# Patient Record
Sex: FEMALE | Race: WHITE | NOT HISPANIC OR LATINO | Employment: FULL TIME | URBAN - METROPOLITAN AREA
[De-identification: names, ages, dates, MRNs, and addresses within clinical notes are randomized per-mention and may not be internally consistent; named-entity substitution may affect disease eponyms.]

---

## 2017-06-05 ENCOUNTER — CONVERSION ENCOUNTER (OUTPATIENT)
Dept: MAMMOGRAPHY | Facility: HOSPITAL | Age: 52
End: 2017-06-05

## 2018-08-28 ENCOUNTER — CONVERSION ENCOUNTER (OUTPATIENT)
Dept: GENERAL RADIOLOGY | Facility: HOSPITAL | Age: 53
End: 2018-08-28

## 2019-11-27 ENCOUNTER — HOSPITAL ENCOUNTER (OUTPATIENT)
Dept: GENERAL RADIOLOGY | Facility: HOSPITAL | Age: 54
Discharge: HOME | End: 2019-11-27
Attending: NURSE PRACTITIONER

## 2020-05-13 ENCOUNTER — OFFICE VISIT CONVERTED (OUTPATIENT)
Dept: ORTHOPEDIC SURGERY | Facility: CLINIC | Age: 55
End: 2020-05-13
Attending: ORTHOPAEDIC SURGERY

## 2020-06-24 ENCOUNTER — OFFICE VISIT CONVERTED (OUTPATIENT)
Dept: ORTHOPEDIC SURGERY | Facility: CLINIC | Age: 55
End: 2020-06-24
Attending: ORTHOPAEDIC SURGERY

## 2020-07-14 ENCOUNTER — HOSPITAL ENCOUNTER (OUTPATIENT)
Dept: GENERAL RADIOLOGY | Facility: HOSPITAL | Age: 55
Discharge: HOME | End: 2020-07-14
Attending: ORTHOPAEDIC SURGERY

## 2020-07-20 ENCOUNTER — OFFICE VISIT CONVERTED (OUTPATIENT)
Dept: ORTHOPEDIC SURGERY | Facility: CLINIC | Age: 55
End: 2020-07-20
Attending: ORTHOPAEDIC SURGERY

## 2020-08-03 ENCOUNTER — HOSPITAL ENCOUNTER (OUTPATIENT)
Dept: PREADMISSION TESTING | Facility: HOSPITAL | Age: 55
Discharge: HOME | End: 2020-08-03
Attending: ORTHOPAEDIC SURGERY

## 2020-08-04 LAB — SARS-COV-2 RNA SPEC QL NAA+PROBE: NOT DETECTED

## 2020-08-06 ENCOUNTER — HOSPITAL ENCOUNTER (OUTPATIENT)
Dept: PERIOP | Facility: HOSPITAL | Age: 55
Setting detail: HOSPITAL OUTPATIENT SURGERY
Discharge: HOME | End: 2020-08-06
Attending: ORTHOPAEDIC SURGERY

## 2020-08-19 ENCOUNTER — OFFICE VISIT CONVERTED (OUTPATIENT)
Dept: ORTHOPEDIC SURGERY | Facility: CLINIC | Age: 55
End: 2020-08-19
Attending: PHYSICIAN ASSISTANT

## 2020-08-19 ENCOUNTER — CONVERSION ENCOUNTER (OUTPATIENT)
Dept: ORTHOPEDIC SURGERY | Facility: CLINIC | Age: 55
End: 2020-08-19

## 2020-09-16 ENCOUNTER — OFFICE VISIT CONVERTED (OUTPATIENT)
Dept: ORTHOPEDIC SURGERY | Facility: CLINIC | Age: 55
End: 2020-09-16
Attending: PHYSICIAN ASSISTANT

## 2020-10-14 ENCOUNTER — OFFICE VISIT CONVERTED (OUTPATIENT)
Dept: ORTHOPEDIC SURGERY | Facility: CLINIC | Age: 55
End: 2020-10-14
Attending: PHYSICIAN ASSISTANT

## 2020-10-28 ENCOUNTER — CONVERSION ENCOUNTER (OUTPATIENT)
Dept: ORTHOPEDIC SURGERY | Facility: CLINIC | Age: 55
End: 2020-10-28

## 2020-10-28 ENCOUNTER — OFFICE VISIT CONVERTED (OUTPATIENT)
Dept: ORTHOPEDIC SURGERY | Facility: CLINIC | Age: 55
End: 2020-10-28
Attending: PHYSICIAN ASSISTANT

## 2020-11-18 ENCOUNTER — OFFICE VISIT CONVERTED (OUTPATIENT)
Dept: ORTHOPEDIC SURGERY | Facility: CLINIC | Age: 55
End: 2020-11-18
Attending: PHYSICIAN ASSISTANT

## 2020-12-07 ENCOUNTER — OFFICE VISIT CONVERTED (OUTPATIENT)
Dept: ORTHOPEDIC SURGERY | Facility: CLINIC | Age: 55
End: 2020-12-07
Attending: PHYSICIAN ASSISTANT

## 2020-12-07 ENCOUNTER — CONVERSION ENCOUNTER (OUTPATIENT)
Dept: ORTHOPEDIC SURGERY | Facility: CLINIC | Age: 55
End: 2020-12-07

## 2020-12-17 ENCOUNTER — HOSPITAL ENCOUNTER (OUTPATIENT)
Dept: URGENT CARE | Facility: CLINIC | Age: 55
Discharge: HOME | End: 2020-12-17

## 2020-12-24 ENCOUNTER — HOSPITAL ENCOUNTER (OUTPATIENT)
Dept: GENERAL RADIOLOGY | Facility: HOSPITAL | Age: 55
Discharge: HOME | End: 2020-12-24
Attending: NURSE PRACTITIONER

## 2021-02-17 ENCOUNTER — OFFICE VISIT CONVERTED (OUTPATIENT)
Dept: ORTHOPEDIC SURGERY | Facility: CLINIC | Age: 56
End: 2021-02-17
Attending: PHYSICIAN ASSISTANT

## 2021-02-23 ENCOUNTER — CONVERSION ENCOUNTER (OUTPATIENT)
Dept: FAMILY MEDICINE CLINIC | Facility: CLINIC | Age: 56
End: 2021-02-23

## 2021-02-23 ENCOUNTER — OFFICE VISIT CONVERTED (OUTPATIENT)
Dept: FAMILY MEDICINE CLINIC | Facility: CLINIC | Age: 56
End: 2021-02-23
Attending: NURSE PRACTITIONER

## 2021-02-23 ENCOUNTER — HOSPITAL ENCOUNTER (OUTPATIENT)
Dept: FAMILY MEDICINE CLINIC | Facility: CLINIC | Age: 56
Discharge: HOME | End: 2021-02-23
Attending: NURSE PRACTITIONER

## 2021-02-24 LAB
ALBUMIN SERPL-MCNC: 4.1 G/DL (ref 3.5–5)
ALBUMIN/GLOB SERPL: 1.6 {RATIO} (ref 1.4–2.6)
ALP SERPL-CCNC: 76 U/L (ref 53–141)
ALT SERPL-CCNC: 16 U/L (ref 10–40)
ANION GAP SERPL CALC-SCNC: 17 MMOL/L (ref 8–19)
AST SERPL-CCNC: 21 U/L (ref 15–50)
BASOPHILS # BLD AUTO: 0.06 10*3/UL (ref 0–0.2)
BASOPHILS NFR BLD AUTO: 0.8 % (ref 0–3)
BILIRUB SERPL-MCNC: 0.23 MG/DL (ref 0.2–1.3)
BUN SERPL-MCNC: 13 MG/DL (ref 5–25)
BUN/CREAT SERPL: 19 {RATIO} (ref 6–20)
CALCIUM SERPL-MCNC: 9 MG/DL (ref 8.7–10.4)
CHLORIDE SERPL-SCNC: 107 MMOL/L (ref 99–111)
CHOLEST SERPL-MCNC: 187 MG/DL (ref 107–200)
CHOLEST/HDLC SERPL: 4.7 {RATIO} (ref 3–6)
CONV ABS IMM GRAN: 0.03 10*3/UL (ref 0–0.2)
CONV CO2: 20 MMOL/L (ref 22–32)
CONV IMMATURE GRAN: 0.4 % (ref 0–1.8)
CONV TOTAL PROTEIN: 6.7 G/DL (ref 6.3–8.2)
CREAT UR-MCNC: 0.7 MG/DL (ref 0.5–0.9)
DEPRECATED RDW RBC AUTO: 46.1 FL (ref 36.4–46.3)
EOSINOPHIL # BLD AUTO: 0.55 10*3/UL (ref 0–0.7)
EOSINOPHIL # BLD AUTO: 6.9 % (ref 0–7)
ERYTHROCYTE [DISTWIDTH] IN BLOOD BY AUTOMATED COUNT: 17.7 % (ref 11.7–14.4)
GFR SERPLBLD BASED ON 1.73 SQ M-ARVRAT: >60 ML/MIN/{1.73_M2}
GLOBULIN UR ELPH-MCNC: 2.6 G/DL (ref 2–3.5)
GLUCOSE SERPL-MCNC: 81 MG/DL (ref 65–99)
HCT VFR BLD AUTO: 32.7 % (ref 37–47)
HDLC SERPL-MCNC: 40 MG/DL (ref 40–60)
HGB BLD-MCNC: 9.5 G/DL (ref 12–16)
LDLC SERPL CALC-MCNC: 125 MG/DL (ref 70–100)
LYMPHOCYTES # BLD AUTO: 2.09 10*3/UL (ref 1–5)
LYMPHOCYTES NFR BLD AUTO: 26.3 % (ref 20–45)
MCH RBC QN AUTO: 21.1 PG (ref 27–31)
MCHC RBC AUTO-ENTMCNC: 29.1 G/DL (ref 33–37)
MCV RBC AUTO: 72.5 FL (ref 81–99)
MONOCYTES # BLD AUTO: 0.65 10*3/UL (ref 0.2–1.2)
MONOCYTES NFR BLD AUTO: 8.2 % (ref 3–10)
NEUTROPHILS # BLD AUTO: 4.58 10*3/UL (ref 2–8)
NEUTROPHILS NFR BLD AUTO: 57.4 % (ref 30–85)
NRBC CBCN: 0 % (ref 0–0.7)
OSMOLALITY SERPL CALC.SUM OF ELEC: 289 MOSM/KG (ref 273–304)
PLATELET # BLD AUTO: 385 10*3/UL (ref 130–400)
PMV BLD AUTO: 11.5 FL (ref 9.4–12.3)
POTASSIUM SERPL-SCNC: 4.2 MMOL/L (ref 3.5–5.3)
RBC # BLD AUTO: 4.51 10*6/UL (ref 4.2–5.4)
SODIUM SERPL-SCNC: 140 MMOL/L (ref 135–147)
T4 FREE SERPL-MCNC: 1.9 NG/DL (ref 0.9–1.8)
TRIGL SERPL-MCNC: 111 MG/DL (ref 40–150)
TSH SERPL-ACNC: 0.61 M[IU]/L (ref 0.27–4.2)
VLDLC SERPL-MCNC: 22 MG/DL (ref 5–37)
WBC # BLD AUTO: 7.96 10*3/UL (ref 4.8–10.8)

## 2021-02-26 ENCOUNTER — HOSPITAL ENCOUNTER (OUTPATIENT)
Dept: LAB | Facility: HOSPITAL | Age: 56
Discharge: HOME | End: 2021-02-26
Attending: NURSE PRACTITIONER

## 2021-02-26 LAB
FERRITIN SERPL-MCNC: 7 NG/ML (ref 10–200)
FOLATE SERPL-MCNC: 14.2 NG/ML (ref 4.8–20)
IRON SATN MFR SERPL: 6 % (ref 20–55)
IRON SERPL-MCNC: 25 UG/DL (ref 60–170)
TIBC SERPL-MCNC: 418 UG/DL (ref 245–450)
TRANSFERRIN SERPL-MCNC: 292 MG/DL (ref 250–380)
VIT B12 SERPL-MCNC: 376 PG/ML (ref 211–911)

## 2021-03-15 ENCOUNTER — OFFICE VISIT CONVERTED (OUTPATIENT)
Dept: ORTHOPEDIC SURGERY | Facility: CLINIC | Age: 56
End: 2021-03-15
Attending: ORTHOPAEDIC SURGERY

## 2021-04-13 ENCOUNTER — CONVERSION ENCOUNTER (OUTPATIENT)
Dept: SURGERY | Facility: CLINIC | Age: 56
End: 2021-04-13

## 2021-04-13 ENCOUNTER — OFFICE VISIT CONVERTED (OUTPATIENT)
Dept: SURGERY | Facility: CLINIC | Age: 56
End: 2021-04-13
Attending: SURGERY

## 2021-04-21 ENCOUNTER — HOSPITAL ENCOUNTER (OUTPATIENT)
Dept: LAB | Facility: HOSPITAL | Age: 56
Discharge: HOME | End: 2021-04-21
Attending: NURSE PRACTITIONER

## 2021-04-21 LAB
ALBUMIN SERPL-MCNC: 3.9 G/DL (ref 3.5–5)
ALBUMIN/GLOB SERPL: 1.3 {RATIO} (ref 1.4–2.6)
ALP SERPL-CCNC: 79 U/L (ref 53–141)
ALT SERPL-CCNC: 20 U/L (ref 10–40)
ANION GAP SERPL CALC-SCNC: 16 MMOL/L (ref 8–19)
AST SERPL-CCNC: 22 U/L (ref 15–50)
BASOPHILS # BLD AUTO: 0.05 10*3/UL (ref 0–0.2)
BASOPHILS NFR BLD AUTO: 0.7 % (ref 0–3)
BILIRUB SERPL-MCNC: 0.21 MG/DL (ref 0.2–1.3)
BUN SERPL-MCNC: 22 MG/DL (ref 5–25)
BUN/CREAT SERPL: 29 {RATIO} (ref 6–20)
CALCIUM SERPL-MCNC: 9.1 MG/DL (ref 8.7–10.4)
CHLORIDE SERPL-SCNC: 106 MMOL/L (ref 99–111)
CHOLEST SERPL-MCNC: 193 MG/DL (ref 107–200)
CHOLEST/HDLC SERPL: 4.7 {RATIO} (ref 3–6)
CONV ABS IMM GRAN: 0.03 10*3/UL (ref 0–0.2)
CONV CO2: 21 MMOL/L (ref 22–32)
CONV IMMATURE GRAN: 0.4 % (ref 0–1.8)
CONV TOTAL PROTEIN: 6.8 G/DL (ref 6.3–8.2)
CREAT UR-MCNC: 0.77 MG/DL (ref 0.5–0.9)
DEPRECATED RDW RBC AUTO: 63 FL (ref 36.4–46.3)
EOSINOPHIL # BLD AUTO: 0.5 10*3/UL (ref 0–0.7)
EOSINOPHIL # BLD AUTO: 6.9 % (ref 0–7)
ERYTHROCYTE [DISTWIDTH] IN BLOOD BY AUTOMATED COUNT: 21.9 % (ref 11.7–14.4)
GFR SERPLBLD BASED ON 1.73 SQ M-ARVRAT: >60 ML/MIN/{1.73_M2}
GLOBULIN UR ELPH-MCNC: 2.9 G/DL (ref 2–3.5)
GLUCOSE SERPL-MCNC: 96 MG/DL (ref 65–99)
HCT VFR BLD AUTO: 42.1 % (ref 37–47)
HDLC SERPL-MCNC: 41 MG/DL (ref 40–60)
HGB BLD-MCNC: 12.9 G/DL (ref 12–16)
IRON SATN MFR SERPL: 22 % (ref 20–55)
IRON SERPL-MCNC: 77 UG/DL (ref 60–170)
LDLC SERPL CALC-MCNC: 124 MG/DL (ref 70–100)
LYMPHOCYTES # BLD AUTO: 1.57 10*3/UL (ref 1–5)
LYMPHOCYTES NFR BLD AUTO: 21.6 % (ref 20–45)
MCH RBC QN AUTO: 25.4 PG (ref 27–31)
MCHC RBC AUTO-ENTMCNC: 30.6 G/DL (ref 33–37)
MCV RBC AUTO: 82.9 FL (ref 81–99)
MONOCYTES # BLD AUTO: 0.65 10*3/UL (ref 0.2–1.2)
MONOCYTES NFR BLD AUTO: 8.9 % (ref 3–10)
NEUTROPHILS # BLD AUTO: 4.48 10*3/UL (ref 2–8)
NEUTROPHILS NFR BLD AUTO: 61.5 % (ref 30–85)
NRBC CBCN: 0 % (ref 0–0.7)
OSMOLALITY SERPL CALC.SUM OF ELEC: 291 MOSM/KG (ref 273–304)
PLATELET # BLD AUTO: 294 10*3/UL (ref 130–400)
PMV BLD AUTO: 11 FL (ref 9.4–12.3)
POTASSIUM SERPL-SCNC: 4.1 MMOL/L (ref 3.5–5.3)
RBC # BLD AUTO: 5.08 10*6/UL (ref 4.2–5.4)
SODIUM SERPL-SCNC: 139 MMOL/L (ref 135–147)
TIBC SERPL-MCNC: 345 UG/DL (ref 245–450)
TRANSFERRIN SERPL-MCNC: 241 MG/DL (ref 250–380)
TRIGL SERPL-MCNC: 138 MG/DL (ref 40–150)
TSH SERPL-ACNC: 0.58 M[IU]/L (ref 0.27–4.2)
VLDLC SERPL-MCNC: 28 MG/DL (ref 5–37)
WBC # BLD AUTO: 7.28 10*3/UL (ref 4.8–10.8)

## 2021-05-05 ENCOUNTER — HOSPITAL ENCOUNTER (OUTPATIENT)
Dept: PREADMISSION TESTING | Facility: HOSPITAL | Age: 56
Discharge: HOME | End: 2021-05-05
Attending: SURGERY

## 2021-05-05 LAB — SARS-COV-2 RNA SPEC QL NAA+PROBE: NOT DETECTED

## 2021-05-10 NOTE — H&P
History and Physical      Patient Name: Amber Poole   Patient ID: [id number]   Sex: Female   YOB: 1965    Primary Care Provider: Rudy Razo MD   Referring Provider: Rudy Razo MD    Visit Date: May 13, 2020    Provider: Carl Swartz MD   Location: [facility]   Location Address: [address] [place] [address]  [id number]   Location Phone: [phone]          Chief Complaint  · Left Knee Pain      History Of Present Illness  Amber Poole is a 54 year old /White female who presents today to [facility].      She's complaining of pain in her left knee that started about 1.5 weeks ago. She had sudden pain while jogging to her car. The pain is in and around the kneecap. She had swelling and pain. She was seen by her primary care doctor. MRI was declined. Her primary care doctor referred her to me. She's been in a brace.       Past Medical History  Allergic rhinitis, chronic; Arthritis; Hypothyroidism; Seasonal allergies; Thyroid disorder         Past Surgical History  Tonsillectomy         Medication List  Aspir-81 81 mg oral tablet,delayed release (DR/EC); Claritin 10 mg oral tablet; diclofenac sodium 75 mg oral tablet,delayed release (DR/EC); levothyroxine 125 mcg oral tablet; Singulair 10 mg oral tablet; Vitamin C 500 mg oral tablet         Allergy List  PENICILLINS         Family Medical History  Liver Neoplasm, Malignant; Lung Neoplasm, Malignant; Heart Disease; Cancer, Unspecified; Diabetes, unspecified type; Renal Calculus; Bladder calculus         Social History  Alcohol (Never); Alcohol Use (Never); Caffeine (Current some day); Farmer; lives with spouse; ; .; 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); Second hand smoke exposure (Never); Tobacco (Never); Working         Review of Systems  · Constitutional  o Denies  o : fever, chills, weight loss  · Cardiovascular  o Denies  o : chest pain, shortness of breath  · Gastrointestinal  o Denies  o : liver  "disease, heartburn, nausea, blood in stools  · Genitourinary  o Denies  o : painful urination, blood in urine  · Integument  o Denies  o : rash, itching  · Neurologic  o Denies  o : headache, weakness, loss of consciousness  · Musculoskeletal  o Denies  o : painful, swollen joints  · Psychiatric  o Denies  o : drug/alcohol addiction, anxiety, depression      Vitals  Date Time BP Position Site L\R Cuff Size HR RR TEMP (F) WT  HT  BMI kg/m2 BSA m2 O2 Sat        05/13/2020 08:51 AM      80 - R   190lbs 4oz 5'  7\" 29.8 2.02 97 %          Physical Examination  · Constitutional  o Appearance  o : well developed, well-nourished, no obvious deformities present  · Head and Face  o Head  o :   § Inspection  § : normocephalic  o Face  o :   § Inspection  § : no facial lesions  · Eyes  o Conjunctivae  o : conjunctivae normal  o Sclerae  o : sclerae white  · Ears, Nose, Mouth and Throat  o Ears  o :   § External Ears  § : appearance within normal limits  § Hearing  § : intact  o Nose  o :   § External Nose  § : appearance normal  · Neck  o Inspection/Palpation  o : normal appearance  o Range of Motion  o : full range of motion  · Respiratory  o Respiratory Effort  o : breathing unlabored  o Inspection of Chest  o : normal appearance  o Auscultation of Lungs  o : no audible wheezing or rales  · Cardiovascular  o Heart  o : regular rate  · Gastrointestinal  o Abdominal Examination  o : soft and non-tender  · Skin and Subcutaneous Tissue  o General Inspection  o : intact, no rashes  · Psychiatric  o General  o : Alert and oriented x3  o Judgement and Insight  o : judgment and insight intact  o Mood and Affect  o : mood normal, affect appropriate  · Left Knee  o Inspection  o : Appearance of her knee is normal compared to the other side. No atrophy, or skin discoloration. Full knee ROM. Positive patellofemoral crepitus. Mild tenderness along medial and lateral joint line. Negative Lachman. Negative Posterior Sag. Stable to " valgus/varus stress. Good strength of quadriceps, hamstrings, dorsiflexors, and plantar flexors. Sensation grossly intact.   · Injection Note/Aspiration Note  o Site  o : left knee   o Procedure  o : Procedure: After educating the patient, patient gave consent for procedure. After using Chloraprep, the joint space was injected. The patient tolerated the procedure well.   o Medication  o : 80 mg of DepoMedrol with 9cc of 1% Lidocaine  · In Office Procedures  o View  o : LAT/SUNRISE/STANDING   o Site  o : left, knee  o Indication  o : Left knee pain   o Study  o : X-rays ordered, taken in the office, and reviewed today.  o Xray  o : Severe patellofemoral osteoarthritis and mild to moderate osteoarthritis.   o Comparative Data  o : No comparative data found          Assessment  · Acute Flare-up of Severe Patellofemoral Osteoarthritis, Left     715.16/M17.12  · Left knee pain, unspecified chronicity     719.46/M25.562      Plan  · Orders  o Depo-Medrol injection 80mg () - - 05/13/2020   Lot [id number] Exp 05 2021 [employer] Administered by LUZ ELENA Swartz MD  o Knee Intra-articular Injection without US Guidance [facility] (15779) - - 05/13/2020   Lot [id number] Exp 07 01 2021 [employer] Administered by LUZ ELENA Swartz MD  o Knee (Left) [facility] Preferred View ([id number]) - 719.46/M25.562 - 05/13/2020  · Medications  o Medications have been Reconciled  o Transition of Care or Provider Policy  · Instructions  o Reviewed the patient's Past Medical, Social, and Family history as well as the ROS at today's visit, no changes.  o Call or return if worsening symptoms.  o Exercise handout given.  o This note was transcribed by Rebecca Cullen. simi  o We are going to try a steroid injection. She can wean herself from the brace, as I instructed. I gave her some exercises to do at home. If she's failing to improve, she will follow back up with me and maybe consider an MRI at that point. Follow up in 4 weeks.  o Electronically Identified Patient Education  Materials Provided Electronically            Electronically Signed by: Rebecca Cullen - , Other -Author on May 18, 2020 11:27:37 AM  Electronically Co-signed by: Carl Swartz MD -Reviewer on May 18, 2020 04:40:31 PM

## 2021-05-10 NOTE — H&P
"   History and Physical      Patient Name: Amber Poole   Patient ID: [id number]   Sex: Female   YOB: 1965    Primary Care Provider: Rudy Razo MD   Referring Provider: Rudy Razo MD    Visit Date: February 23, 2021    Provider: SHAILESH Colvin   Location: [facility]   [address] [address] [address]  [id number]   Location Phone: [phone]          Chief Complaint     Establish care / Med refills       History Of Present Illness  Amber Poole is a 55 year old /White female who presents for evaluation and treatment of:      Here to [facility] care:  She was dx in 1984 with thyroid.  She states she has thyroid goiter.  She has not had any u/s of the thyroid.  She did have u/s in 2018 and it was for the goiter/ I revewed on [facility] side.  She is hyperthyroid and now hypothyroid.  She is not having any issues with her current dose.  Allergies:  She is doing good with current med.  She is taking yearly.    She has been having the pain in the knee--it is causing her not to sleep.  She is not feel that the diclofenac.  In April she hurt the knee and they did xray and they placed her diclofenac and surg in Aug.  She did IBU but it didn't help.  She said that \"Mobic didn't help\".       Past Medical History  Disease Name Date Onset Notes   Allergic rhinitis, chronic --  --    Allergies --  --    Arthritis --  --    Blood Clotting Disorder --  --    Hypercoagulable state --  --    Hypothyroidism 1987 --    Seasonal allergies --  --    Thyroid disorder --  --    Torn meniscus 8/2020 left knee          Past Surgical History  Procedure Name Date Notes   MENISCUS TEAR 2020 Been 8/2020   Tonsillectomy --  --    Uterine ablation 1998 [facility]         Medication List  Name Date Started Instructions   Aspir-81 81 mg oral tablet,delayed release (DR/EC)  take 1 tablet (81 mg) by oral route once daily   Claritin 10 mg oral tablet  take 1 tablet (10 mg) " by oral route once daily   diclofenac sodium 75 mg oral tablet,delayed release (DR/EC) 2021 take 1 tablet (75 mg) by oral route 2 times per day for 90 days   ibuprofen 800 mg oral tablet 2020 take 1 tablet by oral route BID with food   levothyroxine 200 mcg oral capsule 2021 take 1 capsule (200 mcg) by oral route once daily on an empty stomach 30 minutes before breakfast   Singulair 10 mg oral tablet 2021 take 1 tablet (10 mg) by oral route once daily in the evening   Synthroid 25 mcg oral tablet 2021 take 1 tablet by oral route daily   Vitamin C 500 mg oral tablet  take 1 tablet by oral route daily   Voltaren 1 % topical gel 2021 apply 4 grams to the affected area(s) by topical route 4 times per day         Allergy List  Allergen Name Date Reaction Notes   PENICILLINS --  --  --        Allergies Reconciled  Family Medical History  Disease Name Relative/Age Notes   Liver Neoplasm, Malignant  --    Lung Neoplasm, Malignant  --    Heart Disease Father/   Father   Cancer, Unspecified Daughter/   Daughter   Diabetes, unspecified type Mother/   Mother  Grandmother (maternal)   Leukemia Daughter/  Son/   --    Renal Calculus Grandmother (maternal)/  Mother/   Mother; Grandmother (maternal)   Bladder calculus Grandmother (maternal)/   Grandmother (maternal)         Reproductive History  Menstrual   Pregnancy Summary   Total Pregnancies: 3 Full Term: 0 Premature: 0   Ab Induced: 0 Ab Spontaneous: 0 Ectopics: 0   Multiples: 0 Livin         Social History  Finding Status Start/Stop Quantity Notes   Alcohol Never --/-- --  drinks no   Alcohol Use Never --/-- --  does not drink   Caffeine Current some day --/-- --  drinks occasionally; tea; 1-2 times per day   Guillen --  --/-- --  --    lives with spouse --  --/-- --  --     --  --/-- --  --    . --  --/-- --  --    Recreational Drug Use Never --/-- --  no   Second hand smoke exposure Never --/-- --  no   Tobacco Never  "--/-- --  never smoker  never a smoker   Working --  --/-- --  --          Review of Systems  · Constitutional  o Denies  o : fever, fatigue, weight loss, weight gain  · HENT  o Denies  o : sinus pain, nasal congestion, postnasal drip, sore throat  · Cardiovascular  o Denies  o : lower extremity edema, claudication, chest pressure, palpitations  · Respiratory  o Denies  o : shortness of breath, wheezing, cough, hemoptysis, dyspnea on exertion  · Gastrointestinal  o Denies  o : nausea, vomiting, diarrhea, constipation, abdominal pain  · Psychiatric  o Denies  o : suicidal ideation, homicidal ideation      Vitals  Date Time BP Position Site L\R Cuff Size HR RR TEMP (F) WT  HT  BMI kg/m2 BSA m2 O2 Sat FR L/min FiO2 HC       02/23/2021 10:56 /84 Sitting    78 - R 12 96.6 193lbs 16oz 5'  7\" 30.38 2.04 99 %      02/23/2021 11:15 /64 Sitting                       Physical Examination  · Constitutional  o Appearance  o : well-nourished, well developed, alert, in no acute distress  · Neck  o Inspection/Palpation  o : normal appearance, no masses or tenderness, trachea midline  o Thyroid  o : gland size normal, nontender, no nodules or masses present on palpation, thyroid motion normal during swallowing  · Respiratory  o Respiratory Effort  o : breathing unlabored  o Auscultation of Lungs  o : normal breath sounds throughout  · Cardiovascular  o Heart  o :   § Auscultation of Heart  § : regular rate and rhythm, no murmurs, gallops or rubs  § Palpation of Heart  § : normal apical impulse, no cardiac thrill present  o Peripheral Vascular System  o :   § Carotid Arteries  § : normal pulses bilaterally, no bruits present  § Pedal Pulses  § : pulses 2 bilaterally  § Extremities  § : no cyanosis, clubbing or edema; less than 2 second refill noted  · Neurologic  o Mental Status Examination  o :   § Orientation  § : grossly oriented to person, place and time  o Cranial Nerves  o : cranial nerves intact and symmetric " throughout  · Psychiatric  o Mood and Affect  o : mood normal, affect appropriate, denies any SI/HI          Assessment  · Allergic rhinitis due to allergen     477.9/J30.9  · Hypothyroidism     244.9/E03.9  · Screening for depression     V79.0/Z13.89  · Screening for lipid disorders     V77.91/Z13.220  · Hyperthyroidism     242.90/E05.90  · Joint pain     719.40/M25.50  · Knee pain, left     719.46/M25.562      Plan  · Orders  o ACO-18: Negative screen for clinical depression using a standardized tool () - V79.0/Z13.89 - 02/23/2021  o Free T4 (12314) - - 02/23/2021  o ACO-20: Screening Mammography documented and reviewed [facility] () - - 02/23/2021 2019  o ACO-19: Colorectal cancer screening results documented and reviewed (3017F) - - 02/23/2021 2017  o ACO-39: Current medications updated and reviewed (, 1159F) - - 02/23/2021  o Physical, Primary Care Panel (CBC, CMP, Lipid, TSH) [facility] (18959, 77942, 31975, 34956) - - 02/23/2021  · Medications  o etodolac 400 mg oral tablet   SIG: take 1 tablet (400 mg) by oral route 3 times per day with food   DISP: (90) Tablet with 5 refills  Adjusted on 02/23/2021     o levothyroxine 200 mcg oral capsule   SIG: take 1 capsule (200 mcg) by oral route once daily on an empty stomach 30 minutes before breakfast   DISP: (90) Capsule with 1 refills  Refilled on 02/23/2021     o Singulair 10 mg oral tablet   SIG: take 1 tablet (10 mg) by oral route once daily in the evening   DISP: (90) Tablet with 1 refills  Refilled on 02/23/2021     o Synthroid 25 mcg oral tablet   SIG: take 1 tablet by oral route daily   DISP: (90) Tablet with 1 refills  Refilled on 02/23/2021     o ibuprofen 800 mg oral tablet   SIG: take 1 tablet by oral route BID with food   DISP: (60) Tablet with 0 refills  Discontinued on 02/23/2021     · Instructions  o Depression Screen completed and scanned into the EMR under the designated folder within the patient's documents.  o Today's PHQ-9 result is  _5__  o Take all medications as prescribed/directed.  o Patient was educated/instructed on their diagnosis, treatment and medications prior to discharge from the clinic today.  o Patient instructed to seek medical attention urgently for new or worsening symptoms.  o Call the office with any concerns or questions.  o Time spent with the patient was 34minutes, more than 50% face to face.  o F.U in 6 months for labs. Call with any concerns or questions. We will trial etodolac for the knee.   · Disposition  o Call or Return if symptoms worsen or persist.            Electronically Signed by: SHAILESH Clovin -Author on February 23, 2021 11:40:38 AM

## 2021-05-11 NOTE — H&P
History and Physical      Patient Name: Amber Poole   Patient ID: [id number]   Sex: Female   YOB: 1965    Primary Care Provider: Rudy Razo MD   Referring Provider: Rudy Razo MD    Visit Date: 2021    Provider: John Esqueda MD   Location: [facility]   Location Address: [address] [place] [address]  [id number]   Location Phone: [phone]          Chief Complaint  · Outpatient History & Physical / Surgical Orders  · Colon Consult      History Of Present Illness     Ms. Poole is a 55-year-old female who presents with rectal bleeding. She notes bright red blood per rectum. She notes being anemic and has a low iron. She was placed on iron supplements. She had a colonoscopy several years ago, which revealed no polyps.       Past Medical History  Allergic rhinitis, chronic; Allergies; Arthritis; Blood Clotting Disorder; Hypercoagulable state; Hypothyroidism; Seasonal allergies; Thyroid disorder; Torn meniscus         Past Surgical History  MENISCUS TEAR; Tonsillectomy; Uterine ablation         Medication List  Aspir-81 81 mg oral tablet,delayed release (DR/EC); Claritin 10 mg oral tablet; etodolac 400 mg oral tablet; levothyroxine 200 mcg oral capsule; Singulair 10 mg oral tablet; Synthroid 25 mcg oral tablet; Vitamin C 500 mg oral tablet; Voltaren 1 % topical gel         Allergy List  PENICILLINS         Family Medical History  Liver Neoplasm, Malignant; Lung Neoplasm, Malignant; Heart Disease; Cancer, Unspecified; Diabetes, unspecified type; Leukemia; Renal Calculus; Bladder calculus         Reproductive History   3 Para 0 0 0 0       Social History  Alcohol Use; Caffeine (Current some day); Farmer; lives with spouse; ; .; 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); Second hand smoke exposure (Never); Tobacco (Never); Working         Immunizations  Name Date Admin   Influenza 10/01/2020         Review of Systems  · Cardiovascular  o Denies  o : chest  "pain on exertion, shortness of breath, lower extremity swelling  · Respiratory  o Denies  o : wheezing, chronic cough, coughing up blood  · Gastrointestinal  o Denies  o : diarrhea, chronic abdominal pain, reflux symptoms      Vitals  Date Time BP Position Site L\R Cuff Size HR RR TEMP (F) WT  HT  BMI kg/m2 BSA m2 O2 Sat FR L/min FiO2 HC       04/13/2021 10:35 AM         200lbs 6oz 5'  6\" 32.34 2.06             Physical Examination  · Constitutional  o Appearance  o : reveals patient to be in no acute distress  · Head and Face  o HEENT  o : shows sclera to be nonicteric  · Respiratory  o Respiratory  o : chest is clear  · Cardiovascular  o Heart  o : regular rate and rhythm without murmurs, gallops or rubs  · Gastrointestinal  o Abdominal Examination  o :   § Abdomen  § : abdomen is soft and nontender, bowel sounds are present, no masses, gaurding or rebound were noted   · Musculoskeletal  o Extremeties/Joint  o : extremities show a ful range of motion  · Neurologic  o Neurologic/Reflexes  o : intact          Assessment  · Rectal Bleeding     569.3/K62.5  · Pre-op testing     V72.84/Z01.818      Plan  · Orders  o Colonoscopy (28274) - 569.3/K62.5 - 05/20/2021  o Surgery Order (GENOR) - 569.3/K62.5 - 05/20/2021  o [facility] Pre-[place] Covid-19 Screening (73248) - V72.84/Z01.818 - 05/18/2021 5/18/21 @12pm  · Medications  o Medications have been Reconciled  o Transition of Care or Provider Policy  · Instructions  o PLAN:  o Handouts Provided-Pre-Procedure Instructions including date and time and location of procedure.  o Surgical Facility: [facility]  o ****Surgical Orders****  o RISK AND BENEFITS:  o Consent for surgery: Given these options, the patient has verbally expressed an understanding of the risks of surgery and finds these risks acceptable. We will proceed with surgery as soon as possible.  o Consult Anesthesia for any post operative block, or any pain management procedure deemed necessary by the " anesthesiologist for adequate post-operative pain control.  o O.R. PREP: Per protocol  o IV: LR@ 75ml/hr  o PLEASE SIGN PERMIT FOR: Rectal exam under anesthesia, Colonoscopy with possible biopsies, possible hemorrhoidectomy  o Kefzol 1 gram IV on call to OR.  o The above History and Physical Examination has been completed within 30 days of admission.  o ****Patient Status****  o Outpatient  o Pre-Admission Testing Date: Phone Screen 5/18/21 @2pm  o Electronically Identified Patient Education Materials Provided Electronically            Electronically Signed by: Kandy Reece, -Author on May 17, 2021 04:19:23 PM  Electronically Co-signed by: John Esqueda MD -Reviewer on May 20, 2021 07:40:04 AM

## 2021-05-13 NOTE — PROGRESS NOTES
Progress Note      Patient Name: Amber Poole   Patient ID: [id number]   Sex: Female   YOB: 1965    Primary Care Provider: Rudy Razo MD   Referring Provider: Rudy Razo MD    Visit Date: December 7, 2020    Provider: Xochitl Trinh PA-C   Location: [facility]   Location Address: [address] [place] [address]  [id number]   Location Phone: [phone]          Chief Complaint  · Left Knee Pain      History Of Present Illness  Amber Poole is a 55 year old /White female who presents today to [facility].      Patient is status post left knee arthroscopic partial medial meniscectomy, chondroplasty of medial femoral condyle, chondroplasty of patella performed 8/6/20 by Dr. Swartz. Patient states swelling in left knee is persistent. Patient states aspiration and steroid injection provided relief of pain and swelling for 1 week. Patient is taking Relafen 750mg. Patient states attending physical therapy at [facility]. Patient occupation is a .              Past Medical History  Allergic rhinitis, chronic; Arthritis; Hypothyroidism; Seasonal allergies; Thyroid disorder         Past Surgical History  Tonsillectomy         Medication List  Aspir-81 81 mg oral tablet,delayed release (DR/EC); Claritin 10 mg oral tablet; diclofenac sodium 75 mg oral tablet,delayed release (DR/EC); levothyroxine 125 mcg oral tablet; Medrol (Oren) 4 mg oral tablets,dose pack; meloxicam 15 mg oral tablet; nabumetone 750 mg oral tablet; Singulair 10 mg oral tablet; Vitamin C 500 mg oral tablet         Allergy List  PENICILLINS         Family Medical History  Liver Neoplasm, Malignant; Lung Neoplasm, Malignant; Heart Disease; Cancer, Unspecified; Diabetes, unspecified type; Renal Calculus; Bladder calculus         Social History  Alcohol (Never); Alcohol Use (Never); Caffeine (Current some day); Farmer; lives with spouse; ; .; 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); Second hand  "smoke exposure (Never); Tobacco (Never); Working         Review of Systems  · Constitutional  o Denies  o : fever, chills, weight loss  · Cardiovascular  o Denies  o : chest pain, shortness of breath  · Gastrointestinal  o Denies  o : liver disease, heartburn, nausea, blood in stools  · Genitourinary  o Denies  o : painful urination, blood in urine  · Integument  o Denies  o : rash, itching  · Neurologic  o Denies  o : headache, weakness, loss of consciousness  · Musculoskeletal  o Denies  o : painful, swollen joints  · Psychiatric  o Denies  o : drug/alcohol addiction, anxiety, depression      Vitals  Date Time BP Position Site L\R Cuff Size HR RR TEMP (F) WT  HT  BMI kg/m2 BSA m2 O2 Sat FR L/min FiO2 HC       12/07/2020 01:01 PM      81 - R   193lbs 0oz 5'  7\" 30.23 2.03 99 %            Physical Examination  · Constitutional  o Appearance  o : well developed, well-nourished, no obvious deformities present  · Head and Face  o Head  o :   § Inspection  § : normocephalic  o Face  o :   § Inspection  § : no facial lesions  · Eyes  o Conjunctivae  o : conjunctivae normal  o Sclerae  o : sclerae white  · Ears, Nose, Mouth and Throat  o Ears  o :   § External Ears  § : appearance within normal limits  § Hearing  § : intact  o Nose  o :   § External Nose  § : appearance normal  · Neck  o Inspection/Palpation  o : normal appearance  o Range of Motion  o : full range of motion  · Respiratory  o Respiratory Effort  o : breathing unlabored  o Inspection of Chest  o : normal appearance  o Auscultation of Lungs  o : no audible wheezing or rales  · Cardiovascular  o Heart  o : regular rate  · Gastrointestinal  o Abdominal Examination  o : soft and non-tender  · Skin and Subcutaneous Tissue  o General Inspection  o : intact, no rashes  · Psychiatric  o General  o : Alert and oriented x3  o Judgement and Insight  o : judgment and insight intact  o Mood and Affect  o : mood normal, affect appropriate  · Injection Note/Aspiration " Note  o Site  o : left knee   o Procedure  o : Procedure: After educating the patient, patient gave consent for procedure. After using Chloraprep, the joint space was injected. The patient tolerated the procedure well.   o Medication  o : 80 mg of DepoMedrol with 9cc of 1% Lidocaine  · In Office Procedures  o View  o : LAT/SUNRISE/STANDING   o Site  o : left, knee  o Indication  o : Left knee pain   o Study  o : X-rays ordered, taken in the office, and reviewed today.  o Xray  o : moderate osteoarthritis  · Left Knee-Street  o Inspection  o : scars present, no limping gait, weight bearing, swelling present, no ecchymosis, no atrophy, neutral alignment  o Palpation  o : tenderness at medial joint line, no lateral joint line tenderness, no patellar tendon tenderness, no pain of MCL, no pain at LCL  o ROM  o : full extension, full flexion  o Strength  o : full extension, full flexion  o Special Tests  o : negative varus stress, negaitve valgus stress  o Neurovascular  o : Full sensation, Dorsal Pedal Pulse 2+, posteriror tibialis pulse 2+          Assessment  · Primary osteoarthritis of left knee     715.16/M17.12  · Aftercare following surgery of the muskuloskeletal system     V54.81  · Left knee pain, unspecified chronicity     719.46/M25.562      Plan  · Orders  o Depo-Medrol injection 80mg () - 715.16/M17.12 - 12/07/2020   Lot [id number] Exp 10 2021 [employer] Administered by [employer] PA C  o Knee Intra-articular Injection without US Guidance [facility] ([id number]) - 715.16/M17.12 - 12/07/2020   Lot [id number] Exp 10 01 2021 [employer] Administered by [employer] PA C  o Knee (Left) 3 views X-Ray [facility] ([id number]) - 715.16/M17.12 - 12/07/2020  · Medications  o Medications have been Reconciled  o Transition of Care or Provider Policy  · Instructions  o Reviewed the patient's Past Medical, Social, and Family history as well as the ROS at today's visit, no changes.  o Call or return if worsening  symptoms.  o Follow Up in 2 weeks.  o Electronically Identified Patient Education Materials Provided Electronically     Aspirated 15cc of synovial fluid  return to work 1/4/21               Electronically Signed by: Xochitl Trinh PA-C -Author on December 7, 2020 02:20:57 PM

## 2021-05-13 NOTE — PROGRESS NOTES
Progress Note      Patient Name: Amber Poole   Patient ID: [id number]   Sex: Female   YOB: 1965    Primary Care Provider: Rudy Razo MD   Referring Provider: Rudy Razo MD    Visit Date: July 20, 2020    Provider: Carl Swartz MD   Location: [facility]   Location Address: [address] [place] [address]  [id number]   Location Phone: [phone]          Chief Complaint  · Follow up Left Knee Pain and Left Knee MRI Results.      History Of Present Illness  Amber Poole is a 54 year old /White female who is following up for the MRI of her left knee. She denies any other complaints besides pain in her left knee. All of her pain is on the inside portion of her knee and points to along the medial joint line.       Past Medical History  Allergic rhinitis, chronic; Arthritis; Hypothyroidism; Seasonal allergies; Thyroid disorder         Past Surgical History  Tonsillectomy         Medication List  Aspir-81 81 mg oral tablet,delayed release (DR/EC); Claritin 10 mg oral tablet; diclofenac sodium 75 mg oral tablet,delayed release (DR/EC); levothyroxine 125 mcg oral tablet; Singulair 10 mg oral tablet; Vitamin C 500 mg oral tablet         Allergy List  PENICILLINS         Family Medical History  Liver Neoplasm, Malignant; Lung Neoplasm, Malignant; Heart Disease; Cancer, Unspecified; Diabetes, unspecified type; Renal Calculus; Bladder calculus         Social History  Alcohol (Never); Alcohol Use (Never); Caffeine (Current some day); Farmer; lives with spouse; ; .; 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); Second hand smoke exposure (Never); Tobacco (Never); Working         Review of Systems  · Constitutional  o Denies  o : fever, chills, weight loss  · Cardiovascular  o Denies  o : chest pain, shortness of breath  · Gastrointestinal  o Denies  o : liver disease, heartburn, nausea, blood in stools  · Genitourinary  o Denies  o : painful urination, blood in  "urine  · Integument  o Denies  o : rash, itching  · Neurologic  o Denies  o : headache, weakness, loss of consciousness  · Musculoskeletal  o Admits  o : painful, swollen joints  · Psychiatric  o Denies  o : drug/alcohol addiction, anxiety, depression      Vitals  Date Time BP Position Site L\R Cuff Size HR RR TEMP (F) WT  HT  BMI kg/m2 BSA m2 O2 Sat        07/20/2020 08:17 AM         180lbs 0oz 5'  7\" 28.19 1.96           Physical Examination  · Constitutional  o Appearance  o : well developed, well-nourished, no obvious deformities present  · Head and Face  o Head  o :   § Inspection  § : normocephalic  o Face  o :   § Inspection  § : no facial lesions  · Eyes  o Conjunctivae  o : conjunctivae normal  o Sclerae  o : sclerae white  · Ears, Nose, Mouth and Throat  o Ears  o :   § External Ears  § : appearance within normal limits  § Hearing  § : intact  o Nose  o :   § External Nose  § : appearance normal  · Neck  o Inspection/Palpation  o : normal appearance  o Range of Motion  o : full range of motion  · Respiratory  o Respiratory Effort  o : breathing unlabored  o Inspection of Chest  o : normal appearance  o Auscultation of Lungs  o : no audible wheezing or rales  · Cardiovascular  o Heart  o : regular rate  · Gastrointestinal  o Abdominal Examination  o : soft and non-tender  · Skin and Subcutaneous Tissue  o General Inspection  o : intact, no rashes  · Psychiatric  o General  o : Alert and oriented x3  o Judgement and Insight  o : judgment and insight intact  o Mood and Affect  o : mood normal, affect appropriate  · Left Knee  o Inspection  o : Appearance of her knee is normal compared to the other side. No atrophy, or skin discoloration. Full knee range of motion. Tenderness to medial joint line. Positive pain with Kane's and Apley's. Stable to varus/valgus stress. Good strength of quadriceps, hamstrings, dorsiflexors, and plantar flexors. Neurovascularly intact. Sensation grossly intact. Calf supple; " no signs of DVT.   · Imaging  o Imaging  o : MRI performed at [facility] on 07/14/2020 revealed: 1) 0.5 cm lateral subluxation of the patella. No fracture or focal osseous lesion seen; 2) Vertical tear of the medial meniscal posterior horn. Degenerative changes in the medial meniscal body, which is partially extruded. No tear of the lateral meniscus seen. Cruciate ligaments, medial collateral ligament, lateral collateral ligament complex, patellar retinacula, and extensor mechanism appear unremarkable; 3) Moderate joint effusion is present; 4) Complete cartilage loss along the patellar lateral facet. Mild fibrillation of the cartilage in the medial compartment. Cartilage signal changes consistent with chondromalacia. Osteoarthritic spurring noted. No loose body seen.           Assessment  · Tear of medial meniscus of left knee, current, unspecified tear type, initial encounter     836.0/S83.242A  · Left knee pain, unspecified chronicity     719.46/M25.562      Plan  · Medications  o Medications have been Reconciled  o Transition of Care or Provider Policy  · Instructions  o Reviewed the patient's Past Medical, Social, and Family history as well as the ROS at today's visit, no changes.  o Call or return if worsening symptoms.  o Discussed surgery.  o Risks and benefits discussed with the patient and include, but are not limited to, infection, bleeding, death, blood clots, lung problems, heart attacks, possible damage to neurovascular structures, aesthetic deformity, and possible need for future surgeries, among others.   o Surgery pamphlet given.  o We discussed the risks and benefits of arthroscopy, and she understands. She would like to proceed, but she is not sure if she wants to be COVID tested, so she may call back and schedule. She understands she cannot get another injection for 3-4 months.             Electronically Signed by: Brandi Gutierrez-, Other -Author on  July 21, 2020 08:13:01 PM  Electronically Co-signed by: Carl Swartz MD -Reviewer on July 22, 2020 05:10:37 PM

## 2021-05-13 NOTE — PROGRESS NOTES
Progress Note      Patient Name: Amber Poole   Patient ID: [id number]   Sex: Female   YOB: 1965    Primary Care Provider: Rudy Razo MD   Referring Provider: Rudy Razo MD    Visit Date: November 18, 2020    Provider: Xochitl Trinh PA-C   Location: [facility]   Location Address: [address] [place] [address]  [id number]   Location Phone: [phone]          Chief Complaint  · Left knee pain      History Of Present Illness  Amber Poole is a 55 year old /White female who presents today to [facility].      Patient is status post left knee arthroscopic partial medial meniscectomy, chondroplasty of medial femoral condyle, chondroplasty of patella performed 8/6/20 by Dr. Swartz. Patient states swelling in left knee is persistent. Patient states steroid injection provided minimal relief of pain. Patient is taking Relafen 750mg. Patient states attending physical therapy at [facility]. Patient occupation is a .              Past Medical History  Allergic rhinitis, chronic; Arthritis; Hypothyroidism; Seasonal allergies; Thyroid disorder         Past Surgical History  Tonsillectomy         Medication List  Aspir-81 81 mg oral tablet,delayed release (DR/EC); Claritin 10 mg oral tablet; diclofenac sodium 75 mg oral tablet,delayed release (DR/EC); levothyroxine 125 mcg oral tablet; Medrol (Oren) 4 mg oral tablets,dose pack; meloxicam 15 mg oral tablet; nabumetone 750 mg oral tablet; Singulair 10 mg oral tablet; Vitamin C 500 mg oral tablet         Allergy List  PENICILLINS         Family Medical History  Liver Neoplasm, Malignant; Lung Neoplasm, Malignant; Heart Disease; Cancer, Unspecified; Diabetes, unspecified type; Renal Calculus; Bladder calculus         Social History  Alcohol (Never); Alcohol Use (Never); Caffeine (Current some day); Farmer; lives with spouse; ; .; 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); Second hand smoke exposure (Never); Tobacco  (Never); Working         Review of Systems  · Constitutional  o Denies  o : fever, chills, weight loss  · Cardiovascular  o Denies  o : chest pain, shortness of breath  · Gastrointestinal  o Denies  o : liver disease, heartburn, nausea, blood in stools  · Genitourinary  o Denies  o : painful urination, blood in urine  · Integument  o Denies  o : rash, itching  · Neurologic  o Denies  o : headache, weakness, loss of consciousness  · Musculoskeletal  o Denies  o : painful, swollen joints  · Psychiatric  o Denies  o : drug/alcohol addiction, anxiety, depression      Physical Examination  · Constitutional  o Appearance  o : well developed, well-nourished, no obvious deformities present  · Head and Face  o Head  o :   § Inspection  § : normocephalic  o Face  o :   § Inspection  § : no facial lesions  · Eyes  o Conjunctivae  o : conjunctivae normal  o Sclerae  o : sclerae white  · Ears, Nose, Mouth and Throat  o Ears  o :   § External Ears  § : appearance within normal limits  § Hearing  § : intact  o Nose  o :   § External Nose  § : appearance normal  · Neck  o Inspection/Palpation  o : normal appearance  o Range of Motion  o : full range of motion  · Respiratory  o Respiratory Effort  o : breathing unlabored  o Inspection of Chest  o : normal appearance  o Auscultation of Lungs  o : no audible wheezing or rales  · Cardiovascular  o Heart  o : regular rate  · Gastrointestinal  o Abdominal Examination  o : soft and non-tender  · Skin and Subcutaneous Tissue  o General Inspection  o : intact, no rashes  · Psychiatric  o General  o : Alert and oriented x3  o Judgement and Insight  o : judgment and insight intact  o Mood and Affect  o : mood normal, affect appropriate  · Injection Note/Aspiration Note  o Site  o : left knee   o Procedure  o : Procedure: After educating the patient, patient gave consent for procedure. After using Chloraprep, the joint space was injected. The patient tolerated the procedure well.    o Medication  o : 80 mg of DepoMedrol with 9cc of 1% Lidocaine  · Left Knee-Street  o Inspection  o : scars present, no limping gait, weight bearing, moderate swelling, no ecchymosis, no atrophy, neutral alignment  o Palpation  o : no medial joint line tenderness, no lateral joint line tenderness, no patellar tendon tenderness, no pain of MCL, no pain at LCL  o ROM  o : full extension, full flexion  o Strength  o : full extension, full flexion  o Special Tests  o : positive ballotable effusion, positive fluid wave, negative patellar compression, negative patellar apprehenison, negative Kane's test, negative Apley's test, negative anterior drawer, negative posterior drawer, negative lachman's drawer , negative varus stress, negaitve valgus stress  o Neurovascular  o : Full sensation, Dorsal Pedal Pulse 2+, posteriror tibialis pulse 2+          Assessment  · Aftercare following surgery of the muskuloskeletal system     V54.81  · Left knee pain, unspecified chronicity     719.46/M25.562  · Knee effusion     719.06/M25.469      Plan  · Orders  o Depo-Medrol injection 80mg () - - 11/18/2020   Lot [id number] exp 09 2021 [employer] Administered by [employer] PA C  o Knee Intra-articular Injection without US Guidance [facility] ([id number]) - - 11/18/2020   Lot [id number] exp 08 2021 [employer] Administered by [employer] PA C  · Medications  o Medications have been Reconciled  o Transition of Care or Provider Policy  · Instructions  o Reviewed the patient's Past Medical, Social, and Family history as well as the ROS at today's visit, no changes.  o Call or return if worsening symptoms.  o Follow Up in 2 weeks.  o Electronically Identified Patient Education Materials Provided Electronically     Aspirated 40cc of clear synovial fluid no signs of infection.  follow up 2 weeks             Electronically Signed by: JUANY Merida-C -Author on November 18, 2020 09:54:05 AM  Electronically Co-signed by: Carl Swartz MD -Reviewer on November  18, 2020 09:49:21 PM

## 2021-05-13 NOTE — PROGRESS NOTES
Progress Note      Patient Name: Amber Poole   Patient ID: [id number]   Sex: Female   YOB: 1965    Primary Care Provider: Rudy Razo MD   Referring Provider: Rudy Razo MD    Visit Date: October 28, 2020    Provider: Xochitl Trinh PA-C   Location: [facility]   Location Address: [address] [place] [address]  [id number]   Location Phone: [phone]          Chief Complaint  · Left Knee Pain      History Of Present Illness  Amber Poole is a 55 year old /White female who presents today to [facility].      Patient is status post left knee arthroscopic partial medial meniscectomy, chondroplasty of medial femoral condyle, chondroplasty of patella performed 8/6/20 by Dr. Swartz. Patient states swelling in left knee is persistent. Patient states steroid injection provided minimal relief of pain. Patient is taking Relafen 750mg. Patient states attending physical therapy at [facility]. Patient occupation is a .            Past Medical History  Allergic rhinitis, chronic; Arthritis; Hypothyroidism; Seasonal allergies; Thyroid disorder         Past Surgical History  Tonsillectomy         Medication List  Aspir-81 81 mg oral tablet,delayed release (DR/EC); Claritin 10 mg oral tablet; diclofenac sodium 75 mg oral tablet,delayed release (DR/EC); levothyroxine 125 mcg oral tablet; Medrol (Oren) 4 mg oral tablets,dose pack; meloxicam 15 mg oral tablet; nabumetone 750 mg oral tablet; Singulair 10 mg oral tablet; Vitamin C 500 mg oral tablet         Allergy List  PENICILLINS         Family Medical History  Liver Neoplasm, Malignant; Lung Neoplasm, Malignant; Heart Disease; Cancer, Unspecified; Diabetes, unspecified type; Renal Calculus; Bladder calculus         Social History  Alcohol (Never); Alcohol Use (Never); Caffeine (Current some day); Farmer; lives with spouse; ; .; 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); Second hand smoke exposure (Never); Tobacco  "(Never); Working         Review of Systems  · Constitutional  o Denies  o : fever, chills, weight loss  · Cardiovascular  o Denies  o : chest pain, shortness of breath  · Gastrointestinal  o Denies  o : liver disease, heartburn, nausea, blood in stools  · Genitourinary  o Denies  o : painful urination, blood in urine  · Integument  o Denies  o : rash, itching  · Neurologic  o Denies  o : headache, weakness, loss of consciousness  · Musculoskeletal  o Denies  o : painful, swollen joints  · Psychiatric  o Denies  o : drug/alcohol addiction, anxiety, depression      Vitals  Date Time BP Position Site L\R Cuff Size HR RR TEMP (F) WT  HT  BMI kg/m2 BSA m2 O2 Sat FR L/min FiO2 HC       10/28/2020 10:00 AM      74 - R   193lbs 0oz 5'  7\" 30.23 2.03 98 %            Physical Examination  · Constitutional  o Appearance  o : well developed, well-nourished, no obvious deformities present  · Head and Face  o Head  o :   § Inspection  § : normocephalic  o Face  o :   § Inspection  § : no facial lesions  · Eyes  o Conjunctivae  o : conjunctivae normal  o Sclerae  o : sclerae white  · Ears, Nose, Mouth and Throat  o Ears  o :   § External Ears  § : appearance within normal limits  § Hearing  § : intact  o Nose  o :   § External Nose  § : appearance normal  · Neck  o Inspection/Palpation  o : normal appearance  o Range of Motion  o : full range of motion  · Respiratory  o Respiratory Effort  o : breathing unlabored  o Inspection of Chest  o : normal appearance  o Auscultation of Lungs  o : no audible wheezing or rales  · Cardiovascular  o Heart  o : regular rate  · Gastrointestinal  o Abdominal Examination  o : soft and non-tender  · Skin and Subcutaneous Tissue  o General Inspection  o : intact, no rashes  · Psychiatric  o General  o : Alert and oriented x3  o Judgement and Insight  o : judgment and insight intact  o Mood and Affect  o : mood normal, affect appropriate  · Left Knee-Street  o Inspection  o : scars well healed, " limping gait, weight bearing, swelling, no ecchymosis, no atrophy, neutral alignment  o Palpation  o : no medial joint line tenderness, no lateral joint line tenderness, no patellar tendon tenderness, no pain of MCL, no pain at LCL  o ROM  o : full extension, full flexion  o Strength  o : full extension, full flexion  o Neurovascular  o : Full sensation, Dorsal Pedal Pulse 2+, posteriror tibialis pulse 2+          Assessment  · Aftercare following surgery of the muskuloskeletal system     V54.81  · Left knee pain, unspecified chronicity     719.46/M25.562  · Knee effusion     719.06/M25.469      Plan  · Medications  o Medications have been Reconciled  o Transition of Care or Provider Policy  · Instructions  o Reviewed the patient's Past Medical, Social, and Family history as well as the ROS at today's visit, no changes.  o Call or return if worsening symptoms.  o Follow Up in 3 weeks.  o Electronically Identified Patient Education Materials Provided Electronically     Continue physical therapy at [facility]  Prescribed Relafen 750mg one po BID               Electronically Signed by: JUANY Merida-MILADIS -Author on October 28, 2020 10:47:37 AM  Electronically Co-signed by: Carl Swartz MD -Reviewer on October 28, 2020 10:27:24 PM

## 2021-05-13 NOTE — PROGRESS NOTES
Progress Note      Patient Name: Amber Poole   Patient ID: [id number]   Sex: Female   YOB: 1965    Primary Care Provider: Rudy Razo MD   Referring Provider: Rudy Razo MD    Visit Date: August 19, 2020    Provider: Xochitl Trinh PA-C   Location: [facility]   Location Address: [address] [place] [address]  [id number]   Location Phone: [phone]          Chief Complaint  · left knee pain      History Of Present Illness  Amber Poole is a 54 year old /White female who presents today to [facility].      Patient is status post left knee arthroscopic partial medial meniscectomy, chondroplasty of medial femoral condyle, chondroplasty of patella performed 8/6/20 by Dr. Swartz. Patient states mild pain on the medial side of the knee. Patient is attending physical therapy at [facility]. Patient denies calf pain. Patient is taking Voltaren 75mg.       Past Medical History  Allergic rhinitis, chronic; Arthritis; Hypothyroidism; Seasonal allergies; Thyroid disorder         Past Surgical History  Tonsillectomy         Medication List  Aspir-81 81 mg oral tablet,delayed release (DR/EC); Claritin 10 mg oral tablet; diclofenac sodium 75 mg oral tablet,delayed release (DR/EC); levothyroxine 125 mcg oral tablet; Singulair 10 mg oral tablet; Vitamin C 500 mg oral tablet         Allergy List  PENICILLINS         Family Medical History  Liver Neoplasm, Malignant; Lung Neoplasm, Malignant; Heart Disease; Cancer, Unspecified; Diabetes, unspecified type; Renal Calculus; Bladder calculus         Social History  Alcohol (Never); Alcohol Use (Never); Caffeine (Current some day); Farmer; lives with spouse; ; .; 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); Second hand smoke exposure (Never); Tobacco (Never); Working         Review of Systems  · Constitutional  o Denies  o : fever, chills, weight loss  · Cardiovascular  o Denies  o : chest pain, shortness of  "breath  · Gastrointestinal  o Denies  o : liver disease, heartburn, nausea, blood in stools  · Genitourinary  o Denies  o : painful urination, blood in urine  · Integument  o Denies  o : rash, itching  · Neurologic  o Denies  o : headache, weakness, loss of consciousness  · Musculoskeletal  o Denies  o : painful, swollen joints  · Psychiatric  o Denies  o : drug/alcohol addiction, anxiety, depression      Vitals  Date Time BP Position Site L\R Cuff Size HR RR TEMP (F) WT  HT  BMI kg/m2 BSA m2 O2 Sat        08/19/2020 09:02 AM      82 - R   193lbs 16oz 5'  7\" 30.38 2.04 98 %          Physical Examination  · Constitutional  o Appearance  o : well developed, well-nourished, no obvious deformities present  · Head and Face  o Head  o :   § Inspection  § : normocephalic  o Face  o :   § Inspection  § : no facial lesions  · Eyes  o Conjunctivae  o : conjunctivae normal  o Sclerae  o : sclerae white  · Ears, Nose, Mouth and Throat  o Ears  o :   § External Ears  § : appearance within normal limits  § Hearing  § : intact  o Nose  o :   § External Nose  § : appearance normal  · Neck  o Inspection/Palpation  o : normal appearance  o Range of Motion  o : full range of motion  · Respiratory  o Respiratory Effort  o : breathing unlabored  o Inspection of Chest  o : normal appearance  o Auscultation of Lungs  o : no audible wheezing or rales  · Cardiovascular  o Heart  o : regular rate  · Gastrointestinal  o Abdominal Examination  o : soft and non-tender  · Skin and Subcutaneous Tissue  o General Inspection  o : intact, no rashes  · Psychiatric  o General  o : Alert and oriented x3  o Judgement and Insight  o : judgment and insight intact  o Mood and Affect  o : mood normal, affect appropriate  · Left Knee-Street  o Inspection  o : incisions well healed without sign of infection, limping gait, weight bearing, swelling, no ecchymosis, no atrophy, neutral alignment  o Palpation  o : tenderness at medial joint line, no lateral " joint line tenderness, no patellar tendon tenderness, no pain of MCL, no pain at LCL  o ROM  o : -5 extension, 90 flexion  o Strength  o : full extension, full flexion  o Special Tests  o : negative Dereck's sign  o Neurovascular  o : Full sensation, Dorsal Pedal Pulse 2+, posteriror tibialis pulse 2+          Assessment  · Aftercare following surgery of the muskuloskeletal system     V54.81  · Left knee pain, unspecified chronicity     719.46/M25.562      Plan  · Medications  o Medications have been Reconciled  o Transition of Care or Provider Policy  · Instructions  o Sutures removed in clinic today.  o Reviewed the patient's Past Medical, Social, and Family history as well as the ROS at today's visit, no changes.  o Call or return if worsening symptoms.  o Follow Up in 4 weeks.   o Electronically Identified Patient Education Materials Provided Electronically     Educated Patient on using ice 20 minutes per time BID, ROM exercises.  Continue physical therapy at [facility]             Electronically Signed by: JUANY Merida-C -Author on August 19, 2020 09:27:59 AM  Electronically Co-signed by: Carl Swartz MD -Reviewer on August 20, 2020 10:36:56 PM

## 2021-05-13 NOTE — PROGRESS NOTES
Progress Note      Patient Name: Amber oPole   Patient ID: [id number]   Sex: Female   YOB: 1965    Primary Care Provider: Rudy Razo MD   Referring Provider: Rudy Razo MD    Visit Date: June 24, 2020    Provider: Carl Swartz MD   [facility]   Location Address: [address] [place] [address]  [id number]   Location Phone: [phone]          Chief Complaint  · Left Knee Pain      History Of Present Illness  Amber Poole is a 54 year old /White female who presents today to [facility].      Patient presents today follow-up of left knee pain. She had a steroid injection in May with some relief of pain. She reports the knee is better than what it was but she still has some pain over the anterior knee. She mentions the most pain over the anteromedial knee at night. Patient has been taking Diclofenac with some relief of pain.       Past Medical History  Allergic rhinitis, chronic; Arthritis; Hypothyroidism; Seasonal allergies; Thyroid disorder         Past Surgical History  Tonsillectomy         Medication List  Aspir-81 81 mg oral tablet,delayed release (DR/EC); Claritin 10 mg oral tablet; diclofenac sodium 75 mg oral tablet,delayed release (DR/EC); levothyroxine 125 mcg oral tablet; Singulair 10 mg oral tablet; Vitamin C 500 mg oral tablet         Allergy List  PENICILLINS       Allergies Reconciled  Family Medical History  Liver Neoplasm, Malignant; Lung Neoplasm, Malignant; Heart Disease; Cancer, Unspecified; Diabetes, unspecified type; Renal Calculus; Bladder calculus         Social History  Alcohol (Never); Alcohol Use (Never); Caffeine (Current some day); Farmer; lives with spouse; ; .; 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); Second hand smoke exposure (Never); Tobacco (Never); Working         Review of Systems  · Constitutional  o Denies  o : fever, chills, weight loss  · Cardiovascular  o Denies  o : chest pain, shortness of  "breath  · Gastrointestinal  o Denies  o : liver disease, heartburn, nausea, blood in stools  · Genitourinary  o Denies  o : painful urination, blood in urine  · Integument  o Denies  o : rash, itching  · Neurologic  o Denies  o : headache, weakness, loss of consciousness  · Musculoskeletal  o Denies  o : painful, swollen joints  · Psychiatric  o Denies  o : drug/alcohol addiction, anxiety, depression      Vitals  Date Time BP Position Site L\R Cuff Size HR RR TEMP (F) WT  HT  BMI kg/m2 BSA m2 O2 Sat        06/24/2020 08:21 AM      75 - R   188lbs 0oz 5'  7\" 29.44 2.01 98 %          Physical Examination  · Constitutional  o Appearance  o : well developed, well-nourished, no obvious deformities present  · Head and Face  o Head  o :   § Inspection  § : normocephalic  o Face  o :   § Inspection  § : no facial lesions  · Eyes  o Conjunctivae  o : conjunctivae normal  o Sclerae  o : sclerae white  · Ears, Nose, Mouth and Throat  o Ears  o :   § External Ears  § : appearance within normal limits  § Hearing  § : intact  o Nose  o :   § External Nose  § : appearance normal  · Neck  o Inspection/Palpation  o : normal appearance  o Range of Motion  o : full range of motion  · Respiratory  o Respiratory Effort  o : breathing unlabored  o Inspection of Chest  o : normal appearance  o Auscultation of Lungs  o : no audible wheezing or rales  · Cardiovascular  o Heart  o : regular rate  · Gastrointestinal  o Abdominal Examination  o : soft and non-tender  · Skin and Subcutaneous Tissue  o General Inspection  o : intact, no rashes  · Psychiatric  o General  o : Alert and oriented x3  o Judgement and Insight  o : judgment and insight intact  o Mood and Affect  o : mood normal, affect appropriate  · Left Knee  o Inspection  o : Appearance of her knee is normal compared to the other side. No atrophy, or skin discoloration. Full knee ROM. Positive patellofemoral crepitus. Mild tenderness along medial and lateral joint line. Negative " Lachman. Negative Posterior Sag. Stable to valgus/varus stress. Good strength of quadriceps, hamstrings, dorsiflexors, and plantar flexors. Sensation grossly intact.   · Imaging  o Imaging  o : XR [facility] May 2020:Severe patellofemoral osteoarthritis and mild to moderate osteoarthritis.           Assessment  · Primary osteoarthritis of left knee, patellofemoral     715.16/M17.12  · MMT (medial meniscus tear)     836.0/S83.249A  · Left knee pain, unspecified chronicity     719.46/M25.562      Plan  · Medications  o Medications have been Reconciled  o Transition of Care or Provider Policy  · Instructions  o Reviewed the patient's Past Medical, Social, and Family history as well as the ROS at today's visit, no changes.  o Call or return if worsening symptoms.  o The above service was scribed by Ariella Galarza on my behalf and I attest to the accuracy of the note. simi  o We recommended an MRI of the left knee to rule out meniscal or ligamentous damage. She expressed understanding and wished to proceed with MRI. She will follow-up after the MRI.             Electronically Signed by: Ariella Galarza-, Other -Author on June 24, 2020 11:32:25 AM  Electronically Co-signed by: Carl Swartz MD -Reviewer on June 26, 2020 04:39:15 PM

## 2021-05-13 NOTE — PROGRESS NOTES
Progress Note      Patient Name: Amber Pooel   Patient ID: [id number]   Sex: Female   YOB: 1965    Primary Care Provider: Rudy Razo MD   Referring Provider: Rudy Razo MD    Visit Date: October 14, 2020    Provider: Xochitl rTinh PA-C   Location: [facility]   Location Address: [address] [place] [address]  [id number]   Location Phone: [phone]          Chief Complaint  · Left knee pain       History Of Present Illness  Amber Poole is a 54 year old /White female who presents today to [facility].      Patient is status post left knee arthroscopic partial medial meniscectomy, chondroplasty of medial femoral condyle, chondroplasty of patella performed 8/6/20 by Dr. Swartz. Patient states swelling in left knee is persistent. Patient states steroid injection provided minimal relief of pain. Patient states trying Voltaren 75mg and Meloxicam 15mg with minimal relief of swelling. Patient states attending physical therapy at [facility] with minimal relief of swelling. Patient occupation is a .       Past Medical History  Allergic rhinitis, chronic; Arthritis; Hypothyroidism; Seasonal allergies; Thyroid disorder         Past Surgical History  Tonsillectomy         Medication List  Aspir-81 81 mg oral tablet,delayed release (DR/EC); Claritin 10 mg oral tablet; diclofenac sodium 75 mg oral tablet,delayed release (DR/EC); levothyroxine 125 mcg oral tablet; meloxicam 15 mg oral tablet; Singulair 10 mg oral tablet; Vitamin C 500 mg oral tablet         Allergy List  PENICILLINS         Family Medical History  Liver Neoplasm, Malignant; Lung Neoplasm, Malignant; Heart Disease; Cancer, Unspecified; Diabetes, unspecified type; Renal Calculus; Bladder calculus         Social History  Alcohol (Never); Alcohol Use (Never); Caffeine (Current some day); Farmer; lives with spouse; ; .; 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); Second hand smoke exposure  "(Never); Tobacco (Never); Working         Review of Systems  · Constitutional  o Denies  o : fever, chills, weight loss  · Cardiovascular  o Denies  o : chest pain, shortness of breath  · Gastrointestinal  o Denies  o : liver disease, heartburn, nausea, blood in stools  · Genitourinary  o Denies  o : painful urination, blood in urine  · Integument  o Denies  o : rash, itching  · Neurologic  o Denies  o : headache, weakness, loss of consciousness  · Musculoskeletal  o Denies  o : painful, swollen joints  · Psychiatric  o Denies  o : drug/alcohol addiction, anxiety, depression      Vitals  Date Time BP Position Site L\R Cuff Size HR RR TEMP (F) WT  HT  BMI kg/m2 BSA m2 O2 Sat FR L/min FiO2 HC       10/14/2020 09:09 AM      85 - R   193lbs 2oz 5'  7\" 30.25 2.03 97 %            Physical Examination  · Constitutional  o Appearance  o : well developed, well-nourished, no obvious deformities present  · Head and Face  o Head  o :   § Inspection  § : normocephalic  o Face  o :   § Inspection  § : no facial lesions  · Eyes  o Conjunctivae  o : conjunctivae normal  o Sclerae  o : sclerae white  · Ears, Nose, Mouth and Throat  o Ears  o :   § External Ears  § : appearance within normal limits  § Hearing  § : intact  o Nose  o :   § External Nose  § : appearance normal  · Neck  o Inspection/Palpation  o : normal appearance  o Range of Motion  o : full range of motion  · Respiratory  o Respiratory Effort  o : breathing unlabored  o Inspection of Chest  o : normal appearance  o Auscultation of Lungs  o : no audible wheezing or rales  · Cardiovascular  o Heart  o : regular rate  · Gastrointestinal  o Abdominal Examination  o : soft and non-tender  · Skin and Subcutaneous Tissue  o General Inspection  o : intact, no rashes  · Psychiatric  o General  o : Alert and oriented x3  o Judgement and Insight  o : judgment and insight intact  o Mood and Affect  o : mood normal, affect appropriate  · Left Knee-Street  o Inspection  o : scars " present, no limping gait, weight bearing, swelling, no ecchymosis, no atrophy, neutral alignment  o Palpation  o : tenderness at medial joint line, no lateral joint line tenderness, no patellar tendon tenderness, no pain of MCL, no pain at LCL  o ROM  o : full extension, 110 flexion  o Strength  o : full extension, full flexion  o Neurovascular  o : Full sensation, Dorsal Pedal Pulse 2+, posteriror tibialis pulse 2+          Assessment  · Aftercare following surgery of the muskuloskeletal system     V54.81  · Left knee pain, unspecified chronicity     719.46/M25.562  · Knee swelling     719.06/M25.469      Plan  · Medications  o Medications have been Reconciled  o Transition of Care or Provider Policy  · Instructions  o Reviewed the patient's Past Medical, Social, and Family history as well as the ROS at today's visit, no changes.  o Call or return if worsening symptoms.  o Follow Up in 2 weeks.  o Electronically Identified Patient Education Materials Provided Electronically     Prescribed Medrol Dose Oren  Prescribed physical therapy focus on ROM and swelling  Follow up 2 weeks for evaluation of returning to work             Electronically Signed by: JUANY Merida-MILADIS -Author on October 14, 2020 09:50:48 AM  Electronically Co-signed by: Carl Swartz MD -Reviewer on October 14, 2020 12:30:59 PM

## 2021-05-13 NOTE — PROGRESS NOTES
Progress Note      Patient Name: mAber Poole   Patient ID: [id number]   Sex: Female   YOB: 1965    Primary Care Provider: Rudy Razo MD   Referring Provider: Rudy Razo MD    Visit Date: September 16, 2020    Provider: Xochitl Trinh PA-C   Location: [facility]   Location Address: [address] [place] [address]  [id number]   Location Phone: [phone]          Chief Complaint  · Left knee pain       History Of Present Illness  Amber Poole is a 54 year old /White female who presents today to [facility].      Patient is status post left knee arthroscopic partial medial meniscectomy, chondroplasty of medial femoral condyle, chondroplasty of patella performed 8/6/20 by Dr. Swartz. Patient states mild pain on the medial side of the knee. Patient states moderate amount of swelling. Patient is attending physical therapy at \[facility]". Patient states KT taping does reduce swelling. Patient denies calf pain. Patient is taking Voltaren 75mg.            Past Medical History  Allergic rhinitis, chronic; Arthritis; Hypothyroidism; Seasonal allergies; Thyroid disorder         Past Surgical History  Tonsillectomy         Medication List  Aspir-81 81 mg oral tablet,delayed release (DR/EC); Claritin 10 mg oral tablet; diclofenac sodium 75 mg oral tablet,delayed release (DR/EC); levothyroxine 125 mcg oral tablet; Singulair 10 mg oral tablet; Vitamin C 500 mg oral tablet         Allergy List  PENICILLINS         Family Medical History  Liver Neoplasm, Malignant; Lung Neoplasm, Malignant; Heart Disease; Cancer, Unspecified; Diabetes, unspecified type; Renal Calculus; Bladder calculus         Social History  Alcohol (Never); Alcohol Use (Never); Caffeine (Current some day); Farmer; lives with spouse; ; .; 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); Second hand smoke exposure (Never); Tobacco (Never); Working         Review of Systems  · Constitutional  o Denies  o : fever,  "chills, weight loss  · Cardiovascular  o Denies  o : chest pain, shortness of breath  · Gastrointestinal  o Denies  o : liver disease, heartburn, nausea, blood in stools  · Genitourinary  o Denies  o : painful urination, blood in urine  · Integument  o Denies  o : rash, itching  · Neurologic  o Denies  o : headache, weakness, loss of consciousness  · Musculoskeletal  o Denies  o : painful, swollen joints  · Psychiatric  o Denies  o : drug/alcohol addiction, anxiety, depression      Vitals  Date Time BP Position Site L\R Cuff Size HR RR TEMP (F) WT  HT  BMI kg/m2 BSA m2 O2 Sat        09/16/2020 09:37 AM      84 - R   191lbs 4oz 5'  7\" 29.95 2.03 98 %          Physical Examination  · Constitutional  o Appearance  o : well developed, well-nourished, no obvious deformities present  · Head and Face  o Head  o :   § Inspection  § : normocephalic  o Face  o :   § Inspection  § : no facial lesions  · Eyes  o Conjunctivae  o : conjunctivae normal  o Sclerae  o : sclerae white  · Ears, Nose, Mouth and Throat  o Ears  o :   § External Ears  § : appearance within normal limits  § Hearing  § : intact  o Nose  o :   § External Nose  § : appearance normal  · Neck  o Inspection/Palpation  o : normal appearance  o Range of Motion  o : full range of motion  · Respiratory  o Respiratory Effort  o : breathing unlabored  o Inspection of Chest  o : normal appearance  o Auscultation of Lungs  o : no audible wheezing or rales  · Cardiovascular  o Heart  o : regular rate  · Gastrointestinal  o Abdominal Examination  o : soft and non-tender  · Skin and Subcutaneous Tissue  o General Inspection  o : intact, no rashes  · Psychiatric  o General  o : Alert and oriented x3  o Judgement and Insight  o : judgment and insight intact  o Mood and Affect  o : mood normal, affect appropriate  · Injection Note/Aspiration Note  o Site  o : left knee  o Procedure  o : Procedure: After educating the patient, patient gave consent for procedure. After using " Chloraprep, the joint space was injected. The patient tolerated the procedure well.  o Medication  o : 80 mg of DepoMedrol with 9cc of 1% Lidocaine  · Left Knee-Street  o Inspection  o : scars present, limping gait, weight bearing, swelling, no ecchymosis, no atrophy, neutral alignment  o Palpation  o : tenderness at medial joint line, no lateral joint line tenderness, no patellar tendon tenderness, no pain of MCL, no pain at LCL  o ROM  o : full extension, full flexion  o Strength  o : full extension, full flexion  o Special Tests  o : negative ballotable effusion , negtive fluid wave, negative patellar compression, negative patellar apprehenison, negative Kane's test, negative Apley's test, negative anterior drawer, negative posterior drawer, negative lachman's drawer , negative varus stress, negaitve valgus stress  o Neurovascular  o : Full sensation, Dorsal Pedal Pulse 2+, posteriror tibialis pulse 2+          Assessment  · Aftercare following surgery of the muskuloskeletal system     V54.81  · Left knee pain, unspecified chronicity     719.46/M25.562      Plan  · Orders  o Depo-Medrol injection 80mg () - - 09/16/2020   Lot [id number] Exp 07 2021 [employer] Administered by SERVANDO PENG PA-C   o Knee Intra-articular Injection without US Guidance [facility] (70975) - - 09/16/2020   Lot [id number] Exp 08 01 2021 [employer] Administered by SERVANDO PENG PA-C   · Medications  o Medications have been Reconciled  o Transition of Care or Provider Policy  · Instructions  o Reviewed the patient's Past Medical, Social, and Family history as well as the ROS at today's visit, no changes.  o Call or return if worsening symptoms.  o Exercise handout given.  o Follow Up in 3 weeks.  o Electronically Identified Patient Education Materials Provided Electronically     Continue physical therapy  Discontinue Voltaren 75mg  Prescribed Meloxicam 15mg one po Daily #30 with food  Follow up 3 weeks             Electronically Signed by:  Xochitl Trinh PA-C -Author on September 16, 2020 10:06:54 AM  Electronically Co-signed by: Carl Swartz MD -Reviewer on September 16, 2020 09:15:11 PM

## 2021-05-14 VITALS
DIASTOLIC BLOOD PRESSURE: 84 MMHG | HEIGHT: 67 IN | SYSTOLIC BLOOD PRESSURE: 141 MMHG | SYSTOLIC BLOOD PRESSURE: 130 MMHG | OXYGEN SATURATION: 99 % | RESPIRATION RATE: 12 BRPM | BODY MASS INDEX: 30.45 KG/M2 | TEMPERATURE: 96.6 F | WEIGHT: 194 LBS | HEART RATE: 78 BPM | DIASTOLIC BLOOD PRESSURE: 64 MMHG

## 2021-05-14 VITALS — HEIGHT: 67 IN | BODY MASS INDEX: 30.31 KG/M2 | WEIGHT: 193.12 LBS | OXYGEN SATURATION: 97 % | HEART RATE: 85 BPM

## 2021-05-14 VITALS — BODY MASS INDEX: 32.2 KG/M2 | WEIGHT: 200.37 LBS | HEIGHT: 66 IN

## 2021-05-14 VITALS — HEART RATE: 83 BPM | WEIGHT: 196 LBS | OXYGEN SATURATION: 98 % | HEIGHT: 66 IN | BODY MASS INDEX: 31.5 KG/M2

## 2021-05-14 VITALS — BODY MASS INDEX: 30.29 KG/M2 | HEIGHT: 67 IN | OXYGEN SATURATION: 98 % | WEIGHT: 193 LBS | HEART RATE: 74 BPM

## 2021-05-14 VITALS — HEIGHT: 67 IN | BODY MASS INDEX: 30.29 KG/M2 | HEART RATE: 81 BPM | WEIGHT: 193 LBS | OXYGEN SATURATION: 99 %

## 2021-05-14 VITALS — WEIGHT: 193.5 LBS | HEIGHT: 67 IN | OXYGEN SATURATION: 99 % | BODY MASS INDEX: 30.37 KG/M2 | HEART RATE: 80 BPM

## 2021-05-14 VITALS — HEIGHT: 67 IN | OXYGEN SATURATION: 98 % | HEART RATE: 84 BPM | WEIGHT: 191.25 LBS | BODY MASS INDEX: 30.02 KG/M2

## 2021-05-14 NOTE — PROGRESS NOTES
Progress Note      Patient Name: Amber Poole   Patient ID: [id number]   Sex: Female   YOB: 1965    Primary Care Provider: Rudy Razo MD   Referring Provider: Rudy Razo MD    Visit Date: March 15, 2021    Provider: Carl Swartz MD   Location: [facility]   Location Address: [address] [place] [address]  [id number]   Location Phone: [phone]          Chief Complaint  · Left knee pain       History Of Present Illness  Amber Poole is a 55 year old /White female who presents today to [facility].      Patient presents today for a follow-up of left knee pain, left knee osteoarthritis. Patient has a history of left knee arthroscopic partial medial meniscectomy, chondroplasty of medial femoral condyle, chondroplasty of patella performed 20 by Dr. Swartz. She has been having increasing pain in her left knee due to her arthritis. She states pain along the joint lines with no known injury or trauma. She also has pain with prolonged sitting and standing. She presents today requesting an injection in her left knee.       Past Medical History  Allergic rhinitis, chronic; Allergies; Arthritis; Blood Clotting Disorder; Hypercoagulable state; Hypothyroidism; Seasonal allergies; Thyroid disorder; Torn meniscus         Past Surgical History  MENISCUS TEAR; Tonsillectomy; Uterine ablation         Medication List  Aspir-81 81 mg oral tablet,delayed release (DR/EC); Claritin 10 mg oral tablet; etodolac 400 mg oral tablet; levothyroxine 200 mcg oral capsule; Singulair 10 mg oral tablet; Synthroid 25 mcg oral tablet; Vitamin C 500 mg oral tablet; Voltaren 1 % topical gel         Allergy List  PENICILLINS       Allergies Reconciled  Family Medical History  Liver Neoplasm, Malignant; Lung Neoplasm, Malignant; Heart Disease; Cancer, Unspecified; Diabetes, unspecified type; Leukemia; Renal Calculus; Bladder calculus         Reproductive History   3 Para 0 0 0 0  "      Social History  Alcohol (Never); Alcohol Use (Never); Caffeine (Current some day); Farmer; lives with spouse; ; .; 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); Second hand smoke exposure (Never); Tobacco (Never); Working         Immunizations  Name Date Admin   Influenza 10/01/2020         Review of Systems  · Constitutional  o Denies  o : fever, chills, weight loss  · Cardiovascular  o Denies  o : chest pain, shortness of breath  · Gastrointestinal  o Denies  o : liver disease, heartburn, nausea, blood in stools  · Genitourinary  o Denies  o : painful urination, blood in urine  · Integument  o Denies  o : rash, itching  · Neurologic  o Denies  o : headache, weakness, loss of consciousness  · Musculoskeletal  o Denies  o : painful, swollen joints  · Psychiatric  o Denies  o : drug/alcohol addiction, anxiety, depression      Vitals  Date Time BP Position Site L\R Cuff Size HR RR TEMP (F) WT  HT  BMI kg/m2 BSA m2 O2 Sat FR L/min FiO2 HC       03/15/2021 09:18 AM      83 - R   195lbs 16oz 5'  6\" 31.63 2.03 98 %            Physical Examination  · Constitutional  o Appearance  o : well developed, well-nourished, no obvious deformities present  · Head and Face  o Head  o :   § Inspection  § : normocephalic  o Face  o :   § Inspection  § : no facial lesions  · Eyes  o Conjunctivae  o : conjunctivae normal  o Sclerae  o : sclerae white  · Ears, Nose, Mouth and Throat  o Ears  o :   § External Ears  § : appearance within normal limits  § Hearing  § : intact  o Nose  o :   § External Nose  § : appearance normal  · Neck  o Inspection/Palpation  o : normal appearance  o Range of Motion  o : full range of motion  · Respiratory  o Respiratory Effort  o : breathing unlabored  o Inspection of Chest  o : normal appearance  o Auscultation of Lungs  o : no audible wheezing or rales  · Cardiovascular  o Heart  o : regular rate  · Gastrointestinal  o Abdominal Examination  o : soft and non-tender  · Skin and Subcutaneous " Tissue  o General Inspection  o : intact, no rashes  · Psychiatric  o General  o : Alert and oriented x3  o Judgement and Insight  o : judgment and insight intact  o Mood and Affect  o : mood normal, affect appropriate  · Left Knee  o Inspection  o : Sensation grossly intact. Neurovascular intact. Well healed scars. No swelling, skin discoloration or atrophy. Calf supple, non-tender. Stable to valgus/varus stress. Good strength in quadriceps, hamstrings, dorsiflexors, and plantar flexors. Tender medial joint line. Tender lateral joint line. Full weight bearing. Non-antalgic gait. Pulses are pleasant.  · Injection Note/Aspiration Note  o Site  o : left knee   o Procedure  o : Procedure: After educating the patient, patient gave consent for procedure. After using Chloraprep, the joint space was injected. The patient tolerated the procedure well.   o Medication  o : 32 Units of Zilretta with 5cc of 1% Lidocaine          Assessment  · Primary osteoarthritis of left knee     715.16/M17.12      Plan  · Orders  o Zilretta- 1 mg. () () - 715.16/M17.12 - 03/15/2021   Lot [id number] Exp 04 06 2021 [employer] Administered by LUZ ELENA Swartz MD  o Knee Intra-articular Injection without US Guidance [facility] ([id number]) - 715.16/M17.12 - 03/15/2021   Lot [id number] Exp 03 01 2022 [employer] Administered by LUZ ELENA Swartz MD  · Medications  o Medications have been Reconciled  o Transition of Care or Provider Policy  · Instructions  o Dr. Swartz saw and examined the patient and agrees with plan.   o Reviewed the patient's Past Medical, Social, and Family history as well as the ROS at today's visit, no changes.  o Call or return if worsening symptoms.  o Follow Up PRN.  o The above service was scribed by Duyen Lockwood on my behalf and I attest to the accuracy of the note. simi  o Discussed treatment plans with the patient. Patient received a left knee injection and tolerated it well.            Electronically Signed by: Duyen  Mandie-, Other -Author on March 15, 2021 04:25:07 PM  Electronically Co-signed by: Carl Swartz MD -Reviewer on March 17, 2021 05:34:24 PM

## 2021-05-14 NOTE — PROGRESS NOTES
Progress Note      Patient Name: Amber Poole   Patient ID: [id number]   Sex: Female   YOB: 1965    Primary Care Provider: Rudy Razo MD   Referring Provider: Rudy Razo MD    Visit Date: February 17, 2021    Provider: Xochitl Trinh PA-C   Location: [facility]   Location Address: [address] [place] [address]  [id number]   Location Phone: [phone]          Chief Complaint  · Left knee pain       History Of Present Illness  Amber Poole is a 55 year old /White female who presents today to [facility].      Patient is status post left knee arthroscopic partial medial meniscectomy, chondroplasty of medial femoral condyle, chondroplasty of patella performed 8/6/20 by Dr. Swartz. Patient states 1 week ago swelling and pain  in left knee without injury or trauma. Patient is taking Voltaren 75mg and using Voltaren Gel. Patient states left knee pain has subsided today.  Patient occupation is a .                Past Medical History  Allergic rhinitis, chronic; Arthritis; Hypothyroidism; Seasonal allergies; Thyroid disorder         Past Surgical History  Tonsillectomy         Medication List  Aspir-81 81 mg oral tablet,delayed release (DR/EC); Claritin 10 mg oral tablet; diclofenac sodium 75 mg oral tablet,delayed release (DR/EC); ibuprofen 800 mg oral tablet; levothyroxine 125 mcg oral tablet; Singulair 10 mg oral tablet; Vitamin C 500 mg oral tablet         Allergy List  PENICILLINS         Family Medical History  Liver Neoplasm, Malignant; Lung Neoplasm, Malignant; Heart Disease; Cancer, Unspecified; Diabetes, unspecified type; Renal Calculus; Bladder calculus         Social History  Alcohol (Never); Alcohol Use (Never); Caffeine (Current some day); Farmer; lives with spouse; ; .; 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); Second hand smoke exposure (Never); Tobacco (Never); Working         Review of Systems  · Constitutional  o Denies  o :  "fever, chills, weight loss  · Cardiovascular  o Denies  o : chest pain, shortness of breath  · Gastrointestinal  o Denies  o : liver disease, heartburn, nausea, blood in stools  · Genitourinary  o Denies  o : painful urination, blood in urine  · Integument  o Denies  o : rash, itching  · Neurologic  o Denies  o : headache, weakness, loss of consciousness  · Musculoskeletal  o Denies  o : painful, swollen joints  · Psychiatric  o Denies  o : drug/alcohol addiction, anxiety, depression      Vitals  Date Time BP Position Site L\R Cuff Size HR RR TEMP (F) WT  HT  BMI kg/m2 BSA m2 O2 Sat FR L/min FiO2 HC       02/17/2021 09:54 AM      80 - R   193lbs 8oz 5'  7\" 30.31 2.04 99 %            Physical Examination  · Constitutional  o Appearance  o : well developed, well-nourished, no obvious deformities present  · Head and Face  o Head  o :   § Inspection  § : normocephalic  o Face  o :   § Inspection  § : no facial lesions  · Eyes  o Conjunctivae  o : conjunctivae normal  o Sclerae  o : sclerae white  · Ears, Nose, Mouth and Throat  o Ears  o :   § External Ears  § : appearance within normal limits  § Hearing  § : intact  o Nose  o :   § External Nose  § : appearance normal  · Neck  o Inspection/Palpation  o : normal appearance  o Range of Motion  o : full range of motion  · Respiratory  o Respiratory Effort  o : breathing unlabored  o Inspection of Chest  o : normal appearance  o Auscultation of Lungs  o : no audible wheezing or rales  · Cardiovascular  o Heart  o : regular rate  · Gastrointestinal  o Abdominal Examination  o : soft and non-tender  · Skin and Subcutaneous Tissue  o General Inspection  o : intact, no rashes  · Psychiatric  o General  o : Alert and oriented x3  o Judgement and Insight  o : judgment and insight intact  o Mood and Affect  o : mood normal, affect appropriate  · Left Knee-Street  o Inspection  o : scars present, no limping gait, weight bearing, mild swelling, no ecchymosis, no atrophy, neutral " alignment  o Palpation  o : no medial joint line tenderness, no lateral joint line tenderness, no patellar tendon tenderness, no pain of MCL, no pain at LCL  o ROM  o : full extension, full flexion  o Strength  o : full extension, full flexion  o Special Tests  o : negative varus stress, negaitve valgus stress  o Neurovascular  o : Full sensation, Dorsal Pedal Pulse 2+, posteriror tibialis pulse 2+          Assessment  · Primary osteoarthritis of left knee     715.16/M17.12  · Aftercare following surgery of the muskuloskeletal system     V54.81  · Left knee pain, unspecified chronicity     719.46/M25.562      Plan  · Medications  o Medications have been Reconciled  o Transition of Care or Provider Policy  · Instructions  o Reviewed the patient's Past Medical, Social, and Family history as well as the ROS at today's visit, no changes.  o Call or return if worsening symptoms.  o Electronically Identified Patient Education Materials Provided Electronically     Zilretta approval  Prescribed Voltaren Gel             Electronically Signed by: Xochitl Trinh PA-C -Author on February 17, 2021 10:05:02 AM  Electronically Co-signed by: Carl Swartz MD -Reviewer on February 17, 2021 08:53:09 PM

## 2021-05-15 VITALS — HEIGHT: 67 IN | WEIGHT: 180 LBS | BODY MASS INDEX: 28.25 KG/M2

## 2021-05-15 VITALS — OXYGEN SATURATION: 97 % | HEIGHT: 67 IN | HEART RATE: 80 BPM | BODY MASS INDEX: 29.86 KG/M2 | WEIGHT: 190.25 LBS

## 2021-05-15 VITALS — BODY MASS INDEX: 30.45 KG/M2 | HEIGHT: 67 IN | WEIGHT: 194 LBS | OXYGEN SATURATION: 98 % | HEART RATE: 82 BPM

## 2021-05-15 VITALS — WEIGHT: 188 LBS | HEIGHT: 67 IN | OXYGEN SATURATION: 98 % | HEART RATE: 75 BPM | BODY MASS INDEX: 29.51 KG/M2

## 2021-05-18 ENCOUNTER — HOSPITAL ENCOUNTER (OUTPATIENT)
Dept: PREADMISSION TESTING | Facility: HOSPITAL | Age: 56
Discharge: HOME | End: 2021-05-18
Attending: SURGERY

## 2021-05-18 LAB — SARS-COV-2 RNA SPEC QL NAA+PROBE: NOT DETECTED

## 2021-05-19 ENCOUNTER — HOSPITAL ENCOUNTER (OUTPATIENT)
Dept: OTHER | Facility: HOSPITAL | Age: 56
Discharge: HOME | End: 2021-05-19
Attending: NURSE PRACTITIONER

## 2021-05-20 ENCOUNTER — HOSPITAL ENCOUNTER (OUTPATIENT)
Dept: PERIOP | Facility: HOSPITAL | Age: 56
Setting detail: HOSPITAL OUTPATIENT SURGERY
Discharge: HOME | End: 2021-05-20
Attending: SURGERY

## 2021-05-22 ENCOUNTER — TRANSCRIBE ORDERS (OUTPATIENT)
Dept: ADMINISTRATIVE | Facility: HOSPITAL | Age: 56
End: 2021-05-22

## 2021-05-22 DIAGNOSIS — Z12.31 BREAST CANCER SCREENING BY MAMMOGRAM: Primary | ICD-10-CM

## 2021-05-27 ENCOUNTER — OFFICE VISIT CONVERTED (OUTPATIENT)
Dept: SURGERY | Facility: CLINIC | Age: 56
End: 2021-05-27
Attending: SURGERY

## 2021-06-03 NOTE — PROCEDURES
Patient: OSCAR CANNON     Acct: [id number]     Report: #[id number]  MR #:  [id number]     DOS: 2021 1050     : 1965  DICTATING: John Esqueda  ***Signed***  --------------------------------------------------------------------------------------------------------------------  Post Procedure/Operative Note      Date       21            Pre-Operative Diagnosis:      Hemorrhoids/      rectal bleeding            Post-Operative Diagnosis:      Same as pre-op diagnosis            Surgeon/Assistants      Surgeon      Dheeraj            Anesthesia      General            Procedure Performed/Technique      EUA, colonoscopy, int/ext hemorrhoidectomy x 2            Specimen/Tissue Removed:            hemorrhoids            Findings:            int/ext hemorrhoids            Complications:      No            Estimated Blood Loss:      2 cc            John Esqueda                      May 20, 2021 10:50      Electronically signed by John Esqueda  2021 10:50     Disclaimer: Converted hospital document may not contain table formatting or lab diagrams. Please see [employer] for authenticated document.

## 2021-06-05 NOTE — PROGRESS NOTES
Progress Note      Patient Name: Amber Poole   Patient ID: [id number]   Sex: Female   YOB: 1965    Primary Care Provider: Rudy Razo MD   Referring Provider: Rudy Razo MD    Visit Date: May 27, 2021    Provider: John Esqueda MD   Location: [facility]   Location Address: [address] [place] [address]  [id number]   Location Phone: [phone]          Chief Complaint  · Follow Up Surgery      History Of Present Illness     Ms. Poole is seen in follow-up status post hemorrhoidectomy. She notes some bleeding although notes this is decreased.       Past Medical History  Allergic rhinitis, chronic; Allergies; Arthritis; Blood Clotting Disorder; Hypercoagulable state; Hypothyroidism; Seasonal allergies; Thyroid disorder; Torn meniscus         Past Surgical History  MENISCUS TEAR; Tonsillectomy; Uterine ablation         Medication List  Aspir-81 81 mg oral tablet,delayed release (DR/EC); Claritin 10 mg oral tablet; etodolac 400 mg oral tablet; levothyroxine 200 mcg oral capsule; lidocaine 5 % topical ointment; Singulair 10 mg oral tablet; Synthroid 25 mcg oral tablet; Vitamin C 500 mg oral tablet; Voltaren 1 % topical gel         Allergy List  PENICILLINS         Family Medical History  Liver Neoplasm, Malignant; Lung Neoplasm, Malignant; Heart Disease; Cancer, Unspecified; Diabetes, unspecified type; Leukemia; Renal Calculus; Bladder calculus         Reproductive History   3 Para 0 0 0 0       Social History  Alcohol Use; Caffeine (Current some day); Farmer; lives with spouse; ; .; 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); Second hand smoke exposure (Never); Tobacco (Never); Working         Immunizations  Name Date Admin   Influenza 10/01/2020         Review of Systems  · Cardiovascular  o Denies  o : chest pain on exertion, shortness of breath, lower extremity swelling  · Respiratory  o Denies  o : wheezing, chronic cough, coughing up  "blood  · Gastrointestinal  o Denies  o : diarrhea, chronic abdominal pain, reflux symptoms      Vitals  Date Time BP Position Site L\R Cuff Size HR RR TEMP (F) WT  HT  BMI kg/m2 BSA m2 O2 Sat FR L/min FiO2 HC       05/27/2021 01:57 PM         202lbs 2oz 5'  6\" 32.62 2.07                 Assessment  · Encounter for examination following surgery     V67.00/Z09      Plan  · Medications  o Medications have been Reconciled  o Transition of Care or Provider Policy  · Instructions  o Electronically Identified Patient Education Materials Provided Electronically     Continue stool softeners. Follow-up on a PRN basis.             Electronically Signed by: Tricia Leslie-, -Author on May 28, 2021 02:11:03 PM  Electronically Co-signed by: John Esqueda MD -Reviewer on Megan 3, 2021 03:13:42 PM  "

## 2021-06-09 NOTE — TELEPHONE ENCOUNTER
CALLED PATIENT TO LET HER KNOW DOCUSATE 240 MG COULD BE BOUGHT OVER THE COUNTER. PATIENT ASKED IF IT COULD BE PRESCRIBED DUE TO SHE HAS ALREADY MET HER DEDUCTIBLE FOR THE YEAR.

## 2021-06-14 NOTE — TELEPHONE ENCOUNTER
Insurance will not pay for this because it is an over the counter medication. She will need to purchase it OTC.

## 2021-06-16 NOTE — TELEPHONE ENCOUNTER
Patient called and I told her she will need to purchase the medication OTC, as insurance will not cover it.

## 2021-06-25 ENCOUNTER — HOSPITAL ENCOUNTER (OUTPATIENT)
Dept: MAMMOGRAPHY | Facility: HOSPITAL | Age: 56
Discharge: HOME | End: 2021-06-25
Admitting: NURSE PRACTITIONER

## 2021-06-25 DIAGNOSIS — Z12.31 BREAST CANCER SCREENING BY MAMMOGRAM: ICD-10-CM

## 2021-06-25 PROCEDURE — 77067 SCR MAMMO BI INCL CAD: CPT

## 2021-06-25 PROCEDURE — 77063 BREAST TOMOSYNTHESIS BI: CPT

## 2021-07-15 VITALS — BODY MASS INDEX: 32.48 KG/M2 | WEIGHT: 202.12 LBS | HEIGHT: 66 IN

## 2021-08-16 ENCOUNTER — OFFICE VISIT (OUTPATIENT)
Dept: FAMILY MEDICINE CLINIC | Facility: CLINIC | Age: 56
End: 2021-08-16

## 2021-08-16 VITALS
HEART RATE: 79 BPM | TEMPERATURE: 98.4 F | BODY MASS INDEX: 31.45 KG/M2 | SYSTOLIC BLOOD PRESSURE: 139 MMHG | DIASTOLIC BLOOD PRESSURE: 83 MMHG | OXYGEN SATURATION: 95 % | RESPIRATION RATE: 12 BRPM | HEIGHT: 67 IN | WEIGHT: 200.4 LBS

## 2021-08-16 DIAGNOSIS — D64.9 LOW HEMOGLOBIN: ICD-10-CM

## 2021-08-16 DIAGNOSIS — M19.90 ARTHRITIS: ICD-10-CM

## 2021-08-16 DIAGNOSIS — Z13.220 SCREENING, LIPID: ICD-10-CM

## 2021-08-16 DIAGNOSIS — J30.2 SEASONAL ALLERGIC RHINITIS, UNSPECIFIED TRIGGER: ICD-10-CM

## 2021-08-16 DIAGNOSIS — D64.9 ANEMIA, UNSPECIFIED TYPE: ICD-10-CM

## 2021-08-16 DIAGNOSIS — E03.9 ACQUIRED HYPOTHYROIDISM: Primary | ICD-10-CM

## 2021-08-16 DIAGNOSIS — R19.8 STRAINING WITH STOOLS: ICD-10-CM

## 2021-08-16 DIAGNOSIS — R05.9 COUGH: ICD-10-CM

## 2021-08-16 PROBLEM — D68.9 COAGULATION DISORDER: Status: ACTIVE | Noted: 2021-08-16

## 2021-08-16 PROBLEM — D68.59 HYPERCOAGULABLE STATE: Status: ACTIVE | Noted: 2021-08-16

## 2021-08-16 LAB
ALBUMIN SERPL-MCNC: 4 G/DL (ref 3.5–5.2)
ALBUMIN/GLOB SERPL: 1.2 G/DL
ALP SERPL-CCNC: 122 U/L (ref 39–117)
ALT SERPL W P-5'-P-CCNC: 32 U/L (ref 1–33)
ANION GAP SERPL CALCULATED.3IONS-SCNC: 10.8 MMOL/L (ref 5–15)
AST SERPL-CCNC: 42 U/L (ref 1–32)
BASOPHILS # BLD AUTO: 0.01 10*3/MM3 (ref 0–0.2)
BASOPHILS NFR BLD AUTO: 0.2 % (ref 0–1.5)
BILIRUB SERPL-MCNC: 0.4 MG/DL (ref 0–1.2)
BUN SERPL-MCNC: 10 MG/DL (ref 6–20)
BUN/CREAT SERPL: 14.5 (ref 7–25)
CALCIUM SPEC-SCNC: 9.1 MG/DL (ref 8.6–10.5)
CHLORIDE SERPL-SCNC: 108 MMOL/L (ref 98–107)
CHOLEST SERPL-MCNC: 137 MG/DL (ref 0–200)
CO2 SERPL-SCNC: 25.2 MMOL/L (ref 22–29)
CREAT SERPL-MCNC: 0.69 MG/DL (ref 0.57–1)
DEPRECATED RDW RBC AUTO: 42.4 FL (ref 37–54)
EOSINOPHIL # BLD AUTO: 0.03 10*3/MM3 (ref 0–0.4)
EOSINOPHIL NFR BLD AUTO: 0.6 % (ref 0.3–6.2)
ERYTHROCYTE [DISTWIDTH] IN BLOOD BY AUTOMATED COUNT: 13.7 % (ref 12.3–15.4)
FERRITIN SERPL-MCNC: 91.6 NG/ML (ref 13–150)
GFR SERPL CREATININE-BSD FRML MDRD: 89 ML/MIN/1.73
GLOBULIN UR ELPH-MCNC: 3.4 GM/DL
GLUCOSE SERPL-MCNC: 89 MG/DL (ref 65–99)
HCT VFR BLD AUTO: 41 % (ref 34–46.6)
HDLC SERPL-MCNC: 36 MG/DL (ref 40–60)
HGB BLD-MCNC: 13.5 G/DL (ref 12–15.9)
IMM GRANULOCYTES # BLD AUTO: 0.02 10*3/MM3 (ref 0–0.05)
IMM GRANULOCYTES NFR BLD AUTO: 0.4 % (ref 0–0.5)
IRON 24H UR-MRATE: 27 MCG/DL (ref 37–145)
IRON SATN MFR SERPL: 7 % (ref 20–50)
LDLC SERPL CALC-MCNC: 81 MG/DL (ref 0–100)
LDLC/HDLC SERPL: 2.19 {RATIO}
LYMPHOCYTES # BLD AUTO: 1.18 10*3/MM3 (ref 0.7–3.1)
LYMPHOCYTES NFR BLD AUTO: 22.3 % (ref 19.6–45.3)
MCH RBC QN AUTO: 28 PG (ref 26.6–33)
MCHC RBC AUTO-ENTMCNC: 32.9 G/DL (ref 31.5–35.7)
MCV RBC AUTO: 85.1 FL (ref 79–97)
MONOCYTES # BLD AUTO: 0.47 10*3/MM3 (ref 0.1–0.9)
MONOCYTES NFR BLD AUTO: 8.9 % (ref 5–12)
NEUTROPHILS NFR BLD AUTO: 3.59 10*3/MM3 (ref 1.7–7)
NEUTROPHILS NFR BLD AUTO: 67.6 % (ref 42.7–76)
NRBC BLD AUTO-RTO: 0 /100 WBC (ref 0–0.2)
PLATELET # BLD AUTO: 244 10*3/MM3 (ref 140–450)
PMV BLD AUTO: 11 FL (ref 6–12)
POTASSIUM SERPL-SCNC: 4.6 MMOL/L (ref 3.5–5.2)
PROT SERPL-MCNC: 7.4 G/DL (ref 6–8.5)
RBC # BLD AUTO: 4.82 10*6/MM3 (ref 3.77–5.28)
SODIUM SERPL-SCNC: 144 MMOL/L (ref 136–145)
T4 FREE SERPL-MCNC: 1.23 NG/DL (ref 0.93–1.7)
TIBC SERPL-MCNC: 364 MCG/DL (ref 298–536)
TRANSFERRIN SERPL-MCNC: 244 MG/DL (ref 200–360)
TRIGL SERPL-MCNC: 110 MG/DL (ref 0–150)
TSH SERPL DL<=0.05 MIU/L-ACNC: 3.88 UIU/ML (ref 0.27–4.2)
VLDLC SERPL-MCNC: 20 MG/DL (ref 5–40)
WBC # BLD AUTO: 5.3 10*3/MM3 (ref 3.4–10.8)

## 2021-08-16 PROCEDURE — 80053 COMPREHEN METABOLIC PANEL: CPT | Performed by: NURSE PRACTITIONER

## 2021-08-16 PROCEDURE — 85025 COMPLETE CBC W/AUTO DIFF WBC: CPT | Performed by: NURSE PRACTITIONER

## 2021-08-16 PROCEDURE — 82728 ASSAY OF FERRITIN: CPT | Performed by: NURSE PRACTITIONER

## 2021-08-16 PROCEDURE — 84439 ASSAY OF FREE THYROXINE: CPT | Performed by: NURSE PRACTITIONER

## 2021-08-16 PROCEDURE — 84443 ASSAY THYROID STIM HORMONE: CPT | Performed by: NURSE PRACTITIONER

## 2021-08-16 PROCEDURE — 84466 ASSAY OF TRANSFERRIN: CPT | Performed by: NURSE PRACTITIONER

## 2021-08-16 PROCEDURE — 80061 LIPID PANEL: CPT | Performed by: NURSE PRACTITIONER

## 2021-08-16 PROCEDURE — 99214 OFFICE O/P EST MOD 30 MIN: CPT | Performed by: NURSE PRACTITIONER

## 2021-08-16 PROCEDURE — 83540 ASSAY OF IRON: CPT | Performed by: NURSE PRACTITIONER

## 2021-08-16 RX ORDER — LEVOTHYROXINE SODIUM 0.03 MG/1
25 TABLET ORAL DAILY
Qty: 90 TABLET | Refills: 1 | Status: SHIPPED | OUTPATIENT
Start: 2021-08-16 | End: 2022-02-02

## 2021-08-16 RX ORDER — ETODOLAC 400 MG/1
400 TABLET, FILM COATED ORAL 2 TIMES DAILY
Qty: 180 TABLET | Refills: 1 | Status: SHIPPED | OUTPATIENT
Start: 2021-08-16 | End: 2021-08-17 | Stop reason: SDUPTHER

## 2021-08-16 RX ORDER — MONTELUKAST SODIUM 10 MG/1
10 TABLET ORAL NIGHTLY
Qty: 90 TABLET | Refills: 1 | Status: SHIPPED | OUTPATIENT
Start: 2021-08-16 | End: 2022-02-02

## 2021-08-16 RX ORDER — UREA 10 %
LOTION (ML) TOPICAL
COMMUNITY
End: 2022-03-02

## 2021-08-16 RX ORDER — DOCUSATE CALCIUM 240 MG
240 CAPSULE ORAL 2 TIMES DAILY
COMMUNITY
End: 2021-08-16 | Stop reason: SDUPTHER

## 2021-08-16 RX ORDER — BENZONATATE 100 MG/1
100 CAPSULE ORAL 3 TIMES DAILY PRN
COMMUNITY
End: 2021-08-16 | Stop reason: SDUPTHER

## 2021-08-16 RX ORDER — LEVOTHYROXINE SODIUM 0.2 MG/1
TABLET ORAL
COMMUNITY
Start: 2021-05-18 | End: 2021-08-16 | Stop reason: SDUPTHER

## 2021-08-16 RX ORDER — ACETAMINOPHEN 160 MG
2000 TABLET,DISINTEGRATING ORAL DAILY
COMMUNITY

## 2021-08-16 RX ORDER — DOCUSATE CALCIUM 240 MG
240 CAPSULE ORAL 2 TIMES DAILY
Qty: 180 CAPSULE | Refills: 1 | Status: SHIPPED | OUTPATIENT
Start: 2021-08-16 | End: 2022-09-13 | Stop reason: SDUPTHER

## 2021-08-16 RX ORDER — MONTELUKAST SODIUM 10 MG/1
TABLET ORAL
COMMUNITY
End: 2021-08-16 | Stop reason: SDUPTHER

## 2021-08-16 RX ORDER — ETODOLAC 400 MG/1
TABLET, FILM COATED ORAL
COMMUNITY
Start: 2021-05-18 | End: 2021-08-16 | Stop reason: SDUPTHER

## 2021-08-16 RX ORDER — ALBUTEROL SULFATE 90 UG/1
2 AEROSOL, METERED RESPIRATORY (INHALATION) EVERY 4 HOURS PRN
Qty: 9 G | Refills: 1 | Status: SHIPPED | OUTPATIENT
Start: 2021-08-16 | End: 2023-03-30

## 2021-08-16 RX ORDER — LEVOTHYROXINE SODIUM 0.2 MG/1
200 TABLET ORAL DAILY
Qty: 90 TABLET | Refills: 1 | Status: SHIPPED | OUTPATIENT
Start: 2021-08-16 | End: 2022-02-02

## 2021-08-16 RX ORDER — BENZONATATE 100 MG/1
100 CAPSULE ORAL 3 TIMES DAILY PRN
Qty: 30 CAPSULE | Refills: 2 | Status: SHIPPED | OUTPATIENT
Start: 2021-08-16 | End: 2021-11-22

## 2021-08-16 RX ORDER — LEVOTHYROXINE SODIUM 0.03 MG/1
TABLET ORAL
COMMUNITY
End: 2021-08-16 | Stop reason: SDUPTHER

## 2021-08-16 RX ORDER — LORATADINE 10 MG/1
TABLET ORAL
COMMUNITY
End: 2022-09-13 | Stop reason: SDUPTHER

## 2021-08-16 RX ORDER — ASPIRIN 81 MG/1
TABLET ORAL
COMMUNITY

## 2021-08-16 NOTE — ASSESSMENT & PLAN NOTE
She was on iron back in February.  Her labs did come back up in May and prior to hemorrhoid surgery the surgeon asked her to stop her iron 2 weeks prior.  She has not been on any iron supplements since then.  She is taking Dulcolax for constipation.  She feels that if she does not go to the bathroom every day than it is every other day but she will strain a little bit and she is worried that she will calls another hemorrhoid.  She requests that I refill medicine today.  Follow-up 6 months.  If there is any rectal bleeding let me know immediately.

## 2021-08-16 NOTE — ASSESSMENT & PLAN NOTE
She is taking her Singulair on a daily basis.  She has been having this cough for couple of weeks it is a dry nagging cough.  She has been taking a prescription of Tessalon Perles and request a refill.  She is not coughing anything up.  She does not have a history of smoking.  She is not been around cigarette smoke.  We will continue with the Tessalon and do an inhaler.  She is aware to rinse her mouth out after she uses 2 puffs.  She will keep me posted in the next week to 2 weeks.  If not better we will start with a chest x-ray.  I reviewed medication and there is not a medication that has a constant cough.  She declines a SARS or Covid test today.  She feels fine otherwise.

## 2021-08-16 NOTE — PROGRESS NOTES
Chief Complaint  Hypothyroidism (follow up) and Med Refill    Subjective          Amber Poole presents to [facility]  History of Present Illness  She is here for refills and lab work.  She is fasting today.  She has had a cough for around 2 weeks.  She is not coughing anything up.  She said it is a dry cough.  She is requesting refill of Tessalon Perles which has helped the cough some.  No smoking noted.    Past Medical History:   • Allergies   • Arthritis   • Blood clotting disorder (CMS/HCC)   • Chronic allergic rhinitis   • Hypercoagulable state (CMS/HCC)   • Hypothyroidism   • Seasonal allergies   • Thyroid disorder   • Torn meniscus    LEFT KNEE       Allergies  Penicillins    Past Surgical History:   • KNEE CARTILAGE SURGERY    BEEN 08.2020   • LASER ABLATION    UTERINE, N[place]ERRS   • TONSILLECTOMY       Social History     Tobacco Use   • Smoking status: Never Smoker   • Smokeless tobacco: Never Used   Substance Use Topics   • Alcohol use: Never   • Drug use: Never       Family History   Problem Relation Age of Onset   • Diabetes Mother    • Kidney nephrosis Mother    • Heart disease Father    • Kidney nephrosis Maternal Grandmother    • Other Maternal Grandmother         BLADDER CALCULUS   • Liver cancer Other         UNSPECIFIED   • Lung cancer Other         UNSPECIFIED   • Cancer Daughter    • Leukemia Daughter    • Leukemia Son         Health Maintenance Due   Topic Date Due   • COLORECTAL CANCER SCREENING  Never done   • ANNUAL PHYSICAL  Never done   • COVID-19 Vaccine (1) Never done   • TDAP/TD VACCINES (1 - Tdap) Never done   • ZOSTER VACCINE (1 of 2) Never done   • HEPATITIS C SCREENING  Never done   • PAP SMEAR  Never done          Current Outpatient Medications:   •  aspirin (aspirin) 81 MG EC tablet, , Disp: , Rfl:   •  benzonatate (TESSALON) 100 MG capsule, Take 1 capsule by mouth 3 (Three) Times a Day As Needed for Cough., Disp: 30 capsule, Rfl: 2  •   Cholecalciferol (Vitamin D3) 50 MCG (2000 UT) capsule, Take 2,000 Units by mouth Daily., Disp: , Rfl:   •  Diclofenac Sodium (Voltaren) 1 % gel gel, Apply 4 g topically to the appropriate area as directed 4 (Four) Times a Day., Disp: 100 g, Rfl: 2  •  docusate calcium (SURFAK) 240 MG capsule, Take 1 capsule by mouth 2 (Two) Times a Day., Disp: 180 capsule, Rfl: 1  •  etodolac (LODINE) 400 MG tablet, Take 1 tablet by mouth 2 (Two) Times a Day., Disp: 180 tablet, Rfl: 1  •  levothyroxine (SYNTHROID, LEVOTHROID) 200 MCG tablet, Take 1 tablet by mouth Daily., Disp: 90 tablet, Rfl: 1  •  levothyroxine (SYNTHROID, LEVOTHROID) 25 MCG tablet, Take 1 tablet by mouth Daily., Disp: 90 tablet, Rfl: 1  •  loratadine (CLARITIN) 10 MG tablet, loratadine 10 mg oral tablet take 1 tablet (10 mg) by oral route once daily   Suspended, Disp: , Rfl:   •  melatonin 1 MG tablet, Take  by mouth., Disp: , Rfl:   •  montelukast (Singulair) 10 MG tablet, Take 1 tablet by mouth Every Night., Disp: 90 tablet, Rfl: 1  •  Zinc 50 MG capsule, Take  by mouth., Disp: , Rfl:   •  albuterol sulfate  (90 Base) MCG/ACT inhaler, Inhale 2 puffs Every 4 (Four) Hours As Needed for Wheezing., Disp: 9 g, Rfl: 1    Medications Discontinued During This Encounter   Medication Reason   • etodolac (LODINE) 400 MG tablet Reorder   • montelukast (Singulair) 10 MG tablet Reorder   • levothyroxine (SYNTHROID, LEVOTHROID) 200 MCG tablet Reorder   • levothyroxine (SYNTHROID, LEVOTHROID) 25 MCG tablet Reorder   • Diclofenac Sodium (Voltaren) 1 % gel gel Reorder   • docusate calcium (SURFAK) 240 MG capsule Reorder   • benzonatate (TESSALON) 100 MG capsule Reorder       Immunization History   Administered Date(s) Administered   • Influenza, Unspecified 10/01/2020       Review of Systems   Respiratory: Positive for cough.    Cardiovascular: Negative for chest pain and leg swelling.   Gastrointestinal: Positive for constipation. Negative for blood in stool, diarrhea,  nausea, vomiting and indigestion.        Objective       Vitals:    08/16/21 0935   BP: 139/83   Pulse: 79   Resp: 12   Temp: 98.4 °F (36.9 °C)   SpO2: 95%     Body mass index is 31.39 kg/m².         Physical Exam  Vitals reviewed.   Constitutional:       Appearance: Normal appearance. She is well-developed.   Neck:      Vascular: No carotid bruit.   Cardiovascular:      Rate and Rhythm: Normal rate and regular rhythm.      Heart sounds: Normal heart sounds. No murmur heard.     Pulmonary:      Effort: Pulmonary effort is normal.      Breath sounds: Normal breath sounds.   Neurological:      Mental Status: She is alert and oriented to person, place, and time.      Cranial Nerves: No cranial nerve deficit.      Motor: No weakness.   Psychiatric:         Mood and Affect: Mood and affect normal.             Result Review :     The following data was reviewed by: SHAILESH Colvin on 08/16/2021:                     Assessment and Plan      Diagnoses and all orders for this visit:    1. Acquired hypothyroidism (Primary)  Assessment & Plan:  She is doing well with her Synthroid 200 and her Synthroid 25.  Pending labs today.  Follow-up 6 months.    Orders:  -     levothyroxine (SYNTHROID, LEVOTHROID) 200 MCG tablet; Take 1 tablet by mouth Daily.  Dispense: 90 tablet; Refill: 1  -     levothyroxine (SYNTHROID, LEVOTHROID) 25 MCG tablet; Take 1 tablet by mouth Daily.  Dispense: 90 tablet; Refill: 1  -     Comprehensive Metabolic Panel  -     TSH  -     T4, Free    2. Low hemoglobin  -     Ferritin  -     Iron Profile  -     CBC & Differential    3. Seasonal allergic rhinitis, unspecified trigger  Assessment & Plan:  She is taking her Singulair on a daily basis.  She has been having this cough for couple of weeks it is a dry nagging cough.  She has been taking a prescription of Tessalon Perles and request a refill.  She is not coughing anything up.  She does not have a history of smoking.  She is not been around  cigarette smoke.  We will continue with the Tessalon and do an inhaler.  She is aware to rinse her mouth out after she uses 2 puffs.  She will keep me posted in the next week to 2 weeks.  If not better we will start with a chest x-ray.  I reviewed medication and there is not a medication that has a constant cough.  She declines a SARS or Covid test today.  She feels fine otherwise.    Orders:  -     montelukast (Singulair) 10 MG tablet; Take 1 tablet by mouth Every Night.  Dispense: 90 tablet; Refill: 1    4. Cough  -     montelukast (Singulair) 10 MG tablet; Take 1 tablet by mouth Every Night.  Dispense: 90 tablet; Refill: 1  -     benzonatate (TESSALON) 100 MG capsule; Take 1 capsule by mouth 3 (Three) Times a Day As Needed for Cough.  Dispense: 30 capsule; Refill: 2    5. Arthritis  Assessment & Plan:  She is doing the diclofenac cream 1-2 times a day as needed.  She is also taking her etodolac twice a day.  She is not having any stomach issues.  No darkened stools.  Pending labs today.  Follow-up in 6 months.    Orders:  -     Diclofenac Sodium (Voltaren) 1 % gel gel; Apply 4 g topically to the appropriate area as directed 4 (Four) Times a Day.  Dispense: 100 g; Refill: 2  -     etodolac (LODINE) 400 MG tablet; Take 1 tablet by mouth 2 (Two) Times a Day.  Dispense: 180 tablet; Refill: 1  -     Comprehensive Metabolic Panel    6. Anemia, unspecified type  Assessment & Plan:  She was on iron back in February.  Her labs did come back up in May and prior to hemorrhoid surgery the surgeon asked her to stop her iron 2 weeks prior.  She has not been on any iron supplements since then.  She is taking Dulcolax for constipation.  She feels that if she does not go to the bathroom every day than it is every other day but she will strain a little bit and she is worried that she will calls another hemorrhoid.  She requests that I refill medicine today.  Follow-up 6 months.  If there is any rectal bleeding let me know  immediately.    Orders:  -     Ferritin  -     Iron Profile  -     Comprehensive Metabolic Panel    7. Screening, lipid  -     Lipid Panel    8. Straining with stools  -     docusate calcium (SURFAK) 240 MG capsule; Take 1 capsule by mouth 2 (Two) Times a Day.  Dispense: 180 capsule; Refill: 1    Other orders  -     albuterol sulfate  (90 Base) MCG/ACT inhaler; Inhale 2 puffs Every 4 (Four) Hours As Needed for Wheezing.  Dispense: 9 g; Refill: 1          Follow Up     Return in about 6 months (around 2/16/2022).  Follow-up in 6 months for labs and appt. Call with any concerns or questions that you may have regarding your medications or history.  If hte cough lingers for another 2-3 weeks we will do CXR no hx of smoking.  I have reviewed all medications and at this time no medications changes need to be adjusted for all chronic conditions.  She will cont OTC vitamins.  We will inhaler as needed.  Rinse mouth out after each use.    Patient was given instructions and counseling regarding her condition or for health maintenance advice. Please see specific information pulled into the AVS if appropriate.   SHAILESH Colvin

## 2021-08-16 NOTE — ASSESSMENT & PLAN NOTE
She is doing the diclofenac cream 1-2 times a day as needed.  She is also taking her etodolac twice a day.  She is not having any stomach issues.  No darkened stools.  Pending labs today.  Follow-up in 6 months.

## 2021-08-16 NOTE — ASSESSMENT & PLAN NOTE
She is doing well with her Synthroid 200 and her Synthroid 25.  Pending labs today.  Follow-up 6 months.

## 2021-08-17 ENCOUNTER — TELEPHONE (OUTPATIENT)
Dept: FAMILY MEDICINE CLINIC | Facility: CLINIC | Age: 56
End: 2021-08-17

## 2021-08-17 DIAGNOSIS — M19.90 ARTHRITIS: ICD-10-CM

## 2021-08-17 RX ORDER — ETODOLAC 400 MG/1
TABLET, FILM COATED ORAL
Qty: 270 TABLET | Refills: 1 | Status: SHIPPED | OUTPATIENT
Start: 2021-08-17 | End: 2022-01-13

## 2021-08-17 NOTE — TELEPHONE ENCOUNTER
Caller: Amber Poole    Relationship: Self    Best call back number: [phone]     What is the best time to reach you: ANY    Who are you requesting to speak with (clinical staff, provider,  specific staff member): PROVIDER    What was the call regarding: PATIENT STATED THAT HER PCP SENT IN PRESCRIPTIONS FOR HER, BUT HER SCRIPT FOR etodolac (LODINE) 400 MG tablet WAS WRITTEN AS TWICE A DAY WHEN THE PATIENT TAKES IT 3 TIMES DAY AND BECAUSE OF THAT SHE DOES NOT HAVE THE 90 DAY SUPPLY SHE REQUESTED.      PATIENT STATED SHE WAS TOLD SHE COULD USE THE Diclofenac Sodium (Voltaren) 1 % gel gel 4 TIMES A DAY AND REQUESTED A 90 DAY SUPPLY, BUT SHE WAS ONLY GIVEN 1 TUBE.    PLEASE CALL BACK TO DISCUSS IF WE CAN FIX THESE FOR THE PATIENT        Do you require a callback: YES

## 2021-08-17 NOTE — TELEPHONE ENCOUNTER
I sent new rx for 3 a day of the etodolac but the voltaren gel is 100gm which should last 2-3 months due to the amt she places on the area's.

## 2021-10-20 ENCOUNTER — OFFICE VISIT (OUTPATIENT)
Dept: FAMILY MEDICINE CLINIC | Facility: CLINIC | Age: 56
End: 2021-10-20

## 2021-10-20 VITALS
SYSTOLIC BLOOD PRESSURE: 146 MMHG | BODY MASS INDEX: 32.54 KG/M2 | DIASTOLIC BLOOD PRESSURE: 82 MMHG | OXYGEN SATURATION: 95 % | HEART RATE: 80 BPM | WEIGHT: 207.3 LBS | TEMPERATURE: 97.5 F | HEIGHT: 67 IN

## 2021-10-20 DIAGNOSIS — R03.0 ELEVATED BLOOD PRESSURE READING: ICD-10-CM

## 2021-10-20 DIAGNOSIS — R23.2 HOT FLASHES: Primary | ICD-10-CM

## 2021-10-20 PROCEDURE — 99213 OFFICE O/P EST LOW 20 MIN: CPT | Performed by: NURSE PRACTITIONER

## 2021-10-20 RX ORDER — ESTRADIOL 0.1 MG/G
CREAM VAGINAL
COMMUNITY
Start: 2021-09-18 | End: 2022-03-02

## 2021-10-20 RX ORDER — FERROUS SULFATE 325(65) MG
325 TABLET ORAL EVERY OTHER DAY
COMMUNITY
End: 2022-09-13 | Stop reason: SDUPTHER

## 2021-10-20 RX ORDER — LIDOCAINE 50 MG/G
OINTMENT TOPICAL
COMMUNITY
Start: 2021-05-25 | End: 2022-01-13

## 2021-10-20 RX ORDER — ESCITALOPRAM OXALATE 10 MG/1
10 TABLET ORAL DAILY
Qty: 30 TABLET | Refills: 1 | Status: SHIPPED | OUTPATIENT
Start: 2021-10-20 | End: 2022-03-02 | Stop reason: SDUPTHER

## 2021-10-20 RX ORDER — ASCORBIC ACID 500 MG
TABLET ORAL
COMMUNITY

## 2021-10-20 NOTE — PROGRESS NOTES
Answers for HPI/ROS submitted by the patient on 10/13/2021  Please describe your symptoms.: Hot flashes,  sweats, sleeplessness  Have you had these symptoms before?: No  How long have you been having these symptoms?: Greater than 2 weeks  Please describe any probable cause for these symptoms. : Menopause  What is the primary reason for your visit?: Other    Chief Complaint  Hypertension and Hot Flashes    Subjective          Amber Poole presents to [facility]  History of Present Illness  She has been seeing Sudha at Dr Shaw--she has had u/s of the cervix--she wanted to recheck again and goes in Dec.  The hot flashes started about 1 month.  She will be sitting on the bus, at home and even at dinner and she will start getting hot flash that will last 2-3 mins and she will then be ok.  She is still using the AC at home even at night.  She will cover up and then covers off all night.  She has not been sleeping well with her fit bit. She has been hot and cold.  She doesn't know if her BP is elevated due to the hot flashes or vis versa.    She does use the estrogen cream as need but will use the kenalog.  She will using the med for the irritations of the vaginal irritation.    Her home testing is:134/82, 169/93 and then after the hot flash it was 126/81, 145/90, 146/83, 146/82          Past Medical History:   • Allergic   • Allergies   • Arthritis   • Blood clotting disorder (HCC)   • Chronic allergic rhinitis   • Hypercoagulable state (HCC)   • Hypothyroidism   • Seasonal allergies   • Thyroid disorder   • Torn meniscus    LEFT KNEE       Allergies  Penicillins    Past Surgical History:   • COLONOSCOPY   • KNEE CARTILAGE SURGERY    BEEN 08.2020   • LASER ABLATION    UTERINE, [place]   • TONSILLECTOMY   • TUBAL ABDOMINAL LIGATION       Social History     Tobacco Use   • Smoking status: Never Smoker   • Smokeless tobacco: Never Used   Substance Use Topics   • Alcohol use: Never   •  Drug use: Never       Family History   Problem Relation Age of Onset   • Diabetes Mother    • Kidney nephrosis Mother    • Heart disease Father    • Vision loss Father    • Kidney nephrosis Maternal Grandmother    • Other Maternal Grandmother         BLADDER CALCULUS   • Liver cancer Other         UNSPECIFIED   • Lung cancer Other         UNSPECIFIED   • Cancer Daughter    • Leukemia Daughter    • Leukemia Son         Health Maintenance Due   Topic Date Due   • COLORECTAL CANCER SCREENING  Never done   • ANNUAL PHYSICAL  Never done   • COVID-19 Vaccine (1) Never done   • TDAP/TD VACCINES (1 - Tdap) Never done   • ZOSTER VACCINE (1 of 2) Never done   • HEPATITIS C SCREENING  Never done   • PAP SMEAR  Never done   • INFLUENZA VACCINE  08/01/2021          Current Outpatient Medications:   •  albuterol sulfate  (90 Base) MCG/ACT inhaler, Inhale 2 puffs Every 4 (Four) Hours As Needed for Wheezing., Disp: 9 g, Rfl: 1  •  ascorbic acid (VITAMIN C) 500 MG tablet, Vitamin C 500 mg oral tablet take 1 tablet by oral route daily   Active, Disp: , Rfl:   •  aspirin (aspirin) 81 MG EC tablet, , Disp: , Rfl:   •  benzonatate (TESSALON) 100 MG capsule, Take 1 capsule by mouth 3 (Three) Times a Day As Needed for Cough., Disp: 30 capsule, Rfl: 2  •  Cholecalciferol (Vitamin D3) 50 MCG (2000 UT) capsule, Take 2,000 Units by mouth Daily., Disp: , Rfl:   •  Diclofenac Sodium (Voltaren) 1 % gel gel, Apply 4 g topically to the appropriate area as directed 4 (Four) Times a Day., Disp: 100 g, Rfl: 2  •  docusate calcium (SURFAK) 240 MG capsule, Take 1 capsule by mouth 2 (Two) Times a Day., Disp: 180 capsule, Rfl: 1  •  estradiol (ESTRACE) 0.1 MG/GM vaginal cream, APPLY 2 grams EXTERNALLY TO VAGINA AT BEDTIME, Disp: , Rfl:   •  etodolac (LODINE) 400 MG tablet, 1 tab po tid, Disp: 270 tablet, Rfl: 1  •  ferrous sulfate 325 (65 FE) MG tablet, Take 325 mg by mouth Daily With Breakfast., Disp: , Rfl:   •  levothyroxine (SYNTHROID,  LEVOTHROID) 200 MCG tablet, Take 1 tablet by mouth Daily., Disp: 90 tablet, Rfl: 1  •  levothyroxine (SYNTHROID, LEVOTHROID) 25 MCG tablet, Take 1 tablet by mouth Daily., Disp: 90 tablet, Rfl: 1  •  lidocaine (XYLOCAINE) 5 % ointment, lidocaine 5 % topical ointment apply to affected area(s) by topical route 1-4 times daily as needed 5/25/2021  Active, Disp: , Rfl:   •  loratadine (CLARITIN) 10 MG tablet, loratadine 10 mg oral tablet take 1 tablet (10 mg) by oral route once daily   Suspended, Disp: , Rfl:   •  melatonin 1 MG tablet, Take  by mouth., Disp: , Rfl:   •  montelukast (Singulair) 10 MG tablet, Take 1 tablet by mouth Every Night., Disp: 90 tablet, Rfl: 1  •  triamcinolone (KENALOG) 0.1 % ointment, APPLY 2-3 TIMES DAILY TO THE AFFECTED AREA, Disp: , Rfl:   •  Zinc 50 MG capsule, Take  by mouth., Disp: , Rfl:   •  escitalopram (Lexapro) 10 MG tablet, Take 1 tablet by mouth Daily., Disp: 30 tablet, Rfl: 1    There are no discontinued medications.    Immunization History   Administered Date(s) Administered   • Influenza, Unspecified 10/01/2020       Review of Systems   Neurological: Negative for dizziness.     Hot flashes  Objective       Vitals:    10/20/21 1022   BP: 146/82   Pulse: 80   Temp: 97.5 °F (36.4 °C)   SpO2: 95%     Body mass index is 32.47 kg/m².         Physical Exam  Vitals reviewed.   Constitutional:       Appearance: Normal appearance. She is well-developed.   Cardiovascular:      Rate and Rhythm: Normal rate and regular rhythm.      Heart sounds: Normal heart sounds. No murmur heard.      Pulmonary:      Effort: Pulmonary effort is normal.      Breath sounds: Normal breath sounds.   Neurological:      Mental Status: She is alert and oriented to person, place, and time.      Cranial Nerves: No cranial nerve deficit.      Motor: No weakness.   Psychiatric:         Mood and Affect: Mood and affect normal.             Result Review :     The following data was reviewed by: Faith Ramos,  SHAILESH on 10/20/2021:                     Assessment and Plan      Diagnoses and all orders for this visit:    1. Hot flashes (Primary)  -     escitalopram (Lexapro) 10 MG tablet; Take 1 tablet by mouth Daily.  Dispense: 30 tablet; Refill: 1    2. Elevated blood pressure reading  -     escitalopram (Lexapro) 10 MG tablet; Take 1 tablet by mouth Daily.  Dispense: 30 tablet; Refill: 1            Follow Up     Return in about 1 month (around 11/20/2021).  We will start low dose lexapro to help with the hot flashes which is what I feels is causing the elevated bp more than true hypertension.  F.U in 1 month to see how the hot flashes are doing and see how the BP is doing.  Check BP 2 times a day for 1 month.  She has have an appt in Dec for pelvic exam with Dr Shaw.  Patient was given instructions and counseling regarding her condition or for health maintenance advice. Please see specific information pulled into the AVS if appropriate.   SHAILESH Colvin

## 2021-11-11 ENCOUNTER — OFFICE VISIT (OUTPATIENT)
Dept: FAMILY MEDICINE CLINIC | Facility: CLINIC | Age: 56
End: 2021-11-11

## 2021-11-11 VITALS
OXYGEN SATURATION: 98 % | TEMPERATURE: 97.9 F | DIASTOLIC BLOOD PRESSURE: 84 MMHG | HEIGHT: 67 IN | WEIGHT: 207.7 LBS | RESPIRATION RATE: 18 BRPM | SYSTOLIC BLOOD PRESSURE: 136 MMHG | BODY MASS INDEX: 32.6 KG/M2 | HEART RATE: 75 BPM

## 2021-11-11 DIAGNOSIS — H92.01 RIGHT EAR PAIN: ICD-10-CM

## 2021-11-11 DIAGNOSIS — M25.59 PAIN IN OTHER JOINT: Primary | ICD-10-CM

## 2021-11-11 DIAGNOSIS — D64.9 ANEMIA, UNSPECIFIED TYPE: ICD-10-CM

## 2021-11-11 LAB
BASOPHILS # BLD AUTO: 0.05 10*3/MM3 (ref 0–0.2)
BASOPHILS NFR BLD AUTO: 0.8 % (ref 0–1.5)
CHROMATIN AB SERPL-ACNC: <10 IU/ML (ref 0–14)
CK SERPL-CCNC: 144 U/L (ref 20–180)
CRP SERPL-MCNC: 0.82 MG/DL (ref 0–0.5)
DEPRECATED RDW RBC AUTO: 44.6 FL (ref 37–54)
EOSINOPHIL # BLD AUTO: 0.42 10*3/MM3 (ref 0–0.4)
EOSINOPHIL NFR BLD AUTO: 6.5 % (ref 0.3–6.2)
ERYTHROCYTE [DISTWIDTH] IN BLOOD BY AUTOMATED COUNT: 13.9 % (ref 12.3–15.4)
ERYTHROCYTE [SEDIMENTATION RATE] IN BLOOD: 11 MM/HR (ref 0–30)
FERRITIN SERPL-MCNC: 98.9 NG/ML (ref 13–150)
HCT VFR BLD AUTO: 37.9 % (ref 34–46.6)
HGB BLD-MCNC: 13 G/DL (ref 12–15.9)
IMM GRANULOCYTES # BLD AUTO: 0.02 10*3/MM3 (ref 0–0.05)
IMM GRANULOCYTES NFR BLD AUTO: 0.3 % (ref 0–0.5)
IRON 24H UR-MRATE: 90 MCG/DL (ref 37–145)
IRON SATN MFR SERPL: 28 % (ref 20–50)
LYMPHOCYTES # BLD AUTO: 1.32 10*3/MM3 (ref 0.7–3.1)
LYMPHOCYTES NFR BLD AUTO: 20.4 % (ref 19.6–45.3)
MCH RBC QN AUTO: 30 PG (ref 26.6–33)
MCHC RBC AUTO-ENTMCNC: 34.3 G/DL (ref 31.5–35.7)
MCV RBC AUTO: 87.5 FL (ref 79–97)
MONOCYTES # BLD AUTO: 0.56 10*3/MM3 (ref 0.1–0.9)
MONOCYTES NFR BLD AUTO: 8.7 % (ref 5–12)
NEUTROPHILS NFR BLD AUTO: 4.1 10*3/MM3 (ref 1.7–7)
NEUTROPHILS NFR BLD AUTO: 63.3 % (ref 42.7–76)
NRBC BLD AUTO-RTO: 0.2 /100 WBC (ref 0–0.2)
PLATELET # BLD AUTO: 253 10*3/MM3 (ref 140–450)
PMV BLD AUTO: 11.6 FL (ref 6–12)
RBC # BLD AUTO: 4.33 10*6/MM3 (ref 3.77–5.28)
TIBC SERPL-MCNC: 317 MCG/DL (ref 298–536)
TRANSFERRIN SERPL-MCNC: 213 MG/DL (ref 200–360)
WBC # BLD AUTO: 6.47 10*3/MM3 (ref 3.4–10.8)

## 2021-11-11 PROCEDURE — 85025 COMPLETE CBC W/AUTO DIFF WBC: CPT | Performed by: NURSE PRACTITIONER

## 2021-11-11 PROCEDURE — 86200 CCP ANTIBODY: CPT | Performed by: NURSE PRACTITIONER

## 2021-11-11 PROCEDURE — 86038 ANTINUCLEAR ANTIBODIES: CPT | Performed by: NURSE PRACTITIONER

## 2021-11-11 PROCEDURE — 84466 ASSAY OF TRANSFERRIN: CPT | Performed by: NURSE PRACTITIONER

## 2021-11-11 PROCEDURE — 99213 OFFICE O/P EST LOW 20 MIN: CPT | Performed by: NURSE PRACTITIONER

## 2021-11-11 PROCEDURE — 82550 ASSAY OF CK (CPK): CPT | Performed by: NURSE PRACTITIONER

## 2021-11-11 PROCEDURE — 82728 ASSAY OF FERRITIN: CPT | Performed by: NURSE PRACTITIONER

## 2021-11-11 PROCEDURE — 83540 ASSAY OF IRON: CPT | Performed by: NURSE PRACTITIONER

## 2021-11-11 PROCEDURE — 86140 C-REACTIVE PROTEIN: CPT | Performed by: NURSE PRACTITIONER

## 2021-11-11 PROCEDURE — 86431 RHEUMATOID FACTOR QUANT: CPT | Performed by: NURSE PRACTITIONER

## 2021-11-11 PROCEDURE — 85652 RBC SED RATE AUTOMATED: CPT | Performed by: NURSE PRACTITIONER

## 2021-11-11 NOTE — PROGRESS NOTES
Chief Complaint  Numbness (left index fingers and 3-4 fingers ), Earache (left ear), Temporomandibular Joint Pain, and Pain (joint pain)    Subjective          Amber Poole presents to [facility]  History of Present Illness  She has been having swelling in the right hand and having some numbness/cold and the arthritis is aching more even in the knee's.  The left hand is aching.  She can't make a fist in the left hand.  She can't twist the lid to a coke zero.  This started about 1 week or so and it started on the right hand but then it started to get in the left hand now.  If she starts to move in the am she can start to use it more.    She is having right ear pain and feels funny.  She can't eat on the right side on the at times.  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  She is taking the etodolac and it has not been helping as much.  If she sit down it will ache.          Past Medical History:   • Allergic   • Allergies   • Arthritis   • Blood clotting disorder (HCC)   • Chronic allergic rhinitis   • Hypercoagulable state (HCC)   • Hypothyroidism   • Seasonal allergies   • Thyroid disorder   • Torn meniscus    LEFT KNEE       Allergies  Penicillins    Past Surgical History:   • COLONOSCOPY   • KNEE CARTILAGE SURGERY    BEEN 08.2020   • LASER ABLATION    UTERINE, NETHERRS   • TONSILLECTOMY   • TUBAL ABDOMINAL LIGATION       Social History     Tobacco Use   • Smoking status: Never Smoker   • Smokeless tobacco: Never Used   Substance Use Topics   • Alcohol use: Never   • Drug use: Never       Family History   Problem Relation Age of Onset   • Diabetes Mother    • Kidney nephrosis Mother    • Heart disease Father    • Vision loss Father    • Kidney nephrosis Maternal Grandmother    • Other Maternal Grandmother         BLADDER CALCULUS   • Liver cancer Other         UNSPECIFIED   • Lung cancer Other         UNSPECIFIED   • Cancer Daughter    • Leukemia Daughter    • Leukemia Son          Health Maintenance Due   Topic Date Due   • COLORECTAL CANCER SCREENING  Never done   • ANNUAL PHYSICAL  Never done   • COVID-19 Vaccine (1) Never done   • TDAP/TD VACCINES (1 - Tdap) Never done   • ZOSTER VACCINE (1 of 2) Never done   • HEPATITIS C SCREENING  Never done   • PAP SMEAR  Never done   • INFLUENZA VACCINE  08/01/2021          Current Outpatient Medications:   •  albuterol sulfate  (90 Base) MCG/ACT inhaler, Inhale 2 puffs Every 4 (Four) Hours As Needed for Wheezing., Disp: 9 g, Rfl: 1  •  ascorbic acid (VITAMIN C) 500 MG tablet, Vitamin C 500 mg oral tablet take 1 tablet by oral route daily   Active, Disp: , Rfl:   •  aspirin (aspirin) 81 MG EC tablet, , Disp: , Rfl:   •  benzonatate (TESSALON) 100 MG capsule, Take 1 capsule by mouth 3 (Three) Times a Day As Needed for Cough., Disp: 30 capsule, Rfl: 2  •  Cholecalciferol (Vitamin D3) 50 MCG (2000 UT) capsule, Take 2,000 Units by mouth Daily., Disp: , Rfl:   •  Diclofenac Sodium (Voltaren) 1 % gel gel, Apply 4 g topically to the appropriate area as directed 4 (Four) Times a Day., Disp: 100 g, Rfl: 2  •  docusate calcium (SURFAK) 240 MG capsule, Take 1 capsule by mouth 2 (Two) Times a Day., Disp: 180 capsule, Rfl: 1  •  escitalopram (Lexapro) 10 MG tablet, Take 1 tablet by mouth Daily., Disp: 30 tablet, Rfl: 1  •  estradiol (ESTRACE) 0.1 MG/GM vaginal cream, APPLY 2 grams EXTERNALLY TO VAGINA AT BEDTIME, Disp: , Rfl:   •  etodolac (LODINE) 400 MG tablet, 1 tab po tid, Disp: 270 tablet, Rfl: 1  •  ferrous sulfate 325 (65 FE) MG tablet, Take 325 mg by mouth Daily With Breakfast., Disp: , Rfl:   •  levothyroxine (SYNTHROID, LEVOTHROID) 200 MCG tablet, Take 1 tablet by mouth Daily., Disp: 90 tablet, Rfl: 1  •  levothyroxine (SYNTHROID, LEVOTHROID) 25 MCG tablet, Take 1 tablet by mouth Daily., Disp: 90 tablet, Rfl: 1  •  lidocaine (XYLOCAINE) 5 % ointment, lidocaine 5 % topical ointment apply to affected area(s) by topical route 1-4 times daily as  needed 5/25/2021  Active, Disp: , Rfl:   •  loratadine (CLARITIN) 10 MG tablet, loratadine 10 mg oral tablet take 1 tablet (10 mg) by oral route once daily   Suspended, Disp: , Rfl:   •  melatonin 1 MG tablet, Take  by mouth., Disp: , Rfl:   •  montelukast (Singulair) 10 MG tablet, Take 1 tablet by mouth Every Night., Disp: 90 tablet, Rfl: 1  •  triamcinolone (KENALOG) 0.1 % ointment, APPLY 2-3 TIMES DAILY TO THE AFFECTED AREA, Disp: , Rfl:   •  Zinc 50 MG capsule, Take  by mouth., Disp: , Rfl:     There are no discontinued medications.    Immunization History   Administered Date(s) Administered   • Influenza, Unspecified 10/01/2020       Review of Systems     Objective       Vitals:    11/11/21 0815   BP: 136/84   Pulse: 75   Resp: 18   Temp: 97.9 °F (36.6 °C)   SpO2: 98%     Body mass index is 32.53 kg/m².         Physical Exam  Vitals reviewed.   Constitutional:       Appearance: Normal appearance. She is well-developed.   HENT:      Right Ear: Tympanic membrane and ear canal normal.      Left Ear: Tympanic membrane and ear canal normal.      Ears:      Comments: Bilateral TMJ is very tight and patient said tender to touch.  Cardiovascular:      Rate and Rhythm: Normal rate and regular rhythm.      Heart sounds: Normal heart sounds. No murmur heard.      Pulmonary:      Effort: Pulmonary effort is normal.      Breath sounds: Normal breath sounds.   Neurological:      Mental Status: She is alert and oriented to person, place, and time.      Cranial Nerves: No cranial nerve deficit.      Motor: No weakness.   Psychiatric:         Mood and Affect: Mood and affect normal.             Result Review :     The following data was reviewed by: SHAILESH Colvin on 11/11/2021:    Common labs    Common Labsle 2/23/21 4/21/21 8/16/21 8/16/21 8/16/21      1009 1009 1009   Glucose 81 96  89    BUN 13 22  10    Creatinine 0.70 0.77  0.69    eGFR Non African Am    89    Sodium 140 139  144    Potassium 4.2 4.1  4.6     Chloride 107 106  108 (A)    Calcium 9.0 9.1  9.1    Albumin 4.1 3.9  4.00    Total Bilirubin 0.23 0.21  0.4    Alkaline Phosphatase 76 79  122 (A)    AST (SGOT) 21 22  42 (A)    ALT (SGPT) 16 20  32    WBC 7.96 7.28 5.30     Hemoglobin 9.5 (A) 12.9 13.5     Hematocrit 32.7 (A) 42.1 41.0     Platelets 385 294 244     Total Cholesterol     137   Total Cholesterol 187 193      Triglycerides 111 138   110   HDL Cholesterol 40 41   36 (A)   LDL Cholesterol  125 (A) 124 (A)   81   (A) Abnormal value       Comments are available for some flowsheets but are not being displayed.           CBC    CBC 2/23/21 4/21/21 8/16/21   WBC 7.96 7.28 5.30   RBC 4.51 5.08 4.82   Hemoglobin 9.5 (A) 12.9 13.5   Hematocrit 32.7 (A) 42.1 41.0   MCV 72.5 (A) 82.9 85.1   MCH 21.1 (A) 25.4 (A) 28.0   MCHC 29.1 (A) 30.6 (A) 32.9   RDW 17.7 (A) 21.9 (A) 13.7   Platelets 385 294 244   (A) Abnormal value                           Assessment and Plan      Diagnoses and all orders for this visit:    1. Pain in other joint (Primary)  -     C-reactive protein  -     ROE Direct Reflex to 11 Biomarker  -     Cyclic citrul peptide antibody, IgG/IgA  -     CK  -     Rheumatoid factor  -     Sedimentation rate    2. Anemia, unspecified type  -     Ferritin  -     Iron Profile  -     CBC & Differential    3. Right ear pain            Follow Up     Return for 11/22.  We will check labs today and I would like you to get the braces for carpel tunnel at [facility]--we will f.u on 11/22 to see where we need to go from there.   Patient was given instructions and counseling regarding her condition or for health maintenance advice. Please see specific information pulled into the AVS if appropriate.   SHAILESH Colvin

## 2021-11-12 ENCOUNTER — TELEPHONE (OUTPATIENT)
Dept: FAMILY MEDICINE CLINIC | Facility: CLINIC | Age: 56
End: 2021-11-12

## 2021-11-12 DIAGNOSIS — M19.90 ARTHRITIS: ICD-10-CM

## 2021-11-12 DIAGNOSIS — M25.59 PAIN IN OTHER JOINT: Primary | ICD-10-CM

## 2021-11-12 LAB — ANA SER QL: NEGATIVE

## 2021-11-13 LAB — CCP IGA+IGG SERPL IA-ACNC: 5 UNITS (ref 0–19)

## 2021-11-22 ENCOUNTER — OFFICE VISIT (OUTPATIENT)
Dept: FAMILY MEDICINE CLINIC | Facility: CLINIC | Age: 56
End: 2021-11-22

## 2021-11-22 VITALS
DIASTOLIC BLOOD PRESSURE: 76 MMHG | OXYGEN SATURATION: 98 % | BODY MASS INDEX: 32.3 KG/M2 | SYSTOLIC BLOOD PRESSURE: 136 MMHG | TEMPERATURE: 97.9 F | RESPIRATION RATE: 18 BRPM | HEIGHT: 67 IN | WEIGHT: 205.8 LBS | HEART RATE: 80 BPM

## 2021-11-22 DIAGNOSIS — M79.642 PAIN IN BOTH HANDS: ICD-10-CM

## 2021-11-22 DIAGNOSIS — M79.641 PAIN IN BOTH HANDS: ICD-10-CM

## 2021-11-22 DIAGNOSIS — D64.9 ANEMIA, UNSPECIFIED TYPE: ICD-10-CM

## 2021-11-22 DIAGNOSIS — M54.10 RADICULAR PAIN: ICD-10-CM

## 2021-11-22 DIAGNOSIS — M19.90 ARTHRITIS: Primary | ICD-10-CM

## 2021-11-22 PROCEDURE — 99213 OFFICE O/P EST LOW 20 MIN: CPT | Performed by: NURSE PRACTITIONER

## 2021-11-22 RX ORDER — PREDNISONE 20 MG/1
TABLET ORAL
Qty: 15 TABLET | Refills: 0 | Status: SHIPPED | OUTPATIENT
Start: 2021-11-22 | End: 2021-12-20

## 2021-11-22 NOTE — PROGRESS NOTES
Answers for HPI/ROS submitted by the patient on 11/15/2021  Please describe your symptoms.: Pain and tightness in hands  Have you had these symptoms before?: No  How long have you been having these symptoms?: Greater than 2 weeks  What is the primary reason for your visit?: Other    Chief Complaint  Follow-up (1 month follow ) and Arthritis    Subjective          Amber Poole presents to [facility]  History of Present Illness  She has been washing dishes to help her hands feels better.  She will have stiffness in the hands in the Am but by the evening the hands are some better.  She has been wearing the braces but then she started to have some weakness in the day.  If she soaks in hot water with her hands it gets some better.  She is rubbing the voltaren gel on the joints.  She is having issues with the hands (3 fingers).  It does help and even with the knee.    She see's RA on Dec 22nd.        Past Medical History:   • Allergic   • Allergies   • Arthritis   • Blood clotting disorder (HCC)   • Chronic allergic rhinitis   • Hypercoagulable state (HCC)   • Hypothyroidism   • Seasonal allergies   • Thyroid disorder   • Torn meniscus    LEFT KNEE       Allergies  Penicillins    Past Surgical History:   • COLONOSCOPY   • KNEE CARTILAGE SURGERY    BEEN 08.2020   • LASER ABLATION    UTERINE, NETHERRS   • TONSILLECTOMY   • TUBAL ABDOMINAL LIGATION       Social History     Tobacco Use   • Smoking status: Never Smoker   • Smokeless tobacco: Never Used   Substance Use Topics   • Alcohol use: Never   • Drug use: Never       Family History   Problem Relation Age of Onset   • Diabetes Mother    • Kidney nephrosis Mother    • Heart disease Father    • Vision loss Father    • Kidney nephrosis Maternal Grandmother    • Other Maternal Grandmother         BLADDER CALCULUS   • Liver cancer Other         UNSPECIFIED   • Lung cancer Other         UNSPECIFIED   • Cancer Daughter    • Leukemia Daughter     • Leukemia Son         Health Maintenance Due   Topic Date Due   • COLORECTAL CANCER SCREENING  Never done   • ANNUAL PHYSICAL  Never done   • COVID-19 Vaccine (1) Never done   • TDAP/TD VACCINES (1 - Tdap) Never done   • ZOSTER VACCINE (1 of 2) Never done   • HEPATITIS C SCREENING  Never done   • PAP SMEAR  Never done   • INFLUENZA VACCINE  08/01/2021          Current Outpatient Medications:   •  albuterol sulfate  (90 Base) MCG/ACT inhaler, Inhale 2 puffs Every 4 (Four) Hours As Needed for Wheezing., Disp: 9 g, Rfl: 1  •  ascorbic acid (VITAMIN C) 500 MG tablet, Vitamin C 500 mg oral tablet take 1 tablet by oral route daily   Active, Disp: , Rfl:   •  aspirin (aspirin) 81 MG EC tablet, , Disp: , Rfl:   •  Cholecalciferol (Vitamin D3) 50 MCG (2000 UT) capsule, Take 2,000 Units by mouth Daily., Disp: , Rfl:   •  Diclofenac Sodium (Voltaren) 1 % gel gel, Apply 4 g topically to the appropriate area as directed 4 (Four) Times a Day., Disp: 100 g, Rfl: 2  •  docusate calcium (SURFAK) 240 MG capsule, Take 1 capsule by mouth 2 (Two) Times a Day., Disp: 180 capsule, Rfl: 1  •  escitalopram (Lexapro) 10 MG tablet, Take 1 tablet by mouth Daily., Disp: 30 tablet, Rfl: 1  •  estradiol (ESTRACE) 0.1 MG/GM vaginal cream, APPLY 2 grams EXTERNALLY TO VAGINA AT BEDTIME, Disp: , Rfl:   •  etodolac (LODINE) 400 MG tablet, 1 tab po tid, Disp: 270 tablet, Rfl: 1  •  ferrous sulfate 325 (65 FE) MG tablet, Take 325 mg by mouth Daily With Breakfast., Disp: , Rfl:   •  levothyroxine (SYNTHROID, LEVOTHROID) 200 MCG tablet, Take 1 tablet by mouth Daily., Disp: 90 tablet, Rfl: 1  •  levothyroxine (SYNTHROID, LEVOTHROID) 25 MCG tablet, Take 1 tablet by mouth Daily., Disp: 90 tablet, Rfl: 1  •  lidocaine (XYLOCAINE) 5 % ointment, lidocaine 5 % topical ointment apply to affected area(s) by topical route 1-4 times daily as needed 5/25/2021  Active, Disp: , Rfl:   •  loratadine (CLARITIN) 10 MG tablet, loratadine 10 mg oral tablet take 1  tablet (10 mg) by oral route once daily   Suspended, Disp: , Rfl:   •  melatonin 1 MG tablet, Take  by mouth., Disp: , Rfl:   •  montelukast (Singulair) 10 MG tablet, Take 1 tablet by mouth Every Night., Disp: 90 tablet, Rfl: 1  •  triamcinolone (KENALOG) 0.1 % ointment, APPLY 2-3 TIMES DAILY TO THE AFFECTED AREA, Disp: , Rfl:   •  Zinc 50 MG capsule, Take  by mouth., Disp: , Rfl:   •  predniSONE (DELTASONE) 20 MG tablet, 1 tab twice a day for 5 days then 1 tab daily for 5 days, Disp: 15 tablet, Rfl: 0    Medications Discontinued During This Encounter   Medication Reason   • benzonatate (TESSALON) 100 MG capsule *Therapy completed       Immunization History   Administered Date(s) Administered   • Influenza, Unspecified 10/01/2020       Review of Systems     Objective       Vitals:    11/22/21 1109   BP: 136/76   Pulse: 80   Resp: 18   Temp: 97.9 °F (36.6 °C)   SpO2: 98%     Body mass index is 32.23 kg/m².         Physical Exam  Constitutional:       Appearance: Normal appearance.   HENT:      Head: Normocephalic.   Pulmonary:      Effort: Pulmonary effort is normal.   Skin:     Findings: No bruising.   Neurological:      General: No focal deficit present.      Mental Status: She is alert and oriented to person, place, and time.   Psychiatric:         Mood and Affect: Mood normal.         Behavior: Behavior normal.         Thought Content: Thought content normal.         Judgment: Judgment normal.             Result Review :     The following data was reviewed by: SHAILESH Colvin on 11/22/2021:    Common labs    Common Labsle 4/21/21 8/16/21 8/16/21 8/16/21 11/11/21     1009 1009 1009    Glucose 96  89     BUN 22  10     Creatinine 0.77  0.69     eGFR Non African Am   89     Sodium 139  144     Potassium 4.1  4.6     Chloride 106  108 (A)     Calcium 9.1  9.1     Albumin 3.9  4.00     Total Bilirubin 0.21  0.4     Alkaline Phosphatase 79  122 (A)     AST (SGOT) 22  42 (A)     ALT (SGPT) 20  32     WBC 7.28  5.30   6.47   Hemoglobin 12.9 13.5   13.0   Hematocrit 42.1 41.0   37.9   Platelets 294 244   253   Total Cholesterol    137    Total Cholesterol 193       Triglycerides 138   110    HDL Cholesterol 41   36 (A)    LDL Cholesterol  124 (A)   81    (A) Abnormal value       Comments are available for some flowsheets but are not being displayed.                          Assessment and Plan      Diagnoses and all orders for this visit:    1. Arthritis (Primary)  -     predniSONE (DELTASONE) 20 MG tablet; 1 tab twice a day for 5 days then 1 tab daily for 5 days  Dispense: 15 tablet; Refill: 0    2. Pain in both hands  -     predniSONE (DELTASONE) 20 MG tablet; 1 tab twice a day for 5 days then 1 tab daily for 5 days  Dispense: 15 tablet; Refill: 0    3. Radicular pain  -     predniSONE (DELTASONE) 20 MG tablet; 1 tab twice a day for 5 days then 1 tab daily for 5 days  Dispense: 15 tablet; Refill: 0    4. Anemia, unspecified type            Follow Up     Return if symptoms worsen or fail to improve.  We will hold ortho right now and see what the RA says on 12/22 but in the process we will start 10 days of prednisone to get some relief of the joint.  Call me on Monday and let me how her hands are feeling.    We will go to 1 a day on the iron and recheck in Feb 8th with reg appt..  Patient was given instructions and counseling regarding her condition or for health maintenance advice. Please see specific information pulled into the AVS if appropriate.   SHAILESH Colvin

## 2021-11-23 ENCOUNTER — PATIENT MESSAGE (OUTPATIENT)
Dept: FAMILY MEDICINE CLINIC | Facility: CLINIC | Age: 56
End: 2021-11-23

## 2021-11-23 DIAGNOSIS — M19.90 ARTHRITIS: ICD-10-CM

## 2021-12-20 ENCOUNTER — OFFICE VISIT (OUTPATIENT)
Dept: FAMILY MEDICINE CLINIC | Facility: CLINIC | Age: 56
End: 2021-12-20

## 2021-12-20 VITALS
HEIGHT: 67 IN | SYSTOLIC BLOOD PRESSURE: 130 MMHG | BODY MASS INDEX: 32.63 KG/M2 | WEIGHT: 207.9 LBS | RESPIRATION RATE: 18 BRPM | HEART RATE: 79 BPM | DIASTOLIC BLOOD PRESSURE: 84 MMHG | TEMPERATURE: 97.5 F | OXYGEN SATURATION: 98 %

## 2021-12-20 DIAGNOSIS — H10.33 ACUTE CONJUNCTIVITIS OF BOTH EYES, UNSPECIFIED ACUTE CONJUNCTIVITIS TYPE: Primary | ICD-10-CM

## 2021-12-20 PROCEDURE — 99213 OFFICE O/P EST LOW 20 MIN: CPT | Performed by: NURSE PRACTITIONER

## 2021-12-20 RX ORDER — OLOPATADINE HYDROCHLORIDE 2 MG/ML
1 SOLUTION/ DROPS OPHTHALMIC DAILY
Qty: 2.5 ML | Refills: 0 | Status: SHIPPED | OUTPATIENT
Start: 2021-12-20 | End: 2022-01-13

## 2021-12-21 ENCOUNTER — TELEPHONE (OUTPATIENT)
Dept: FAMILY MEDICINE CLINIC | Facility: CLINIC | Age: 56
End: 2021-12-21

## 2022-01-13 ENCOUNTER — OFFICE VISIT (OUTPATIENT)
Dept: FAMILY MEDICINE CLINIC | Facility: CLINIC | Age: 57
End: 2022-01-13

## 2022-01-13 ENCOUNTER — TRANSCRIBE ORDERS (OUTPATIENT)
Dept: ADMINISTRATIVE | Facility: HOSPITAL | Age: 57
End: 2022-01-13

## 2022-01-13 VITALS
TEMPERATURE: 97.4 F | WEIGHT: 205.7 LBS | BODY MASS INDEX: 32.28 KG/M2 | OXYGEN SATURATION: 98 % | HEART RATE: 72 BPM | DIASTOLIC BLOOD PRESSURE: 78 MMHG | HEIGHT: 67 IN | RESPIRATION RATE: 12 BRPM | SYSTOLIC BLOOD PRESSURE: 122 MMHG

## 2022-01-13 DIAGNOSIS — M54.10 RADICULAR PAIN: ICD-10-CM

## 2022-01-13 DIAGNOSIS — R89.9 ABNORMAL LABORATORY TEST: ICD-10-CM

## 2022-01-13 DIAGNOSIS — Z12.31 VISIT FOR SCREENING MAMMOGRAM: Primary | ICD-10-CM

## 2022-01-13 DIAGNOSIS — H57.89 RED EYE: Primary | ICD-10-CM

## 2022-01-13 DIAGNOSIS — R79.89 T3 LOW IN SERUM: ICD-10-CM

## 2022-01-13 DIAGNOSIS — M25.59 PAIN IN OTHER JOINT: ICD-10-CM

## 2022-01-13 LAB
ALBUMIN SERPL-MCNC: 4.3 G/DL (ref 3.5–5.2)
ALBUMIN/GLOB SERPL: 1.5 G/DL
ALP SERPL-CCNC: 93 U/L (ref 39–117)
ALT SERPL W P-5'-P-CCNC: 16 U/L (ref 1–33)
ANION GAP SERPL CALCULATED.3IONS-SCNC: 10.2 MMOL/L (ref 5–15)
AST SERPL-CCNC: 17 U/L (ref 1–32)
BASOPHILS # BLD AUTO: 0.04 10*3/MM3 (ref 0–0.2)
BASOPHILS NFR BLD AUTO: 0.4 % (ref 0–1.5)
BILIRUB SERPL-MCNC: 0.4 MG/DL (ref 0–1.2)
BUN SERPL-MCNC: 11 MG/DL (ref 6–20)
BUN/CREAT SERPL: 14.9 (ref 7–25)
CALCIUM SPEC-SCNC: 9.7 MG/DL (ref 8.6–10.5)
CHLORIDE SERPL-SCNC: 105 MMOL/L (ref 98–107)
CO2 SERPL-SCNC: 24.8 MMOL/L (ref 22–29)
CREAT SERPL-MCNC: 0.74 MG/DL (ref 0.57–1)
DEPRECATED RDW RBC AUTO: 40.2 FL (ref 37–54)
EOSINOPHIL # BLD AUTO: 0.14 10*3/MM3 (ref 0–0.4)
EOSINOPHIL NFR BLD AUTO: 1.4 % (ref 0.3–6.2)
ERYTHROCYTE [DISTWIDTH] IN BLOOD BY AUTOMATED COUNT: 12.4 % (ref 12.3–15.4)
GFR SERPL CREATININE-BSD FRML MDRD: 82 ML/MIN/1.73
GLOBULIN UR ELPH-MCNC: 2.8 GM/DL
GLUCOSE SERPL-MCNC: 89 MG/DL (ref 65–99)
HCT VFR BLD AUTO: 40.7 % (ref 34–46.6)
HGB BLD-MCNC: 13.8 G/DL (ref 12–15.9)
IMM GRANULOCYTES # BLD AUTO: 0.04 10*3/MM3 (ref 0–0.05)
IMM GRANULOCYTES NFR BLD AUTO: 0.4 % (ref 0–0.5)
LYMPHOCYTES # BLD AUTO: 1.64 10*3/MM3 (ref 0.7–3.1)
LYMPHOCYTES NFR BLD AUTO: 16.2 % (ref 19.6–45.3)
MCH RBC QN AUTO: 30.1 PG (ref 26.6–33)
MCHC RBC AUTO-ENTMCNC: 33.9 G/DL (ref 31.5–35.7)
MCV RBC AUTO: 88.7 FL (ref 79–97)
MONOCYTES # BLD AUTO: 0.68 10*3/MM3 (ref 0.1–0.9)
MONOCYTES NFR BLD AUTO: 6.7 % (ref 5–12)
NEUTROPHILS NFR BLD AUTO: 7.61 10*3/MM3 (ref 1.7–7)
NEUTROPHILS NFR BLD AUTO: 74.9 % (ref 42.7–76)
NRBC BLD AUTO-RTO: 0 /100 WBC (ref 0–0.2)
PLATELET # BLD AUTO: 285 10*3/MM3 (ref 140–450)
PMV BLD AUTO: 10.8 FL (ref 6–12)
POTASSIUM SERPL-SCNC: 4.5 MMOL/L (ref 3.5–5.2)
PROT SERPL-MCNC: 7.1 G/DL (ref 6–8.5)
RBC # BLD AUTO: 4.59 10*6/MM3 (ref 3.77–5.28)
SODIUM SERPL-SCNC: 140 MMOL/L (ref 136–145)
WBC NRBC COR # BLD: 10.15 10*3/MM3 (ref 3.4–10.8)

## 2022-01-13 PROCEDURE — 85670 THROMBIN TIME PLASMA: CPT | Performed by: NURSE PRACTITIONER

## 2022-01-13 PROCEDURE — 86235 NUCLEAR ANTIGEN ANTIBODY: CPT | Performed by: NURSE PRACTITIONER

## 2022-01-13 PROCEDURE — 85025 COMPLETE CBC W/AUTO DIFF WBC: CPT | Performed by: NURSE PRACTITIONER

## 2022-01-13 PROCEDURE — 80053 COMPREHEN METABOLIC PANEL: CPT | Performed by: NURSE PRACTITIONER

## 2022-01-13 PROCEDURE — 85610 PROTHROMBIN TIME: CPT | Performed by: NURSE PRACTITIONER

## 2022-01-13 PROCEDURE — 85732 THROMBOPLASTIN TIME PARTIAL: CPT | Performed by: NURSE PRACTITIONER

## 2022-01-13 PROCEDURE — 85597 PHOSPHOLIPID PLTLT NEUTRALIZ: CPT | Performed by: NURSE PRACTITIONER

## 2022-01-13 PROCEDURE — 85598 HEXAGNAL PHOSPH PLTLT NEUTRL: CPT | Performed by: NURSE PRACTITIONER

## 2022-01-13 PROCEDURE — 86376 MICROSOMAL ANTIBODY EACH: CPT | Performed by: NURSE PRACTITIONER

## 2022-01-13 PROCEDURE — 85730 THROMBOPLASTIN TIME PARTIAL: CPT | Performed by: NURSE PRACTITIONER

## 2022-01-13 PROCEDURE — 84482 T3 REVERSE: CPT | Performed by: NURSE PRACTITIONER

## 2022-01-13 PROCEDURE — 86146 BETA-2 GLYCOPROTEIN ANTIBODY: CPT | Performed by: NURSE PRACTITIONER

## 2022-01-13 PROCEDURE — 36415 COLL VENOUS BLD VENIPUNCTURE: CPT | Performed by: NURSE PRACTITIONER

## 2022-01-13 PROCEDURE — 99214 OFFICE O/P EST MOD 30 MIN: CPT | Performed by: NURSE PRACTITIONER

## 2022-01-13 PROCEDURE — 86147 CARDIOLIPIN ANTIBODY EA IG: CPT | Performed by: NURSE PRACTITIONER

## 2022-01-13 PROCEDURE — 84480 ASSAY TRIIODOTHYRONINE (T3): CPT | Performed by: NURSE PRACTITIONER

## 2022-01-13 PROCEDURE — 86800 THYROGLOBULIN ANTIBODY: CPT | Performed by: NURSE PRACTITIONER

## 2022-01-13 PROCEDURE — 85613 RUSSELL VIPER VENOM DILUTED: CPT | Performed by: NURSE PRACTITIONER

## 2022-01-13 PROCEDURE — 84481 FREE ASSAY (FT-3): CPT | Performed by: NURSE PRACTITIONER

## 2022-01-13 RX ORDER — PREDNISOLONE ACETATE 10 MG/ML
1 SUSPENSION/ DROPS OPHTHALMIC 4 TIMES DAILY
COMMUNITY
End: 2022-03-02

## 2022-01-13 RX ORDER — TIMOLOL MALEATE 5 MG/ML
1 SOLUTION/ DROPS OPHTHALMIC 2 TIMES DAILY
COMMUNITY
End: 2022-08-04

## 2022-01-13 RX ORDER — CELECOXIB 100 MG/1
100 CAPSULE ORAL 2 TIMES DAILY PRN
COMMUNITY
End: 2022-03-06 | Stop reason: SDUPTHER

## 2022-01-13 RX ORDER — CARBOXYMETHYLCELLULOSE SODIUM 5 MG/ML
SOLUTION/ DROPS OPHTHALMIC 3 TIMES DAILY PRN
COMMUNITY
End: 2022-03-02

## 2022-01-13 RX ORDER — DOXYCYCLINE HYCLATE 100 MG/1
100 CAPSULE ORAL 2 TIMES DAILY
COMMUNITY
End: 2022-06-03

## 2022-01-13 NOTE — PROGRESS NOTES
Chief Complaint  Conjunctivitis (eyes red / saw eye doctor yesterday) and Numbness (tingling in both hands/ itching hands)    Subjective          Amber Poole presents to [facility]  History of Present Illness  She went ot see Dr Caldera and started her celebrex.  He asked her if she was checked for MS or auto immune.  She went to [facility] for her eyes and was given prednisone drops and gel drops and that didn't help the redness and they don't itch at all.  She has been having the pains in the left arm to the hands.  Then she went back to the eye dr due to the pressure was elevated.  Dr Begum thinks that it is coming from the immune system.  Her eyes are dry.  She is going to [place] in March.  She then went back to Dr Caldera--he thinks it was psoriatic arthritis.  He then gave her a steroid shot and then placed on steroids pill taper.  The shot stopped the ache pain.  There is still tingling and numb on the left middle finger is still there.  Her fingers are cold and decreased the sensations.  She is having joint pain and she wanting to itch her skin.  She can't go to sleep.  She is trying to go bed at 930 and then 1130-12 she will doze off then wake up around 2 and then she is waking for work at 4:45.        Allergies  Penicillins    Social History     Tobacco Use   • Smoking status: Never Smoker   • Smokeless tobacco: Never Used   Substance Use Topics   • Alcohol use: Never   • Drug use: Never       Family History   Problem Relation Age of Onset   • Diabetes Mother    • Kidney nephrosis Mother    • Heart disease Father    • Vision loss Father    • Macular degeneration Father    • Kidney nephrosis Maternal Grandmother    • Other Maternal Grandmother         BLADDER CALCULUS   • Liver cancer Other         UNSPECIFIED   • Lung cancer Other         UNSPECIFIED   • Cancer Daughter    • Leukemia Daughter    • Leukemia Son    • Macular degeneration Sister         Health  "Maintenance Due   Topic Date Due   • COLORECTAL CANCER SCREENING  Never done   • ANNUAL PHYSICAL  Never done   • COVID-19 Vaccine (1) Never done   • TDAP/TD VACCINES (1 - Tdap) Never done   • ZOSTER VACCINE (1 of 2) Never done   • HEPATITIS C SCREENING  Never done   • PAP SMEAR  Never done   • INFLUENZA VACCINE  08/01/2021        Immunization History   Administered Date(s) Administered   • Influenza, Unspecified 10/01/2020       Review of Systems   Constitutional: Negative for fatigue.   Respiratory: Negative for cough and shortness of breath.    Cardiovascular: Negative for chest pain and leg swelling.   Gastrointestinal: Positive for constipation. Negative for diarrhea, nausea and vomiting.   Skin: Negative for rash.   Neurological: Positive for numbness.   Psychiatric/Behavioral: Negative for hallucinations and suicidal ideas.        Objective       Vitals:    01/13/22 1111   BP: 122/78   Pulse: 72   Resp: 12   Temp: 97.4 °F (36.3 °C)   SpO2: 98%   Weight: 93.3 kg (205 lb 11.2 oz)   Height: 170.2 cm (67\")       Body mass index is 32.22 kg/m².         Physical Exam  Vitals reviewed.   Constitutional:       Appearance: Normal appearance. She is well-developed.   Cardiovascular:      Rate and Rhythm: Normal rate and regular rhythm.      Heart sounds: Normal heart sounds. No murmur heard.      Pulmonary:      Effort: Pulmonary effort is normal.      Breath sounds: Normal breath sounds.   Neurological:      Mental Status: She is alert and oriented to person, place, and time.      Cranial Nerves: No cranial nerve deficit.      Motor: No weakness.   Psychiatric:         Mood and Affect: Mood and affect normal.             Result Review :     The following data was reviewed by: SHAILESH Colvin on 01/13/2022:    Common labs    Common Labsle 4/21/21 8/16/21 8/16/21 8/16/21 11/11/21     1009 1009 1009    Glucose 96  89     BUN 22  10     Creatinine 0.77  0.69     eGFR Non African Am   89     Sodium 139  144   "   Potassium 4.1  4.6     Chloride 106  108 (A)     Calcium 9.1  9.1     Albumin 3.9  4.00     Total Bilirubin 0.21  0.4     Alkaline Phosphatase 79  122 (A)     AST (SGOT) 22  42 (A)     ALT (SGPT) 20  32     WBC 7.28 5.30   6.47   Hemoglobin 12.9 13.5   13.0   Hematocrit 42.1 41.0   37.9   Platelets 294 244   253   Total Cholesterol    137    Total Cholesterol 193       Triglycerides 138   110    HDL Cholesterol 41   36 (A)    LDL Cholesterol  124 (A)   81    (A) Abnormal value       Comments are available for some flowsheets but are not being displayed.                          Assessment and Plan      Diagnoses and all orders for this visit:    1. Red eye (Primary)  -     Comprehensive Metabolic Panel  -     CBC & Differential  -     Sjogren's Antibody, Anti-SS-A / -SS-B  -     Lupus Anticoagulant Panel  -     Lupus Anticoagulant Reflex  -     Cancel: Thyroid Peroxidase Antibody  -     Thyroid Antibodies  -     Triiodothyronine (T3) Total & Free  -     T3, Reverse  -     Ambulatory Referral to Neurology    2. Radicular pain  -     Comprehensive Metabolic Panel  -     CBC & Differential  -     Sjogren's Antibody, Anti-SS-A / -SS-B  -     Lupus Anticoagulant Panel  -     Lupus Anticoagulant Reflex  -     Cancel: Thyroid Peroxidase Antibody  -     Thyroid Antibodies  -     Triiodothyronine (T3) Total & Free  -     T3, Reverse  -     Ambulatory Referral to Neurology  -     XR Spine Lumbar 4+ View; Future    3. Pain in other joint  -     Comprehensive Metabolic Panel  -     CBC & Differential  -     Sjogren's Antibody, Anti-SS-A / -SS-B  -     Lupus Anticoagulant Panel  -     Lupus Anticoagulant Reflex  -     Cancel: Thyroid Peroxidase Antibody  -     Thyroid Antibodies  -     Triiodothyronine (T3) Total & Free  -     T3, Reverse  -     Ambulatory Referral to Neurology            Follow Up     Return if symptoms worsen or fail to improve.  We will do xray of the neck and poss need to do MRI of the neck/head if needed.   We will check labs today.  We will refer to neurology for EMG testing.  Patient was given instructions and counseling regarding her condition or for health maintenance advice. Please see specific information pulled into the AVS if appropriate.         Faith Ramos, SHAILESH  01/13/2022

## 2022-01-15 LAB
LA 2 SCREEN W REFLEX-IMP: NORMAL
SCREEN APTT: 35 SEC (ref 0–51.9)
SCREEN DRVVT: 33.2 SEC (ref 0–47)

## 2022-01-16 LAB
ENA SS-A AB SER-ACNC: <0.2 AI (ref 0–0.9)
ENA SS-B AB SER-ACNC: <0.2 AI (ref 0–0.9)
T3 SERPL-MCNC: 68 NG/DL (ref 71–180)
T3FREE SERPL-MCNC: 2.3 PG/ML (ref 2–4.4)

## 2022-01-18 ENCOUNTER — TELEPHONE (OUTPATIENT)
Dept: FAMILY MEDICINE CLINIC | Facility: CLINIC | Age: 57
End: 2022-01-18

## 2022-01-18 LAB
THYROGLOB AB SERPL-ACNC: 107.2 IU/ML (ref 0–0.9)
THYROPEROXIDASE AB SERPL-ACNC: 9 IU/ML (ref 0–34)

## 2022-01-20 ENCOUNTER — TELEPHONE (OUTPATIENT)
Dept: NEUROLOGY | Facility: CLINIC | Age: 57
End: 2022-01-20

## 2022-01-20 NOTE — TELEPHONE ENCOUNTER
Caller: JOI    Relationship to patient:   [facility]  TWILA [employer]    Best call back number: [phone]    Chief complaint: WANTING A SOONER APPT IF POSSIBLE    If rescheduling, when is the original appointment: 2-24-22    Additional notes: PT IS A  AND SHE EXPERIENCING TINGLING AND NUMBNESS IN BOTH HANDS AND SHE'S HAVING DIFFICULTY SLEEPING. THIS IS CAUSING HER DIFFICULTY IN HER JOB.    IF A SOONER APPT IS AVAILABLE CALL PT.

## 2022-01-21 ENCOUNTER — HOSPITAL ENCOUNTER (OUTPATIENT)
Dept: GENERAL RADIOLOGY | Facility: HOSPITAL | Age: 57
Discharge: HOME | End: 2022-01-21

## 2022-01-21 DIAGNOSIS — M54.2 NECK PAIN: ICD-10-CM

## 2022-01-21 DIAGNOSIS — M54.10 RADICULAR PAIN: Primary | ICD-10-CM

## 2022-01-21 DIAGNOSIS — M54.10 RADICULAR PAIN: ICD-10-CM

## 2022-01-21 PROCEDURE — 72040 X-RAY EXAM NECK SPINE 2-3 VW: CPT

## 2022-01-24 LAB — T3REVERSE SERPL-MCNC: NORMAL PG/ML

## 2022-01-26 ENCOUNTER — LAB (OUTPATIENT)
Dept: FAMILY MEDICINE CLINIC | Facility: CLINIC | Age: 57
End: 2022-01-26

## 2022-01-26 DIAGNOSIS — M25.59 PAIN IN OTHER JOINT: ICD-10-CM

## 2022-01-26 DIAGNOSIS — H57.89 RED EYE: Primary | ICD-10-CM

## 2022-01-26 PROCEDURE — 86147 CARDIOLIPIN ANTIBODY EA IG: CPT | Performed by: NURSE PRACTITIONER

## 2022-01-26 PROCEDURE — 84482 T3 REVERSE: CPT | Performed by: NURSE PRACTITIONER

## 2022-01-26 PROCEDURE — 85610 PROTHROMBIN TIME: CPT | Performed by: NURSE PRACTITIONER

## 2022-01-26 PROCEDURE — 85597 PHOSPHOLIPID PLTLT NEUTRALIZ: CPT | Performed by: NURSE PRACTITIONER

## 2022-01-26 PROCEDURE — 86146 BETA-2 GLYCOPROTEIN ANTIBODY: CPT | Performed by: NURSE PRACTITIONER

## 2022-01-26 PROCEDURE — 85670 THROMBIN TIME PLASMA: CPT | Performed by: NURSE PRACTITIONER

## 2022-01-26 PROCEDURE — 85732 THROMBOPLASTIN TIME PARTIAL: CPT | Performed by: NURSE PRACTITIONER

## 2022-01-26 PROCEDURE — 85613 RUSSELL VIPER VENOM DILUTED: CPT | Performed by: NURSE PRACTITIONER

## 2022-01-26 PROCEDURE — 85598 HEXAGNAL PHOSPH PLTLT NEUTRL: CPT | Performed by: NURSE PRACTITIONER

## 2022-01-26 PROCEDURE — 85730 THROMBOPLASTIN TIME PARTIAL: CPT | Performed by: NURSE PRACTITIONER

## 2022-01-27 ENCOUNTER — PROCEDURE VISIT (OUTPATIENT)
Dept: NEUROLOGY | Facility: CLINIC | Age: 57
End: 2022-01-27

## 2022-01-27 VITALS
SYSTOLIC BLOOD PRESSURE: 135 MMHG | HEIGHT: 67 IN | HEART RATE: 63 BPM | DIASTOLIC BLOOD PRESSURE: 81 MMHG | WEIGHT: 209 LBS | BODY MASS INDEX: 32.8 KG/M2

## 2022-01-27 DIAGNOSIS — G56.03 BILATERAL CARPAL TUNNEL SYNDROME: Primary | ICD-10-CM

## 2022-01-27 PROCEDURE — 99202 OFFICE O/P NEW SF 15 MIN: CPT | Performed by: PSYCHIATRY & NEUROLOGY

## 2022-01-27 PROCEDURE — 95909 NRV CNDJ TST 5-6 STUDIES: CPT | Performed by: PSYCHIATRY & NEUROLOGY

## 2022-01-27 NOTE — PROGRESS NOTES
"Chief Complaint  Numbness (BUE), Pain (BUE), and Extremity Weakness (BUE)    Subjective          Amber Poole is a 56 y.o. female who presents to [facility]  History of Present Illness  56-year-old woman evaluated for bilateral hand numbness left greater than right.  She states symptoms started 2 months ago on her right hand that it would come and go that she would get intermittent numbness when she is doing activities of daily living such as holding her phone.  It would occur several times a day.  She had to her hands to get rid of the numbness.  It felt like an electric shock.  The right index finger feels cold all the time.  She states that 3 weeks later it started happening on the left side and it goes up her arm when it is worse.  She states her left hand gets numb and tingly especially the middle finger.  When he gets numb and tingly it goes all the way up her arms.  It happens several times a day.  She is right-handed.  She is a .  She has to do repetitive movements.  She has been wearing a wrist splint at night which is slightly helpful.  Objective   Vital Signs:   /81   Pulse 63   Ht 170.2 cm (67\")   Wt 94.8 kg (209 lb)   BMI 32.73 kg/m²     Physical Exam   There is no weakness of the upper extremity individual muscle testing.  Phalen sign is positive reproducing the tingling in the fingers.  Pressure the wrist produces tingling in her left hand that goes up her arm.        Assessment and Plan  Diagnoses and all orders for this visit:    1. Bilateral carpal tunnel syndrome (Primary)  Assessment & Plan:  Nerve conduction study is abnormal and shows electrophysiologic evidence for severe left carpal tunnel syndrome and moderate right carpal tunnel syndrome.  I will refer her to orthopedic surgery to have carpal tunnel surgery.    Orders:  -     Ambulatory Referral to Orthopedic Surgery       Nerve Conduction Study:  6 nerves     EMG:  Not " done    Total time spent with the patient and coordinating patient care was 25 minutes.    Follow Up  No follow-ups on file.  Patient was given instructions and counseling regarding her condition or for health maintenance advice. Please see specific information pulled into the AVS if appropriate.

## 2022-01-27 NOTE — ASSESSMENT & PLAN NOTE
Nerve conduction study is abnormal and shows electrophysiologic evidence for severe left carpal tunnel syndrome and moderate right carpal tunnel syndrome.  I will refer her to orthopedic surgery to have carpal tunnel surgery.

## 2022-02-01 DIAGNOSIS — E03.9 ACQUIRED HYPOTHYROIDISM: ICD-10-CM

## 2022-02-01 DIAGNOSIS — J30.2 SEASONAL ALLERGIC RHINITIS, UNSPECIFIED TRIGGER: ICD-10-CM

## 2022-02-01 DIAGNOSIS — R05.9 COUGH: ICD-10-CM

## 2022-02-02 ENCOUNTER — OFFICE VISIT (OUTPATIENT)
Dept: ORTHOPEDIC SURGERY | Facility: CLINIC | Age: 57
End: 2022-02-02

## 2022-02-02 VITALS — BODY MASS INDEX: 31.39 KG/M2 | OXYGEN SATURATION: 96 % | WEIGHT: 200 LBS | HEIGHT: 67 IN | HEART RATE: 65 BPM

## 2022-02-02 DIAGNOSIS — G56.03 BILATERAL CARPAL TUNNEL SYNDROME: Primary | ICD-10-CM

## 2022-02-02 PROCEDURE — 99213 OFFICE O/P EST LOW 20 MIN: CPT | Performed by: ORTHOPAEDIC SURGERY

## 2022-02-02 PROCEDURE — 20526 THER INJECTION CARP TUNNEL: CPT | Performed by: ORTHOPAEDIC SURGERY

## 2022-02-02 RX ORDER — LEVOTHYROXINE SODIUM 0.03 MG/1
TABLET ORAL
Qty: 90 TABLET | Refills: 1 | Status: SHIPPED | OUTPATIENT
Start: 2022-02-02 | End: 2022-03-02 | Stop reason: SDUPTHER

## 2022-02-02 RX ORDER — LEVOTHYROXINE SODIUM 0.2 MG/1
TABLET ORAL
Qty: 90 TABLET | Refills: 1 | Status: SHIPPED | OUTPATIENT
Start: 2022-02-02 | End: 2022-03-02 | Stop reason: ALTCHOICE

## 2022-02-02 RX ORDER — TRIAMCINOLONE ACETONIDE 40 MG/ML
40 INJECTION, SUSPENSION INTRA-ARTICULAR; INTRAMUSCULAR
Status: COMPLETED | OUTPATIENT
Start: 2022-02-02 | End: 2022-02-02

## 2022-02-02 RX ORDER — BUPIVACAINE HYDROCHLORIDE 2.5 MG/ML
1 INJECTION, SOLUTION INFILTRATION; PERINEURAL
Status: COMPLETED | OUTPATIENT
Start: 2022-02-02 | End: 2022-02-02

## 2022-02-02 RX ORDER — MONTELUKAST SODIUM 10 MG/1
TABLET ORAL
Qty: 90 TABLET | Refills: 1 | Status: SHIPPED | OUTPATIENT
Start: 2022-02-02 | End: 2022-08-03

## 2022-02-02 RX ADMIN — BUPIVACAINE HYDROCHLORIDE 1 ML: 2.5 INJECTION, SOLUTION INFILTRATION; PERINEURAL at 10:41

## 2022-02-02 RX ADMIN — TRIAMCINOLONE ACETONIDE 40 MG: 40 INJECTION, SUSPENSION INTRA-ARTICULAR; INTRAMUSCULAR at 10:41

## 2022-02-02 NOTE — PROGRESS NOTES
"Chief Complaint  Initial Evaluation of the Left Hand and Initial Evaluation of the Right Hand     Subjective      Amber Poole presents to [facility] for an evaluation of bilateral hands. She states symptoms began in her right hand and migrated to the left hand. She states that now most of her symptoms returned to the right hand. She states her right hand bothers her the most today. She has numbness and tingling in bilateral hands. Patient has a possible autoimmune diseases.  She is being referred to a endocrinologist by her PCP.     Allergies   Allergen Reactions   • Penicillins Rash        Social History     Socioeconomic History   • Marital status:    Tobacco Use   • Smoking status: Never Smoker   • Smokeless tobacco: Never Used   Substance and Sexual Activity   • Alcohol use: Never   • Drug use: Never        Review of Systems     Objective   Vital Signs:   Pulse 65   Ht 170.2 cm (67\")   Wt 90.7 kg (200 lb)   SpO2 96%   BMI 31.32 kg/m²       Physical Exam  Constitutional:       Appearance: Normal appearance. Patient is well-developed and normal weight.   HENT:      Head: Normocephalic.      Right Ear: Hearing and external ear normal.      Left Ear: Hearing and external ear normal.      Nose: Nose normal.   Eyes:      Conjunctiva/sclera: Conjunctivae normal.   Cardiovascular:      Rate and Rhythm: Normal rate.   Pulmonary:      Effort: Pulmonary effort is normal.      Breath sounds: No wheezing or rales.   Abdominal:      Palpations: Abdomen is soft.      Tenderness: There is no abdominal tenderness.   Musculoskeletal:      Cervical back: Normal range of motion.   Skin:     Findings: No rash.   Neurological:      Mental Status: Patient  is alert and oriented to person, place, and time.   Psychiatric:         Mood and Affect: Mood and affect normal.         Judgment: Judgment normal.       Ortho Exam      BILATERAL HANDS:  Positive median nerve compression. Positive " tinels. Neurovascular intact. Sensation grossly intact. No swelling, atrophy, and skin discoloration. Skin intact. Full ROM. Patient able to wiggle fingers and make a fist. Full wrist extension, full wrist flexion, full , full thumb opposition, full PIP flexors, full DIP flexors, full PIP extensors, full finger adduction, full finger abduction. Radial pulse 2+, ulnar pulse 2+.       Small Joint Arthrocentesis  Consent given by: patient  Site marked: site marked  Timeout: Immediately prior to procedure a time out was called to verify the correct patient, procedure, equipment, support staff and site/side marked as required   Procedure Details  Preparation: Patient was prepped and draped in the usual sterile fashion  Needle gauge: 23g.  Medications administered: 1 mL bupivacaine 0.25 %; 40 mg triamcinolone acetonide 40 MG/ML  Patient tolerance: patient tolerated the procedure well with no immediate complications              Imaging Results (Most Recent)     None           Result Review :     [facility]   1/27/22  EMG   IMPRESSION: Severe left carpal tunnel syndrome and moderate right carpal tunnel syndrome.              Assessment and Plan     DX: Bilateral carpal tunnel syndrome     Discussed treatment plans with the patient. Patient wants to see the endocrinologist first and figure out the autoimmune disorder. She opted for a right carpal tunnel injection, she tolerated this well.     Call or return if worsening symptoms.    Follow Up     4 weeks.       Patient was given instructions and counseling regarding her condition or for health maintenance advice. Please see specific information pulled into the AVS if appropriate.     Scribed for Calr Swartz MD by Duyen Lockwood.  02/02/22   09:47 EST    I have personally performed the services described in this document as scribed by the above individual and it is both accurate and complete. Carl Swartz MD 02/02/22

## 2022-02-04 LAB — T3REVERSE SERPL-MCNC: 14.3 NG/DL (ref 9.2–24.1)

## 2022-02-09 LAB
APTT HEX PL PPP: 0 SEC
APTT IMM NP PPP: NORMAL S
APTT PPP 1:1 SALINE: NORMAL S
APTT PPP: 24.8 SEC
B2 GLYCOPROT1 IGA SER-ACNC: <10 SAU
B2 GLYCOPROT1 IGG SER-ACNC: <10 SGU
B2 GLYCOPROT1 IGM SER-ACNC: <10 SMU
CARDIOLIPIN IGG SER IA-ACNC: <10 GPL
CARDIOLIPIN IGM SER IA-ACNC: <10 MPL
CONFIRM APTT: 0.5 SEC
CONFIRM DRVVT: NORMAL S
DRVVT SCREEN TO CONFIRM RATIO: NORMAL {RATIO}
INR PPP: 0.9 RATIO
LABORATORY COMMENT REPORT: NORMAL
PROTHROMBIN TIME: 9.7 SEC
SCREEN DRVVT: 31.9 SEC
THROMBIN TIME: 16.8 SEC

## 2022-02-21 LAB
APTT HEX PL PPP: 0 SEC
APTT IMM NP PPP: NORMAL S
APTT PPP 1:1 SALINE: NORMAL S
APTT PPP: 27.4 SEC
B2 GLYCOPROT1 IGA SER-ACNC: <10 SAU
B2 GLYCOPROT1 IGG SER-ACNC: <10 SGU
B2 GLYCOPROT1 IGM SER-ACNC: <10 SMU
CARDIOLIPIN IGG SER IA-ACNC: <10 GPL
CARDIOLIPIN IGM SER IA-ACNC: <10 MPL
CONFIRM APTT: 0.9 SEC
CONFIRM DRVVT: NORMAL S
DRVVT SCREEN TO CONFIRM RATIO: NORMAL {RATIO}
INR PPP: 0.9 RATIO
LABORATORY COMMENT REPORT: NORMAL
PROTHROMBIN TIME: 9.9 SEC
SCREEN DRVVT: 28.4 SEC
THROMBIN TIME: 16.1 SEC

## 2022-03-02 ENCOUNTER — OFFICE VISIT (OUTPATIENT)
Dept: FAMILY MEDICINE CLINIC | Facility: CLINIC | Age: 57
End: 2022-03-02

## 2022-03-02 VITALS
OXYGEN SATURATION: 98 % | RESPIRATION RATE: 16 BRPM | HEIGHT: 67 IN | BODY MASS INDEX: 32.16 KG/M2 | TEMPERATURE: 97.7 F | WEIGHT: 204.9 LBS | HEART RATE: 73 BPM | SYSTOLIC BLOOD PRESSURE: 127 MMHG | DIASTOLIC BLOOD PRESSURE: 83 MMHG

## 2022-03-02 DIAGNOSIS — M25.59 PAIN IN OTHER JOINT: ICD-10-CM

## 2022-03-02 DIAGNOSIS — D50.9 IRON DEFICIENCY ANEMIA, UNSPECIFIED IRON DEFICIENCY ANEMIA TYPE: Primary | ICD-10-CM

## 2022-03-02 DIAGNOSIS — R23.2 HOT FLASHES: ICD-10-CM

## 2022-03-02 LAB
BASOPHILS # BLD AUTO: 0.04 10*3/MM3 (ref 0–0.2)
BASOPHILS NFR BLD AUTO: 0.5 % (ref 0–1.5)
DEPRECATED RDW RBC AUTO: 41.3 FL (ref 37–54)
EOSINOPHIL # BLD AUTO: 0.23 10*3/MM3 (ref 0–0.4)
EOSINOPHIL NFR BLD AUTO: 2.6 % (ref 0.3–6.2)
ERYTHROCYTE [DISTWIDTH] IN BLOOD BY AUTOMATED COUNT: 12.7 % (ref 12.3–15.4)
FERRITIN SERPL-MCNC: 77.1 NG/ML (ref 13–150)
HCT VFR BLD AUTO: 43.1 % (ref 34–46.6)
HGB BLD-MCNC: 14.1 G/DL (ref 12–15.9)
IMM GRANULOCYTES # BLD AUTO: 0.04 10*3/MM3 (ref 0–0.05)
IMM GRANULOCYTES NFR BLD AUTO: 0.5 % (ref 0–0.5)
IRON 24H UR-MRATE: 74 MCG/DL (ref 37–145)
IRON SATN MFR SERPL: 20 % (ref 20–50)
LYMPHOCYTES # BLD AUTO: 1.59 10*3/MM3 (ref 0.7–3.1)
LYMPHOCYTES NFR BLD AUTO: 18 % (ref 19.6–45.3)
MCH RBC QN AUTO: 29.3 PG (ref 26.6–33)
MCHC RBC AUTO-ENTMCNC: 32.7 G/DL (ref 31.5–35.7)
MCV RBC AUTO: 89.4 FL (ref 79–97)
MONOCYTES # BLD AUTO: 0.61 10*3/MM3 (ref 0.1–0.9)
MONOCYTES NFR BLD AUTO: 6.9 % (ref 5–12)
NEUTROPHILS NFR BLD AUTO: 6.33 10*3/MM3 (ref 1.7–7)
NEUTROPHILS NFR BLD AUTO: 71.5 % (ref 42.7–76)
NRBC BLD AUTO-RTO: 0 /100 WBC (ref 0–0.2)
PLATELET # BLD AUTO: 274 10*3/MM3 (ref 140–450)
PMV BLD AUTO: 11.1 FL (ref 6–12)
RBC # BLD AUTO: 4.82 10*6/MM3 (ref 3.77–5.28)
TIBC SERPL-MCNC: 368 MCG/DL (ref 298–536)
TRANSFERRIN SERPL-MCNC: 247 MG/DL (ref 200–360)
WBC NRBC COR # BLD: 8.84 10*3/MM3 (ref 3.4–10.8)

## 2022-03-02 PROCEDURE — 83540 ASSAY OF IRON: CPT | Performed by: NURSE PRACTITIONER

## 2022-03-02 PROCEDURE — 85025 COMPLETE CBC W/AUTO DIFF WBC: CPT | Performed by: NURSE PRACTITIONER

## 2022-03-02 PROCEDURE — 84466 ASSAY OF TRANSFERRIN: CPT | Performed by: NURSE PRACTITIONER

## 2022-03-02 PROCEDURE — 82728 ASSAY OF FERRITIN: CPT | Performed by: NURSE PRACTITIONER

## 2022-03-02 PROCEDURE — 99213 OFFICE O/P EST LOW 20 MIN: CPT | Performed by: NURSE PRACTITIONER

## 2022-03-02 RX ORDER — LIOTHYRONINE SODIUM 5 UG/1
10 TABLET ORAL DAILY
COMMUNITY

## 2022-03-02 RX ORDER — ESCITALOPRAM OXALATE 10 MG/1
10 TABLET ORAL DAILY
Qty: 30 TABLET | Refills: 5 | Status: SHIPPED | OUTPATIENT
Start: 2022-03-02 | End: 2022-09-13 | Stop reason: SDUPTHER

## 2022-03-02 RX ORDER — CELECOXIB 100 MG/1
100 CAPSULE ORAL 2 TIMES DAILY PRN
Status: CANCELLED | OUTPATIENT
Start: 2022-03-02

## 2022-03-02 NOTE — PROGRESS NOTES
Answers for HPI/ROS submitted by the patient on 2/24/2022  Please describe your symptoms.: Labs done for iron check  Have you had these symptoms before?: Yes  How long have you been having these symptoms?: Greater than 2 weeks  What is the primary reason for your visit?: Other    Chief Complaint  Med Refill (lab to check Iron)    Subjective          Amber Poole presents to [facility]  History of Present Illness  She was doing good with the lexapro.  She then started to have hot flashes again.  She goes to endo 3/22--she started cytomel in Feb.  She did start the synthroid about 10 days ago with name brand.  She started with hot flash labs about the same time.        Allergies  Penicillins    Social History     Tobacco Use   • Smoking status: Never Smoker   • Smokeless tobacco: Never Used   Substance Use Topics   • Alcohol use: Never   • Drug use: Never       Family History   Problem Relation Age of Onset   • Diabetes Mother    • Kidney nephrosis Mother    • Heart disease Father    • Vision loss Father    • Macular degeneration Father    • Kidney nephrosis Maternal Grandmother    • Other Maternal Grandmother         BLADDER CALCULUS   • Liver cancer Other         UNSPECIFIED   • Lung cancer Other         UNSPECIFIED   • Cancer Daughter    • Leukemia Daughter    • Leukemia Son    • Macular degeneration Sister         Health Maintenance Due   Topic Date Due   • COLORECTAL CANCER SCREENING  Never done   • ANNUAL PHYSICAL  Never done   • COVID-19 Vaccine (1) Never done   • TDAP/TD VACCINES (1 - Tdap) Never done   • ZOSTER VACCINE (1 of 2) Never done   • HEPATITIS C SCREENING  Never done   • PAP SMEAR  Never done   • INFLUENZA VACCINE  08/01/2021        Immunization History   Administered Date(s) Administered   • Influenza, Unspecified 10/01/2020       Review of Systems   Eyes: Positive for blurred vision.   Respiratory: Negative for shortness of breath.    Cardiovascular: Negative  "for chest pain.        Objective       Vitals:    03/02/22 0918   BP: 127/83   Pulse: 73   Resp: 16   Temp: 97.7 °F (36.5 °C)   SpO2: 98%   Weight: 92.9 kg (204 lb 14.4 oz)   Height: 170.2 cm (67\")       Body mass index is 32.09 kg/m².         Physical Exam  Vitals reviewed.   Constitutional:       Appearance: Normal appearance. She is well-developed.   Eyes:      Comments: Red eyes travon noted.  She is using her pressure.   Cardiovascular:      Rate and Rhythm: Normal rate and regular rhythm.      Heart sounds: Normal heart sounds. No murmur heard.      Pulmonary:      Effort: Pulmonary effort is normal.      Breath sounds: Normal breath sounds.   Neurological:      Mental Status: She is alert and oriented to person, place, and time.      Cranial Nerves: No cranial nerve deficit.      Motor: No weakness.   Psychiatric:         Mood and Affect: Mood and affect normal.             Result Review :     The following data was reviewed by: SHAILESH Colvin on 03/02/2022:    Common labs    Common Labsle 8/16/21 8/16/21 8/16/21 11/11/21 1/13/22 1/13/22    1009 1009 1009  1148 1148   Glucose  89    89   BUN  10    11   Creatinine  0.69    0.74   eGFR Non African Am  89    82   Sodium  144    140   Potassium  4.6    4.5   Chloride  108 (A)    105   Calcium  9.1    9.7   Albumin  4.00    4.30   Total Bilirubin  0.4    0.4   Alkaline Phosphatase  122 (A)    93   AST (SGOT)  42 (A)    17   ALT (SGPT)  32    16   WBC 5.30   6.47 10.15    Hemoglobin 13.5   13.0 13.8    Hematocrit 41.0   37.9 40.7    Platelets 244   253 285    Total Cholesterol   137      Triglycerides   110      HDL Cholesterol   36 (A)      LDL Cholesterol    81      (A) Abnormal value                           Assessment and Plan      Diagnoses and all orders for this visit:    1. Iron deficiency anemia, unspecified iron deficiency anemia type (Primary)  -     Ferritin  -     Iron Profile  -     CBC Auto Differential    2. Hot flashes  -     " escitalopram (Lexapro) 10 MG tablet; Take 1 tablet by mouth Daily.  Dispense: 30 tablet; Refill: 5    3. Pain in other joint            Follow Up     Return in about 6 months (around 9/2/2022).  She will trial no celebrex for 1 week and if she can't tell a difference with the med do not start back but if she can tell a difference then she will let me know and I send rx in to the pharm.  If iron levels are normal we will stop iron.  Patient was given instructions and counseling regarding her condition or for health maintenance advice. Please see specific information pulled into the AVS if appropriate.         Faith Ramos, APRN  03/02/2022

## 2022-03-05 ENCOUNTER — PATIENT MESSAGE (OUTPATIENT)
Dept: FAMILY MEDICINE CLINIC | Facility: CLINIC | Age: 57
End: 2022-03-05

## 2022-03-06 RX ORDER — CELECOXIB 100 MG/1
100 CAPSULE ORAL 2 TIMES DAILY
Qty: 180 CAPSULE | Refills: 1 | Status: SHIPPED | OUTPATIENT
Start: 2022-03-06 | End: 2022-09-13 | Stop reason: SDUPTHER

## 2022-03-06 NOTE — TELEPHONE ENCOUNTER
From: Amber Poole  To: SHAILESH Colvin  Sent: 3/5/2022 6:23 PM EST  Subject: Celebrex    I need you to send me refills in for my Celebrex, Saturday I had to start taking it again because my hands and knees were hurting so bad. Please if possible send 100mg @2 times a day for 90days. Thank you!

## 2022-03-17 ENCOUNTER — TRANSCRIBE ORDERS (OUTPATIENT)
Dept: LAB | Facility: HOSPITAL | Age: 57
End: 2022-03-17

## 2022-03-17 ENCOUNTER — LAB (OUTPATIENT)
Dept: LAB | Facility: HOSPITAL | Age: 57
End: 2022-03-17

## 2022-03-17 DIAGNOSIS — I51.9 MYXEDEMA HEART DISEASE: ICD-10-CM

## 2022-03-17 DIAGNOSIS — E03.9 MYXEDEMA HEART DISEASE: Primary | ICD-10-CM

## 2022-03-17 DIAGNOSIS — I51.9 MYXEDEMA HEART DISEASE: Primary | ICD-10-CM

## 2022-03-17 DIAGNOSIS — E03.9 MYXEDEMA HEART DISEASE: ICD-10-CM

## 2022-03-17 PROCEDURE — 84481 FREE ASSAY (FT-3): CPT

## 2022-03-17 PROCEDURE — 36415 COLL VENOUS BLD VENIPUNCTURE: CPT

## 2022-03-17 PROCEDURE — 84443 ASSAY THYROID STIM HORMONE: CPT

## 2022-03-17 PROCEDURE — 84439 ASSAY OF FREE THYROXINE: CPT

## 2022-03-18 LAB
T3FREE SERPL-MCNC: 2.34 PG/ML (ref 2–4.4)
T4 FREE SERPL-MCNC: 1.31 NG/DL (ref 0.93–1.7)
TSH SERPL DL<=0.05 MIU/L-ACNC: 3.49 UIU/ML (ref 0.27–4.2)

## 2022-05-16 ENCOUNTER — TELEPHONE (OUTPATIENT)
Dept: ORTHOPEDIC SURGERY | Facility: CLINIC | Age: 57
End: 2022-05-16

## 2022-05-16 NOTE — TELEPHONE ENCOUNTER
Provider: DR HANNON     Caller: OSCAR CANNON     Relationship to Patient: SELF     Phone Number: [phone]    Reason for Call: PATIENT CALLED AND STATED THAT SHE WANTS TO BE SCHEDULED FOR AN INJECTION PLEASE ADVISE

## 2022-06-03 ENCOUNTER — OFFICE VISIT (OUTPATIENT)
Dept: ORTHOPEDIC SURGERY | Facility: CLINIC | Age: 57
End: 2022-06-03

## 2022-06-03 VITALS — HEIGHT: 67 IN | BODY MASS INDEX: 30.76 KG/M2 | WEIGHT: 196 LBS

## 2022-06-03 DIAGNOSIS — M17.12 PRIMARY OSTEOARTHRITIS OF LEFT KNEE: ICD-10-CM

## 2022-06-03 DIAGNOSIS — M25.562 LEFT KNEE PAIN, UNSPECIFIED CHRONICITY: Primary | ICD-10-CM

## 2022-06-03 PROCEDURE — 99213 OFFICE O/P EST LOW 20 MIN: CPT | Performed by: ORTHOPAEDIC SURGERY

## 2022-06-03 RX ORDER — LEVOTHYROXINE SODIUM 300 MCG
300 TABLET ORAL DAILY
COMMUNITY
Start: 2022-03-23

## 2022-06-03 NOTE — PROGRESS NOTES
"Chief Complaint  Follow-up of the Left Knee     Subjective      Amber Poole presents to [facility] for a follow-up of left knee. She has had injections in the past, these have given her relief. She states Synvisc and Zilretta injection have given her relief in the past. Last injection was Zilretta, this gave her relief up until 1 month ago. She hasn't had any new injury or trauma to the knee recently.     She has a history of left knee arthroscopic partial medial meniscectomy, chondroplasty of medial femoral condyle, chondroplasty of patella performed 8/6/20 by Dr. Swartz.       Allergies   Allergen Reactions   • Codeine Anaphylaxis and Rash   • Penicillins Rash        Social History     Socioeconomic History   • Marital status:    Tobacco Use   • Smoking status: Never Smoker   • Smokeless tobacco: Never Used   Substance and Sexual Activity   • Alcohol use: Never   • Drug use: Never        Review of Systems     Objective   Vital Signs:   Ht 170.2 cm (67\")   Wt 88.9 kg (196 lb)   BMI 30.70 kg/m²       Physical Exam  Constitutional:       Appearance: Normal appearance. Patient is well-developed and normal weight.   HENT:      Head: Normocephalic.      Right Ear: Hearing and external ear normal.      Left Ear: Hearing and external ear normal.      Nose: Nose normal.   Eyes:      Conjunctiva/sclera: Conjunctivae normal.   Cardiovascular:      Rate and Rhythm: Normal rate.   Pulmonary:      Effort: Pulmonary effort is normal.      Breath sounds: No wheezing or rales.   Abdominal:      Palpations: Abdomen is soft.      Tenderness: There is no abdominal tenderness.   Musculoskeletal:      Cervical back: Normal range of motion.   Skin:     Findings: No rash.   Neurological:      Mental Status: Patient  is alert and oriented to person, place, and time.   Psychiatric:         Mood and Affect: Mood and affect normal.         Judgment: Judgment normal.       Ortho Exam      LEFT " KNEE: Tender medial joint line. Calf soft. Sensation grossly intact. Neurovascular intact.  Good strength to hamstrings, quadriceps, dorsiflexors and plantar flexors. Stable to varus/valgus stress. Stable anterior and posterior drawer. Skin intact. No swelling, skin discoloration or atrophy. Full extension. Flexion to 120 degrees.       Procedures        Imaging Results (Most Recent)     Procedure Component Value Units Date/Time    XR Knee 3 View Left [[id number]] Resulted: 06/03/22 1329     Updated: 06/03/22 1330           Result Review :     X-Ray Report:  Left knee(s) X-Ray  Indication: Evaluation of left knee pain   AP, Lateral and Standing view(s)  Findings: Joint space narrowing most prominent in the medial compartment. No fractures or dislocation.   Prior studies available for comparison: no     Assessment and Plan     Diagnoses and all orders for this visit:    1. Left knee pain, unspecified chronicity (Primary)  -     XR Knee 3 View Left    2. Primary osteoarthritis of left knee        Patient opted to get approval for the Zilretta injection. We will work on this.     Call or return if worsening symptoms.    Follow Up     Follow-up after Zilretta approval.       Patient was given instructions and counseling regarding her condition or for health maintenance advice. Please see specific information pulled into the AVS if appropriate.     Scribed for Carl Swartz MD by Duyen Lockwood.  06/03/22   13:29 EDT    I have personally performed the services described in this document as scribed by the above individual and it is both accurate and complete. Carl Swartz MD 06/03/22

## 2022-06-13 ENCOUNTER — LAB (OUTPATIENT)
Dept: LAB | Facility: HOSPITAL | Age: 57
End: 2022-06-13

## 2022-06-13 DIAGNOSIS — D50.9 IRON DEFICIENCY ANEMIA, UNSPECIFIED IRON DEFICIENCY ANEMIA TYPE: ICD-10-CM

## 2022-06-13 LAB
FERRITIN SERPL-MCNC: 170.6 NG/ML (ref 13–150)
IRON 24H UR-MRATE: 55 MCG/DL (ref 37–145)
IRON SATN MFR SERPL: 16 % (ref 20–50)
TIBC SERPL-MCNC: 340 MCG/DL (ref 298–536)
TRANSFERRIN SERPL-MCNC: 228 MG/DL (ref 200–360)

## 2022-06-13 PROCEDURE — 83540 ASSAY OF IRON: CPT | Performed by: NURSE PRACTITIONER

## 2022-06-13 PROCEDURE — 84466 ASSAY OF TRANSFERRIN: CPT | Performed by: NURSE PRACTITIONER

## 2022-06-13 PROCEDURE — 36415 COLL VENOUS BLD VENIPUNCTURE: CPT

## 2022-06-13 PROCEDURE — 82728 ASSAY OF FERRITIN: CPT | Performed by: NURSE PRACTITIONER

## 2022-06-16 ENCOUNTER — TELEPHONE (OUTPATIENT)
Dept: ORTHOPEDIC SURGERY | Facility: CLINIC | Age: 57
End: 2022-06-16

## 2022-06-16 NOTE — TELEPHONE ENCOUNTER
Insurance denied the Zilretta injections.  Denial scanned in chart.  Can make appointment to address any other options if patient wants.

## 2022-06-16 NOTE — TELEPHONE ENCOUNTER
Caller: Amber Poole    Relationship to patient: Self    Best call back number: [phone]    Patient is needing: PATIENT CALLING TO CHECK STATUS OF APPROVAL FOR KNEE INJECTIONS. PLEASE GIVE HER A CALL AT THE NUMBER ABOVE.

## 2022-06-27 ENCOUNTER — HOSPITAL ENCOUNTER (OUTPATIENT)
Dept: MAMMOGRAPHY | Facility: HOSPITAL | Age: 57
Discharge: HOME | End: 2022-06-27
Admitting: OBSTETRICS & GYNECOLOGY

## 2022-06-27 ENCOUNTER — OFFICE VISIT (OUTPATIENT)
Dept: ORTHOPEDIC SURGERY | Facility: CLINIC | Age: 57
End: 2022-06-27

## 2022-06-27 VITALS — HEART RATE: 80 BPM | HEIGHT: 67 IN | WEIGHT: 203.6 LBS | OXYGEN SATURATION: 97 % | BODY MASS INDEX: 31.96 KG/M2

## 2022-06-27 DIAGNOSIS — Z12.31 VISIT FOR SCREENING MAMMOGRAM: ICD-10-CM

## 2022-06-27 DIAGNOSIS — M17.12 PRIMARY OSTEOARTHRITIS OF LEFT KNEE: Primary | ICD-10-CM

## 2022-06-27 DIAGNOSIS — M19.90 ARTHRITIS: ICD-10-CM

## 2022-06-27 PROCEDURE — 77067 SCR MAMMO BI INCL CAD: CPT

## 2022-06-27 PROCEDURE — 20610 DRAIN/INJ JOINT/BURSA W/O US: CPT | Performed by: ORTHOPAEDIC SURGERY

## 2022-06-27 PROCEDURE — 77063 BREAST TOMOSYNTHESIS BI: CPT

## 2022-06-27 RX ORDER — TRIAMCINOLONE ACETONIDE 40 MG/ML
40 INJECTION, SUSPENSION INTRA-ARTICULAR; INTRAMUSCULAR
Status: COMPLETED | OUTPATIENT
Start: 2022-06-27 | End: 2022-06-27

## 2022-06-27 RX ORDER — LIDOCAINE HYDROCHLORIDE 10 MG/ML
9 INJECTION, SOLUTION INFILTRATION; PERINEURAL
Status: COMPLETED | OUTPATIENT
Start: 2022-06-27 | End: 2022-06-27

## 2022-06-27 RX ADMIN — LIDOCAINE HYDROCHLORIDE 9 ML: 10 INJECTION, SOLUTION INFILTRATION; PERINEURAL at 14:52

## 2022-06-27 RX ADMIN — TRIAMCINOLONE ACETONIDE 40 MG: 40 INJECTION, SUSPENSION INTRA-ARTICULAR; INTRAMUSCULAR at 14:52

## 2022-06-27 NOTE — PROGRESS NOTES
"Chief Complaint  Follow-up of the Left Knee     Subjective      Abmer Poole presents to [facility] for follow up evaluation of the left knee. The patient has been treating her left knee osteoarthritis conservatively with injections. She has gotten relief with injections. Her insurance denied her Zilretta injection. She got a Zilretta injection in March 2021 with significant relief. She is requesting a steroid injection today.      Allergies   Allergen Reactions   • Codeine Anaphylaxis and Rash   • Penicillins Rash        Social History     Socioeconomic History   • Marital status:    Tobacco Use   • Smoking status: Never Smoker   • Smokeless tobacco: Never Used   Substance and Sexual Activity   • Alcohol use: Never   • Drug use: Never        Review of Systems     Objective   Vital Signs:   Pulse 80   Ht 170.2 cm (67\")   Wt 92.4 kg (203 lb 9.6 oz)   SpO2 97%   BMI 31.89 kg/m²       Physical Exam  Constitutional:       Appearance: Normal appearance. Patient is well-developed and normal weight.   HENT:      Head: Normocephalic.      Right Ear: Hearing and external ear normal.      Left Ear: Hearing and external ear normal.      Nose: Nose normal.   Eyes:      Conjunctiva/sclera: Conjunctivae normal.   Cardiovascular:      Rate and Rhythm: Normal rate.   Pulmonary:      Effort: Pulmonary effort is normal.      Breath sounds: No wheezing or rales.   Abdominal:      Palpations: Abdomen is soft.      Tenderness: There is no abdominal tenderness.   Musculoskeletal:      Cervical back: Normal range of motion.   Skin:     Findings: No rash.   Neurological:      Mental Status: Patient  is alert and oriented to person, place, and time.   Psychiatric:         Mood and Affect: Mood and affect normal.         Judgment: Judgment normal.       Ortho Exam      LEFT KNEE: Tender medial joint line. Calf soft. Sensation grossly intact. Neurovascular intact.  Good strength to hamstrings, " quadriceps, dorsiflexors and plantar flexors. Stable to varus/valgus stress. Stable anterior and posterior drawer. Skin intact. No swelling, skin discoloration or atrophy. Full extension. Flexion to 120 degrees.          Left knee : L knee  Date/Time: 6/27/2022 2:52 PM  Consent given by: patient  Site marked: site marked  Timeout: Immediately prior to procedure a time out was called to verify the correct patient, procedure, equipment, support staff and site/side marked as required   Supporting Documentation  Indications: pain   Procedure Details  Location: knee - L knee  Needle gauge: 21G.  Medications administered: 9 mL lidocaine 1 %; 40 mg triamcinolone acetonide 40 MG/ML  Patient tolerance: patient tolerated the procedure well with no immediate complications              Imaging Results (Most Recent)     None           Result Review :       XR Knee 3 View Left    Result Date: 6/3/2022  Narrative: X-Ray Report: Left knee(s) X-Ray Indication: Evaluation of left knee pain AP, Lateral and Standing view(s) Findings: Joint space narrowing most prominent in the medial compartment. No fractures or dislocation. Prior studies available for comparison: no     Mammo Screening Digital Tomosynthesis Bilateral With CAD    Result Date: 6/27/2022  Narrative: PROCEDURE: MAMMO SCREENING DIGITAL TOMOSYNTHESIS BILATERAL W CAD  COMPARISON: [facility], DIGITAL SCREENING W/CAD, 3/01/2016, 9:30.  [facility], DIGITAL SCREENING W/CAD, 2/11/2015, 9:47.  [facility], , MAMMO SCREENING BILATERAL W/CAD, 12/21/2012, 9:41.  [facility], DIGITAL SCREENING W/CAD, 11/17/2009, 10:45.  [facility], MAMMO SCREENING BILATERAL W/CAD, 11/17/2009, 10:45.  [facility], DIGITAL SCREENING W/CAD, 9/12/2008, 12:02.  [facility], MAMMO SCREENING BILATERAL W/CAD, 9/12/2008, 12:02.  [facility], HD SCREENING BILATERAL NO  CAD, 8/16/1990, 2:57.  [facility], , MAMMO SCREENING BILATERAL, 8/16/1990, 2:57.  [facility], [place], DIG SCREENING BILAT MOR W 3D ELAINA, 11/27/2019, 8:40.  [facility], , DIG SCREENING BILAT MOR W 3D ELAINA, 8/28/2018, 9:21.  [facility], , DIGITAL SCREENING W/CAD, 6/05/2017, 9:30.  [facility], , MAMMO SCREENING DIGITAL TOMOSYNTHESIS BILATERAL W CAD, 6/25/2021, 8:19.  VIEWS:  BILATERAL CC AND MLO VIEWS WERE OBTAINED UTILIZING 3D TOMOSYNTHESIS AND R2 CAD SOFTWARE  INDICATIONS: SCREENING  FINDINGS:  No suspicious mass, area of architectural distortion or suspicious microcalcification is identified.      Impression:  Benign mammogram. Suggest routine mammographic screening.  RECOMMENDATION(S):  ROUTINE MAMMOGRAM AND CLINICAL EVALUATION IN 12 MONTHS.   BIRADS:  DIAGNOSTIC CATEGORY 2--BENIGN FINDING   BREAST COMPOSITION: Scattered areas fibroglandular density.  PLEASE NOTE:  A NORMAL MAMMOGRAM DOES NOT EXCLUDE THE POSSIBILITY OF BREAST CANCER. ANY CLINICALLY SUSPICIOUS PALPABLE LUMP SHOULD BE BIOPSIED.      MEGAN VALDEZ MD       Electronically Signed and Approved By: MEGAN VALDEZ MD on 6/27/2022 at 9:48                      Assessment and Plan     Diagnoses and all orders for this visit:    1. Primary osteoarthritis of left knee (Primary)        Discussed the treatment plan with the patient.  Discussed the risks and benefits of a left knee steroid injection. The patient expressed understanding and wished to proceed. She tolerated the injection well.     Call or return if worsening symptoms.    Follow Up     PRN      Patient was given instructions and counseling regarding her condition or for health maintenance advice. Please see specific information pulled into the AVS if appropriate.     Scribed for Carl Swartz MD by Linda Borden.  06/27/22   14:31 EDT    I have personally performed the services described  in this document as scribed by the above individual and it is both accurate and complete. Carl Swartz MD 06/27/22

## 2022-08-01 ENCOUNTER — OFFICE VISIT (OUTPATIENT)
Dept: ORTHOPEDIC SURGERY | Facility: CLINIC | Age: 57
End: 2022-08-01

## 2022-08-01 VITALS — HEIGHT: 67 IN | BODY MASS INDEX: 31.86 KG/M2 | WEIGHT: 203 LBS

## 2022-08-01 DIAGNOSIS — M17.12 PRIMARY OSTEOARTHRITIS OF LEFT KNEE: Primary | ICD-10-CM

## 2022-08-01 PROCEDURE — 20610 DRAIN/INJ JOINT/BURSA W/O US: CPT | Performed by: ORTHOPAEDIC SURGERY

## 2022-08-01 RX ORDER — LIDOCAINE HYDROCHLORIDE 10 MG/ML
5 INJECTION, SOLUTION INFILTRATION; PERINEURAL
Status: COMPLETED | OUTPATIENT
Start: 2022-08-01 | End: 2022-08-01

## 2022-08-01 RX ADMIN — LIDOCAINE HYDROCHLORIDE 5 ML: 10 INJECTION, SOLUTION INFILTRATION; PERINEURAL at 11:12

## 2022-08-01 NOTE — PROGRESS NOTES
"Chief Complaint  Follow-up of the Left Knee     Subjective      Amber Poole presents to [facility] for a follow-up of left knee. She has left knee osteoarthritis that is being treated conservatively. She has had zilretta injection approved and is present today to receive this. She denies any injury or trauma to the knee.     Allergies   Allergen Reactions   • Codeine Anaphylaxis and Rash   • Penicillins Rash        Social History     Socioeconomic History   • Marital status:    Tobacco Use   • Smoking status: Never Smoker   • Smokeless tobacco: Never Used   Vaping Use   • Vaping Use: Never used   Substance and Sexual Activity   • Alcohol use: Never   • Drug use: Never        Review of Systems     Objective   Vital Signs:   Ht 170.2 cm (67\")   Wt 92.1 kg (203 lb)   BMI 31.79 kg/m²       Physical Exam  Constitutional:       Appearance: Normal appearance. Patient is well-developed and normal weight.   HENT:      Head: Normocephalic.      Right Ear: Hearing and external ear normal.      Left Ear: Hearing and external ear normal.      Nose: Nose normal.   Eyes:      Conjunctiva/sclera: Conjunctivae normal.   Cardiovascular:      Rate and Rhythm: Normal rate.   Pulmonary:      Effort: Pulmonary effort is normal.      Breath sounds: No wheezing or rales.   Abdominal:      Palpations: Abdomen is soft.      Tenderness: There is no abdominal tenderness.   Musculoskeletal:      Cervical back: Normal range of motion.   Skin:     Findings: No rash.   Neurological:      Mental Status: Patient  is alert and oriented to person, place, and time.   Psychiatric:         Mood and Affect: Mood and affect normal.         Judgment: Judgment normal.       Ortho Exam      LEFT KNEE: Flexion to 120 degrees. Full extension. Tender medial joint line. Calf supple, non-tender, no signs of DVT. Dorsal Pedal Pulse 2+, posterior tibialis pulse 2+. Skin intact. No swelling, skin discoloration or " atrophy.       Large Joint Arthrocentesis: L knee  Date/Time: 8/1/2022 11:12 AM  Consent given by: patient  Site marked: site marked  Timeout: Immediately prior to procedure a time out was called to verify the correct patient, procedure, equipment, support staff and site/side marked as required   Supporting Documentation  Indications: pain   Procedure Details  Location: knee - L knee  Preparation: Patient was prepped and draped in the usual sterile fashion  Needle size: 22 G  Medications administered: 32 mg Triamcinolone Acetonide 32 MG; 5 mL lidocaine 1 %  Patient tolerance: patient tolerated the procedure well with no immediate complications              Imaging Results (Most Recent)     None           Result Review :         No results found.           Assessment and Plan     Diagnoses and all orders for this visit:    1. Primary osteoarthritis of left knee (Primary)        Left knee zilretta injection given, patient tolerated this well.     Call or return if worsening symptoms.    Follow Up     PRN.       Patient was given instructions and counseling regarding her condition or for health maintenance advice. Please see specific information pulled into the AVS if appropriate.     Scribed for Carl Swartz MD by Duyen Lockwood.  08/01/22   09:29 EDT    I have personally performed the services described in this document as scribed by the above individual and it is both accurate and complete. Carl Swartz MD 08/01/22

## 2022-08-02 ENCOUNTER — LAB (OUTPATIENT)
Dept: LAB | Facility: HOSPITAL | Age: 57
End: 2022-08-02

## 2022-08-02 ENCOUNTER — TRANSCRIBE ORDERS (OUTPATIENT)
Dept: LAB | Facility: HOSPITAL | Age: 57
End: 2022-08-02

## 2022-08-02 DIAGNOSIS — E03.9 ADULT HYPOTHYROIDISM: Primary | ICD-10-CM

## 2022-08-02 DIAGNOSIS — E03.9 ADULT HYPOTHYROIDISM: ICD-10-CM

## 2022-08-02 LAB
T4 FREE SERPL-MCNC: 1.05 NG/DL (ref 0.93–1.7)
TSH SERPL DL<=0.05 MIU/L-ACNC: 1.95 UIU/ML (ref 0.27–4.2)

## 2022-08-02 PROCEDURE — 84439 ASSAY OF FREE THYROXINE: CPT

## 2022-08-02 PROCEDURE — 83921 ORGANIC ACID SINGLE QUANT: CPT

## 2022-08-02 PROCEDURE — 84481 FREE ASSAY (FT-3): CPT

## 2022-08-02 PROCEDURE — 84443 ASSAY THYROID STIM HORMONE: CPT

## 2022-08-02 PROCEDURE — 36415 COLL VENOUS BLD VENIPUNCTURE: CPT

## 2022-08-03 DIAGNOSIS — J30.2 SEASONAL ALLERGIC RHINITIS, UNSPECIFIED TRIGGER: ICD-10-CM

## 2022-08-03 DIAGNOSIS — R05.9 COUGH: ICD-10-CM

## 2022-08-03 LAB — T3FREE SERPL-MCNC: 2.49 PG/ML (ref 2–4.4)

## 2022-08-03 RX ORDER — MONTELUKAST SODIUM 10 MG/1
TABLET ORAL
Qty: 90 TABLET | Refills: 1 | Status: SHIPPED | OUTPATIENT
Start: 2022-08-03 | End: 2023-01-30

## 2022-08-04 ENCOUNTER — OFFICE VISIT (OUTPATIENT)
Dept: FAMILY MEDICINE CLINIC | Facility: CLINIC | Age: 57
End: 2022-08-04

## 2022-08-04 VITALS
RESPIRATION RATE: 12 BRPM | DIASTOLIC BLOOD PRESSURE: 90 MMHG | TEMPERATURE: 97.2 F | WEIGHT: 193.3 LBS | OXYGEN SATURATION: 98 % | HEIGHT: 67 IN | BODY MASS INDEX: 30.34 KG/M2 | SYSTOLIC BLOOD PRESSURE: 120 MMHG | HEART RATE: 73 BPM

## 2022-08-04 DIAGNOSIS — F51.01 PRIMARY INSOMNIA: ICD-10-CM

## 2022-08-04 DIAGNOSIS — N89.8 VAGINAL DRYNESS: ICD-10-CM

## 2022-08-04 DIAGNOSIS — L30.9 DERMATITIS: Primary | ICD-10-CM

## 2022-08-04 PROCEDURE — 99214 OFFICE O/P EST MOD 30 MIN: CPT | Performed by: NURSE PRACTITIONER

## 2022-08-04 RX ORDER — ALCLOMETASONE DIPROPIONATE 0.5 MG/G
1 CREAM TOPICAL 2 TIMES DAILY
COMMUNITY
End: 2022-08-04 | Stop reason: SDUPTHER

## 2022-08-04 RX ORDER — ALCLOMETASONE DIPROPIONATE 0.5 MG/G
1 CREAM TOPICAL 2 TIMES DAILY
Qty: 60 G | Refills: 0 | Status: SHIPPED | OUTPATIENT
Start: 2022-08-04 | End: 2023-03-07 | Stop reason: SDUPTHER

## 2022-08-04 RX ORDER — TRAZODONE HYDROCHLORIDE 50 MG/1
TABLET ORAL
Qty: 30 TABLET | Refills: 2 | Status: SHIPPED | OUTPATIENT
Start: 2022-08-04 | End: 2022-09-13 | Stop reason: SDUPTHER

## 2022-08-04 RX ORDER — CHOLECALCIFEROL (VITAMIN D3) 125 MCG
10 CAPSULE ORAL NIGHTLY PRN
COMMUNITY
End: 2023-03-07

## 2022-08-04 NOTE — PROGRESS NOTES
Answers for HPI/ROS submitted by the patient on 8/1/2022  Please describe your symptoms.: Can’t go to sleep. I take 10mg melatoinat bedtime.  Have you had these symptoms before?: Yes  How long have you been having these symptoms?: Greater than 2 weeks  Please list any medications you are currently taking for this condition.: Melatonin  What is the primary reason for your visit?: Other    Chief Complaint  Insomnia and Vaginal Itching (States dry)    Subjective          Amber Poole presents to [facility]  History of Present Illness  She will get flaky in the right ear and derm rx'd it and she doesn't use it often.  One tube will last 2 months or longer.  It is only the right one.  No bleeding in the ears.    She is having issues with sleep and she is taking 10mg melatonin.  She went to see Dr MONTGOMERY--and her levels are all normal.  She was given cytomel twice a day--but he told her to take 10mg total in the AM.  He thinks that it poss causing the issues with sleeping.  She was awake every hour on the hour.  She can get more done at 9:30pm to 2am and then she is with the [employer] during the day.  She has been rubin at night.   She is also having vaginal dryness in Nov/Dec with Dr Shaw office--she doesn't go back to him until the end of the year.  She has been using estrace from their office--she is using once and twice a year.  No discharge at this time.    Allergies  Codeine and Penicillins    Social History     Tobacco Use   • Smoking status: Never Smoker   • Smokeless tobacco: Never Used   Vaping Use   • Vaping Use: Never used   Substance Use Topics   • Alcohol use: Never   • Drug use: Never       Family History   Problem Relation Age of Onset   • Diabetes Mother    • Kidney nephrosis Mother    • Heart disease Father    • Vision loss Father    • Macular degeneration Father    • Kidney nephrosis Maternal Grandmother    • Other Maternal Grandmother         BLADDER CALCULUS  "  • Liver cancer Other         UNSPECIFIED   • Lung cancer Other         UNSPECIFIED   • Cancer Daughter    • Leukemia Daughter    • Leukemia Son    • Macular degeneration Sister         Health Maintenance Due   Topic Date Due   • ANNUAL PHYSICAL  Never done   • ZOSTER VACCINE (1 of 2) Never done   • HEPATITIS C SCREENING  Never done        Immunization History   Administered Date(s) Administered   • Influenza, Unspecified 10/01/2020   • Tdap 08/06/2018       Review of Systems   Constitutional: Negative for fatigue.   Respiratory: Negative for cough and shortness of breath.    Cardiovascular: Negative for chest pain.   Gastrointestinal: Negative for diarrhea, nausea and vomiting.        Objective       Vitals:    08/04/22 0836   BP: 120/90   Pulse: 73   Resp: 12   Temp: 97.2 °F (36.2 °C)   SpO2: 98%   Weight: 87.7 kg (193 lb 4.8 oz)   Height: 170.2 cm (67\")       Body mass index is 30.28 kg/m².         Physical Exam  Vitals reviewed.   Constitutional:       Appearance: Normal appearance. She is well-developed.   HENT:      Head: Normocephalic.   Pulmonary:      Effort: Pulmonary effort is normal.   Skin:     General: Skin is warm and dry.      Findings: No bruising.   Neurological:      General: No focal deficit present.      Mental Status: She is alert and oriented to person, place, and time.      Cranial Nerves: No cranial nerve deficit.      Motor: No weakness.   Psychiatric:         Mood and Affect: Mood and affect normal.         Behavior: Behavior normal.         Thought Content: Thought content normal.         Judgment: Judgment normal.             Result Review :     The following data was reviewed by: SHAILESH Colvin on 08/04/2022:                     Assessment and Plan      Diagnoses and all orders for this visit:    1. Dermatitis (Primary)  -     alclometasone (ACLOVATE) 0.05 % cream; Apply 1 application topically to the appropriate area as directed 2 (Two) Times a Day.  Dispense: 60 g; " Refill: 0    2. Primary insomnia  -     traZODone (DESYREL) 50 MG tablet; 1/2 to 1 tab at bedtime  Dispense: 30 tablet; Refill: 2    3. Vaginal dryness  -     alclometasone (ACLOVATE) 0.05 % cream; Apply 1 application topically to the appropriate area as directed 2 (Two) Times a Day.  Dispense: 60 g; Refill: 0            Follow Up     Return if symptoms worsen or fail to improve.  We will fill cream for the ear.  Check with Dr Chauhan's office in regards to the vaginal dryness.  Try replens for dryness.  She will take 1/2 of trazadone for the sleep and keep me posted.  She will try up to 1 tab with melatonin if needed.  Patient was given instructions and counseling regarding her condition or for health maintenance advice. Please see specific information pulled into the AVS if appropriate.         Faith Ramos, SHAILESH  08/04/2022

## 2022-08-05 LAB — METHYLMALONATE SERPL-SCNC: 231 NMOL/L (ref 0–378)

## 2022-09-13 ENCOUNTER — OFFICE VISIT (OUTPATIENT)
Dept: FAMILY MEDICINE CLINIC | Facility: CLINIC | Age: 57
End: 2022-09-13

## 2022-09-13 VITALS
HEIGHT: 67 IN | BODY MASS INDEX: 30.83 KG/M2 | RESPIRATION RATE: 20 BRPM | DIASTOLIC BLOOD PRESSURE: 80 MMHG | OXYGEN SATURATION: 98 % | WEIGHT: 196.4 LBS | HEART RATE: 73 BPM | TEMPERATURE: 97.9 F | SYSTOLIC BLOOD PRESSURE: 120 MMHG

## 2022-09-13 DIAGNOSIS — R19.8 STRAINING WITH STOOLS: ICD-10-CM

## 2022-09-13 DIAGNOSIS — J30.89 SEASONAL ALLERGIC RHINITIS DUE TO OTHER ALLERGIC TRIGGER: ICD-10-CM

## 2022-09-13 DIAGNOSIS — R23.2 HOT FLASHES: ICD-10-CM

## 2022-09-13 DIAGNOSIS — D64.9 ANEMIA, UNSPECIFIED TYPE: Primary | ICD-10-CM

## 2022-09-13 DIAGNOSIS — M19.90 ARTHRITIS: ICD-10-CM

## 2022-09-13 DIAGNOSIS — Z11.59 ENCOUNTER FOR HEPATITIS C SCREENING TEST FOR LOW RISK PATIENT: ICD-10-CM

## 2022-09-13 DIAGNOSIS — F51.01 PRIMARY INSOMNIA: ICD-10-CM

## 2022-09-13 LAB
ALBUMIN SERPL-MCNC: 4.3 G/DL (ref 3.5–5.2)
ALBUMIN/GLOB SERPL: 2 G/DL
ALP SERPL-CCNC: 90 U/L (ref 39–117)
ALT SERPL W P-5'-P-CCNC: 15 U/L (ref 1–33)
ANION GAP SERPL CALCULATED.3IONS-SCNC: 12.1 MMOL/L (ref 5–15)
AST SERPL-CCNC: 13 U/L (ref 1–32)
BASOPHILS # BLD AUTO: 0.05 10*3/MM3 (ref 0–0.2)
BASOPHILS NFR BLD AUTO: 0.6 % (ref 0–1.5)
BILIRUB SERPL-MCNC: 0.3 MG/DL (ref 0–1.2)
BUN SERPL-MCNC: 17 MG/DL (ref 6–20)
BUN/CREAT SERPL: 23 (ref 7–25)
CALCIUM SPEC-SCNC: 9.6 MG/DL (ref 8.6–10.5)
CHLORIDE SERPL-SCNC: 104 MMOL/L (ref 98–107)
CHOLEST SERPL-MCNC: 192 MG/DL (ref 0–200)
CO2 SERPL-SCNC: 24.9 MMOL/L (ref 22–29)
CREAT SERPL-MCNC: 0.74 MG/DL (ref 0.57–1)
DEPRECATED RDW RBC AUTO: 40 FL (ref 37–54)
EGFRCR SERPLBLD CKD-EPI 2021: 96.3 ML/MIN/1.73
EOSINOPHIL # BLD AUTO: 0.3 10*3/MM3 (ref 0–0.4)
EOSINOPHIL NFR BLD AUTO: 3.7 % (ref 0.3–6.2)
ERYTHROCYTE [DISTWIDTH] IN BLOOD BY AUTOMATED COUNT: 12.6 % (ref 12.3–15.4)
FERRITIN SERPL-MCNC: 81.1 NG/ML (ref 13–150)
GLOBULIN UR ELPH-MCNC: 2.1 GM/DL
GLUCOSE SERPL-MCNC: 97 MG/DL (ref 65–99)
HCT VFR BLD AUTO: 40.6 % (ref 34–46.6)
HCV AB SER DONR QL: NORMAL
HDLC SERPL-MCNC: 42 MG/DL (ref 40–60)
HGB BLD-MCNC: 13.6 G/DL (ref 12–15.9)
IMM GRANULOCYTES # BLD AUTO: 0.03 10*3/MM3 (ref 0–0.05)
IMM GRANULOCYTES NFR BLD AUTO: 0.4 % (ref 0–0.5)
IRON 24H UR-MRATE: 84 MCG/DL (ref 37–145)
IRON SATN MFR SERPL: 22 % (ref 20–50)
LDLC SERPL CALC-MCNC: 121 MG/DL (ref 0–100)
LDLC/HDLC SERPL: 2.8 {RATIO}
LYMPHOCYTES # BLD AUTO: 1.82 10*3/MM3 (ref 0.7–3.1)
LYMPHOCYTES NFR BLD AUTO: 22.3 % (ref 19.6–45.3)
MCH RBC QN AUTO: 28.8 PG (ref 26.6–33)
MCHC RBC AUTO-ENTMCNC: 33.5 G/DL (ref 31.5–35.7)
MCV RBC AUTO: 85.8 FL (ref 79–97)
MONOCYTES # BLD AUTO: 0.57 10*3/MM3 (ref 0.1–0.9)
MONOCYTES NFR BLD AUTO: 7 % (ref 5–12)
NEUTROPHILS NFR BLD AUTO: 5.4 10*3/MM3 (ref 1.7–7)
NEUTROPHILS NFR BLD AUTO: 66 % (ref 42.7–76)
NRBC BLD AUTO-RTO: 0 /100 WBC (ref 0–0.2)
PLATELET # BLD AUTO: 258 10*3/MM3 (ref 140–450)
PMV BLD AUTO: 11.6 FL (ref 6–12)
POTASSIUM SERPL-SCNC: 4.5 MMOL/L (ref 3.5–5.2)
PROT SERPL-MCNC: 6.4 G/DL (ref 6–8.5)
RBC # BLD AUTO: 4.73 10*6/MM3 (ref 3.77–5.28)
SODIUM SERPL-SCNC: 141 MMOL/L (ref 136–145)
TIBC SERPL-MCNC: 375 MCG/DL (ref 298–536)
TRANSFERRIN SERPL-MCNC: 252 MG/DL (ref 200–360)
TRIGL SERPL-MCNC: 163 MG/DL (ref 0–150)
VLDLC SERPL-MCNC: 29 MG/DL (ref 5–40)
WBC NRBC COR # BLD: 8.17 10*3/MM3 (ref 3.4–10.8)

## 2022-09-13 PROCEDURE — 85025 COMPLETE CBC W/AUTO DIFF WBC: CPT | Performed by: NURSE PRACTITIONER

## 2022-09-13 PROCEDURE — 84466 ASSAY OF TRANSFERRIN: CPT | Performed by: NURSE PRACTITIONER

## 2022-09-13 PROCEDURE — 82728 ASSAY OF FERRITIN: CPT | Performed by: NURSE PRACTITIONER

## 2022-09-13 PROCEDURE — 86803 HEPATITIS C AB TEST: CPT | Performed by: NURSE PRACTITIONER

## 2022-09-13 PROCEDURE — 99214 OFFICE O/P EST MOD 30 MIN: CPT | Performed by: NURSE PRACTITIONER

## 2022-09-13 PROCEDURE — 83540 ASSAY OF IRON: CPT | Performed by: NURSE PRACTITIONER

## 2022-09-13 PROCEDURE — 80053 COMPREHEN METABOLIC PANEL: CPT | Performed by: NURSE PRACTITIONER

## 2022-09-13 PROCEDURE — 80061 LIPID PANEL: CPT | Performed by: NURSE PRACTITIONER

## 2022-09-13 RX ORDER — ESTRADIOL 0.1 MG/G
CREAM VAGINAL
COMMUNITY
Start: 2022-09-09

## 2022-09-13 RX ORDER — FERROUS SULFATE 325(65) MG
325 TABLET ORAL EVERY OTHER DAY
Qty: 90 TABLET | Refills: 1 | Status: SHIPPED | OUTPATIENT
Start: 2022-09-13 | End: 2023-03-07

## 2022-09-13 RX ORDER — NORETHINDRONE ACETATE AND ETHINYL ESTRADIOL 1; 5 MG/1; UG/1
1 TABLET ORAL DAILY
COMMUNITY
Start: 2022-09-09

## 2022-09-13 RX ORDER — LORATADINE 10 MG/1
10 TABLET ORAL DAILY
Qty: 90 TABLET | Refills: 1 | Status: SHIPPED | OUTPATIENT
Start: 2022-09-13 | End: 2023-03-07 | Stop reason: SDUPTHER

## 2022-09-13 RX ORDER — DOCUSATE CALCIUM 240 MG
240 CAPSULE ORAL DAILY PRN
Qty: 90 CAPSULE | Refills: 1 | Status: SHIPPED | OUTPATIENT
Start: 2022-09-13 | End: 2023-03-07

## 2022-09-13 RX ORDER — TRAZODONE HYDROCHLORIDE 50 MG/1
TABLET ORAL
Qty: 30 TABLET | Refills: 2 | Status: SHIPPED | OUTPATIENT
Start: 2022-09-13 | End: 2022-10-29 | Stop reason: SDUPTHER

## 2022-09-13 RX ORDER — DOCUSATE SODIUM 100 MG/1
100 CAPSULE, LIQUID FILLED ORAL NIGHTLY
Qty: 90 CAPSULE | Refills: 1 | Status: SHIPPED | OUTPATIENT
Start: 2022-09-13 | End: 2023-03-07 | Stop reason: SDUPTHER

## 2022-09-13 RX ORDER — DOCUSATE SODIUM 100 MG/1
100 CAPSULE, LIQUID FILLED ORAL NIGHTLY
COMMUNITY
End: 2022-09-13 | Stop reason: SDUPTHER

## 2022-09-13 RX ORDER — CELECOXIB 100 MG/1
100 CAPSULE ORAL 2 TIMES DAILY
Qty: 180 CAPSULE | Refills: 1 | Status: SHIPPED | OUTPATIENT
Start: 2022-09-13 | End: 2023-03-07 | Stop reason: SDUPTHER

## 2022-09-13 RX ORDER — ESCITALOPRAM OXALATE 10 MG/1
10 TABLET ORAL DAILY
Qty: 30 TABLET | Refills: 5 | Status: SHIPPED | OUTPATIENT
Start: 2022-09-13 | End: 2023-03-07 | Stop reason: SDUPTHER

## 2022-09-13 NOTE — PROGRESS NOTES
Answers for HPI/ROS submitted by the patient on 9/1/2022  Please describe your symptoms.: Med refills and iron check  Have you had these symptoms before?: Yes  How long have you been having these symptoms?: Greater than 2 weeks  What is the primary reason for your visit?: Other    Chief Complaint  Anemia, Hypothyroidism, and Depression    Subjective          Amber Poole presents to [facility]  History of Present Illness   Anemia:  She is taking iron every other day.  She does take her Vit D.  She is doing her colace daily and then the 240mg on days of the iron.  Hot Flashes:  The lexapro is doing good and she is still doing the hormones.  Allergies:  She is taking her claritin and singular.  Arthritis:  She is taking the celebrex twice a day.  Insomnia: She is doing well with her trazodone.  She takes 1/2-1 and is sleeping better.  GYN also placed her on 2 different hormones and that has also helped.  Allergies  Codeine and Penicillins    Social History     Tobacco Use   • Smoking status: Never Smoker   • Smokeless tobacco: Never Used   Vaping Use   • Vaping Use: Never used   Substance Use Topics   • Alcohol use: Never   • Drug use: Never       Family History   Problem Relation Age of Onset   • Diabetes Mother    • Kidney nephrosis Mother    • Heart disease Father    • Vision loss Father    • Macular degeneration Father    • Kidney nephrosis Maternal Grandmother    • Other Maternal Grandmother         BLADDER CALCULUS   • Liver cancer Other         UNSPECIFIED   • Lung cancer Other         UNSPECIFIED   • Cancer Daughter    • Leukemia Daughter    • Leukemia Son    • Macular degeneration Sister         Health Maintenance Due   Topic Date Due   • COVID-19 Vaccine (1) Never done   • ANNUAL PHYSICAL  Never done   • ZOSTER VACCINE (1 of 2) Never done   • HEPATITIS C SCREENING  Never done        Immunization History   Administered Date(s) Administered   • Influenza, Unspecified  "10/01/2020   • Tdap 08/06/2018       Review of Systems   Constitutional: Negative for fatigue.   Respiratory: Negative for cough and shortness of breath.    Cardiovascular: Negative for chest pain.   Gastrointestinal: Negative for diarrhea, nausea and vomiting.        Objective       Vitals:    09/13/22 0836   BP: 120/80   Pulse: 73   Resp: 20   Temp: 97.9 °F (36.6 °C)   SpO2: 98%   Weight: 89.1 kg (196 lb 6.4 oz)   Height: 170.2 cm (67\")       Body mass index is 30.76 kg/m².         Physical Exam  Vitals reviewed.   Constitutional:       Appearance: Normal appearance. She is well-developed.   Cardiovascular:      Rate and Rhythm: Normal rate and regular rhythm.      Heart sounds: Normal heart sounds. No murmur heard.  Pulmonary:      Effort: Pulmonary effort is normal.      Breath sounds: Normal breath sounds.   Neurological:      Mental Status: She is alert and oriented to person, place, and time.      Cranial Nerves: No cranial nerve deficit.      Motor: No weakness.   Psychiatric:         Mood and Affect: Mood and affect normal.             Result Review :     The following data was reviewed by: SHAILESH Colvin on 09/13/2022:    Common labs    Common Labsle 11/11/21 1/13/22 1/13/22 3/2/22     1148 1148    Glucose   89    BUN   11    Creatinine   0.74    eGFR Non African Am   82    Sodium   140    Potassium   4.5    Chloride   105    Calcium   9.7    Albumin   4.30    Total Bilirubin   0.4    Alkaline Phosphatase   93    AST (SGOT)   17    ALT (SGPT)   16    WBC 6.47 10.15  8.84   Hemoglobin 13.0 13.8  14.1   Hematocrit 37.9 40.7  43.1   Platelets 253 285  274                              Assessment and Plan      Diagnoses and all orders for this visit:    1. Anemia, unspecified type (Primary)  -     Comprehensive Metabolic Panel  -     CBC & Differential  -     Iron Profile  -     ferrous sulfate 325 (65 FE) MG tablet; Take 1 tablet by mouth Every Other Day.  Dispense: 90 tablet; Refill: 1  -     " Vitamin B12 & Folate  -     Ferritin    2. Hot flashes  -     Comprehensive Metabolic Panel  -     CBC & Differential  -     Lipid Panel  -     escitalopram (Lexapro) 10 MG tablet; Take 1 tablet by mouth Daily.  Dispense: 30 tablet; Refill: 5    3. Straining with stools  -     docusate calcium (SURFAK) 240 MG capsule; Take 1 capsule by mouth Daily As Needed for Constipation (when taking iron).  Dispense: 90 capsule; Refill: 1  -     docusate sodium (COLACE) 100 MG capsule; Take 1 capsule by mouth Every Night.  Dispense: 90 capsule; Refill: 1    4. Seasonal allergic rhinitis due to other allergic trigger  -     loratadine (CLARITIN) 10 MG tablet; Take 1 tablet by mouth Daily.  Dispense: 90 tablet; Refill: 1    5. Arthritis  -     celecoxib (CeleBREX) 100 MG capsule; Take 1 capsule by mouth 2 (Two) Times a Day.  Dispense: 180 capsule; Refill: 1    6. Primary insomnia  -     traZODone (DESYREL) 50 MG tablet; 1/2 to 1 tab at bedtime  Dispense: 30 tablet; Refill: 2    7. Encounter for hepatitis C screening test for low risk patient  -     Hepatitis C antibody            Follow Up     Return in about 6 months (around 3/13/2023).  Follow-up in 6 months for labs and appt. Call with any concerns or questions that you may have regarding your medications or history.    If iron is greater then 70 we will stop iron altogether.  Keep me posted on how she is doing otherwise.  We will follow-up in 6 months.  I have reviewed all medications and at this time no medications changes need to be adjusted for all chronic conditions.    Patient was given instructions and counseling regarding her condition or for health maintenance advice. Please see specific information pulled into the AVS if appropriate.         Faith Ramos, APRN  09/13/2022

## 2022-10-29 DIAGNOSIS — F51.01 PRIMARY INSOMNIA: ICD-10-CM

## 2022-10-31 RX ORDER — TRAZODONE HYDROCHLORIDE 50 MG/1
TABLET ORAL
Qty: 30 TABLET | Refills: 2 | Status: SHIPPED | OUTPATIENT
Start: 2022-10-31 | End: 2023-01-30

## 2022-12-09 ENCOUNTER — LAB (OUTPATIENT)
Dept: LAB | Facility: HOSPITAL | Age: 57
End: 2022-12-09

## 2022-12-09 ENCOUNTER — TRANSCRIBE ORDERS (OUTPATIENT)
Dept: LAB | Facility: HOSPITAL | Age: 57
End: 2022-12-09

## 2022-12-09 DIAGNOSIS — E03.9 MYXEDEMA HEART DISEASE: ICD-10-CM

## 2022-12-09 DIAGNOSIS — I51.9 MYXEDEMA HEART DISEASE: ICD-10-CM

## 2022-12-09 DIAGNOSIS — E03.9 MYXEDEMA HEART DISEASE: Primary | ICD-10-CM

## 2022-12-09 DIAGNOSIS — I51.9 MYXEDEMA HEART DISEASE: Primary | ICD-10-CM

## 2022-12-09 LAB
T3FREE SERPL-MCNC: 3 PG/ML (ref 2–4.4)
T4 FREE SERPL-MCNC: 1.18 NG/DL (ref 0.93–1.7)
TSH SERPL DL<=0.05 MIU/L-ACNC: 1.36 UIU/ML (ref 0.27–4.2)

## 2022-12-09 PROCEDURE — 84481 FREE ASSAY (FT-3): CPT

## 2022-12-09 PROCEDURE — 84443 ASSAY THYROID STIM HORMONE: CPT

## 2022-12-09 PROCEDURE — 36415 COLL VENOUS BLD VENIPUNCTURE: CPT

## 2022-12-09 PROCEDURE — 84439 ASSAY OF FREE THYROXINE: CPT

## 2023-01-30 DIAGNOSIS — M19.90 ARTHRITIS: ICD-10-CM

## 2023-01-30 DIAGNOSIS — R05.9 COUGH: ICD-10-CM

## 2023-01-30 DIAGNOSIS — F51.01 PRIMARY INSOMNIA: ICD-10-CM

## 2023-01-30 DIAGNOSIS — J30.2 SEASONAL ALLERGIC RHINITIS, UNSPECIFIED TRIGGER: ICD-10-CM

## 2023-01-30 RX ORDER — MONTELUKAST SODIUM 10 MG/1
TABLET ORAL
Qty: 90 TABLET | Refills: 0 | Status: SHIPPED | OUTPATIENT
Start: 2023-01-30 | End: 2023-03-07 | Stop reason: SDUPTHER

## 2023-01-30 RX ORDER — TRAZODONE HYDROCHLORIDE 50 MG/1
TABLET ORAL
Qty: 30 TABLET | Refills: 2 | Status: SHIPPED | OUTPATIENT
Start: 2023-01-30 | End: 2023-03-07 | Stop reason: SDUPTHER

## 2023-02-17 ENCOUNTER — TELEPHONE (OUTPATIENT)
Dept: ORTHOPEDIC SURGERY | Facility: CLINIC | Age: 58
End: 2023-02-17

## 2023-02-17 NOTE — TELEPHONE ENCOUNTER
Caller: PATIENT     Relationship to patient: SELF    Best call back number: [phone]    Chief complaint: LEFT KNEE PAIN    Type of visit: CORTISONE INJECTION    Requested date: ASAP

## 2023-02-22 ENCOUNTER — PREP FOR SURGERY (OUTPATIENT)
Dept: OTHER | Facility: HOSPITAL | Age: 58
End: 2023-02-22
Payer: COMMERCIAL

## 2023-02-22 ENCOUNTER — OFFICE VISIT (OUTPATIENT)
Dept: ORTHOPEDIC SURGERY | Facility: CLINIC | Age: 58
End: 2023-02-22
Payer: COMMERCIAL

## 2023-02-22 VITALS — HEART RATE: 74 BPM | WEIGHT: 201.2 LBS | OXYGEN SATURATION: 97 % | BODY MASS INDEX: 31.58 KG/M2 | HEIGHT: 67 IN

## 2023-02-22 DIAGNOSIS — M17.12 PRIMARY OSTEOARTHRITIS OF LEFT KNEE: Primary | ICD-10-CM

## 2023-02-22 PROCEDURE — 20610 DRAIN/INJ JOINT/BURSA W/O US: CPT | Performed by: ORTHOPAEDIC SURGERY

## 2023-02-22 PROCEDURE — 99214 OFFICE O/P EST MOD 30 MIN: CPT | Performed by: ORTHOPAEDIC SURGERY

## 2023-02-22 RX ORDER — CEFAZOLIN SODIUM IN 0.9 % NACL 3 G/100 ML
3 INTRAVENOUS SOLUTION, PIGGYBACK (ML) INTRAVENOUS ONCE
OUTPATIENT
Start: 2023-02-22 | End: 2023-02-22

## 2023-02-22 RX ORDER — TRANEXAMIC ACID 10 MG/ML
1000 INJECTION, SOLUTION INTRAVENOUS ONCE
OUTPATIENT
Start: 2023-02-22 | End: 2023-02-22

## 2023-02-22 RX ORDER — LIDOCAINE HYDROCHLORIDE 10 MG/ML
5 INJECTION, SOLUTION EPIDURAL; INFILTRATION; INTRACAUDAL; PERINEURAL
Status: COMPLETED | OUTPATIENT
Start: 2023-02-22 | End: 2023-02-22

## 2023-02-22 RX ORDER — CEFAZOLIN SODIUM 2 G/100ML
2 INJECTION, SOLUTION INTRAVENOUS ONCE
OUTPATIENT
Start: 2023-02-22 | End: 2023-02-22

## 2023-02-22 RX ORDER — TRIAMCINOLONE ACETONIDE 40 MG/ML
40 INJECTION, SUSPENSION INTRA-ARTICULAR; INTRAMUSCULAR
Status: COMPLETED | OUTPATIENT
Start: 2023-02-22 | End: 2023-02-22

## 2023-02-22 RX ADMIN — LIDOCAINE HYDROCHLORIDE 5 ML: 10 INJECTION, SOLUTION EPIDURAL; INFILTRATION; INTRACAUDAL; PERINEURAL at 09:42

## 2023-02-22 RX ADMIN — TRIAMCINOLONE ACETONIDE 40 MG: 40 INJECTION, SUSPENSION INTRA-ARTICULAR; INTRAMUSCULAR at 09:42

## 2023-02-22 NOTE — PROGRESS NOTES
"Chief Complaint  Follow-up of the Left Knee     Subjective      Amber Poole presents to [facility] for follow up of the left knee. She has a history of osteo arthritis.  She has treated her knee conservatively.  She has pain with prolonged standing and ambulation. She states her knee pain is affection her daily activity.  She has had no recent injury.      Allergies   Allergen Reactions   • Codeine Anaphylaxis and Rash   • Penicillins Rash        Social History     Socioeconomic History   • Marital status:    Tobacco Use   • Smoking status: Never   • Smokeless tobacco: Never   Vaping Use   • Vaping Use: Never used   Substance and Sexual Activity   • Alcohol use: Never   • Drug use: Never        Review of Systems     Objective   Vital Signs:   Pulse 74   Ht 170.2 cm (67\")   Wt 91.3 kg (201 lb 3.2 oz)   SpO2 97%   BMI 31.51 kg/m²       Physical Exam  Constitutional:       Appearance: Normal appearance. Patient is well-developed and normal weight.   HENT:      Head: Normocephalic.      Right Ear: Hearing and external ear normal.      Left Ear: Hearing and external ear normal.      Nose: Nose normal.   Eyes:      Conjunctiva/sclera: Conjunctivae normal.   Cardiovascular:      Rate and Rhythm: Normal rate.   Pulmonary:      Effort: Pulmonary effort is normal.      Breath sounds: No wheezing or rales.   Abdominal:      Palpations: Abdomen is soft.      Tenderness: There is no abdominal tenderness.   Musculoskeletal:      Cervical back: Normal range of motion.   Skin:     Findings: No rash.   Neurological:      Mental Status: Patient  is alert and oriented to person, place, and time.   Psychiatric:         Mood and Affect: Mood and affect normal.         Judgment: Judgment normal.       Ortho Exam      LEFT KNEE Flexion 120. Extension 0. Stable to varus/valgus stress. Stable to anterior/posterior drawer. Neurovascularly intact. Negative Kane. Negative Lachman. Positive " EHL, FHL, HS and TA. Sensation intact to light touch all 5 nerves of the foot. Ambulates with Antalgic gait. Patella is well tracking. Calf supple, non-tender. Positive tenderness to the medial joint line. Positive tenderness to the lateral joint line. Positive Crepitus. Good strength to hamstrings, quadriceps, dorsiflexors, and plantar flexors.  Knee Extensor Mechanism intact          Large Joint Arthrocentesis: L knee  Date/Time: 2/22/2023 9:42 AM  Consent given by: patient  Site marked: site marked  Timeout: Immediately prior to procedure a time out was called to verify the correct patient, procedure, equipment, support staff and site/side marked as required   Supporting Documentation  Indications: pain   Procedure Details  Location: knee - L knee  Needle size: 22 G  Medications administered: 40 mg triamcinolone acetonide 40 MG/ML; 5 mL lidocaine PF 1% 1 %  Patient tolerance: patient tolerated the procedure well with no immediate complications              Imaging Results (Most Recent)     Procedure Component Value Units Date/Time    XR Knee 3 View Left [[id number]] Resulted: 02/22/23 0921     Updated: 02/22/23 0922           Result Review :     X-Ray Report:  Left knee X-Ray  Indication: Evaluation of the left knee.   AP/Lateral and Gandy view(s)  Findings: Advanced arthritic changes of the knee.  Severe patella chondromalacia.   Prior studies available for comparison: No            Assessment and Plan     Diagnoses and all orders for this visit:    1. Primary osteoarthritis of left knee (Primary)  -     XR Knee 3 View Left      Discussed the treatment plan with the patient. I reviewed the X-rays that were obtained today with the patient. Discussed the treatment options with the patient, operative vs non-operative. The patient expressed understanding and wished to proceed with a left knee total knee arthroplasty. She is going to also proceed with a left knee steroid injection.  She tolerated the injection well.      Discussed surgery., Risks/benefits discussed with patient including, but not limited to: infection, bleeding, neurovascular damage, malunion, nonunion, aesthetic deformity, need for further surgery, and death., Discussed with patient the implant type being used during surgery and patient understands., Surgery pamphlet given., DME order for a 3 in 1 given today due to patient will be confined to one room/level of the home that does not offer a toilet during postop recovery.  and Call or return if worsening symptoms.    Follow Up     2 weeks postoperatively       Patient was given instructions and counseling regarding her condition or for health maintenance advice. Please see specific information pulled into the AVS if appropriate.     Scribed for Carl Swartz MD by Leonila Sol MA.  02/22/23   09:27 EST      I have personally performed the services described in this document as scribed by the above individual and it is both accurate and complete. Carl Swartz MD 02/22/23

## 2023-02-27 ENCOUNTER — TELEPHONE (OUTPATIENT)
Dept: ORTHOPEDIC SURGERY | Facility: CLINIC | Age: 58
End: 2023-02-27
Payer: COMMERCIAL

## 2023-02-27 NOTE — TELEPHONE ENCOUNTER
CALLED PATRICIA AND INFORMED HER PER PCP, TERESA, PATIENT COULD BE OFF ASPIRIN FOR 5 DAYS PRIOR TO SURGERY.  PATIENT VOICES UNDERSTANDING.

## 2023-03-03 DIAGNOSIS — R19.8 STRAINING WITH STOOLS: ICD-10-CM

## 2023-03-03 DIAGNOSIS — J30.89 SEASONAL ALLERGIC RHINITIS DUE TO OTHER ALLERGIC TRIGGER: ICD-10-CM

## 2023-03-03 DIAGNOSIS — R23.2 HOT FLASHES: ICD-10-CM

## 2023-03-03 DIAGNOSIS — M19.90 ARTHRITIS: ICD-10-CM

## 2023-03-03 RX ORDER — CELECOXIB 100 MG/1
CAPSULE ORAL
Qty: 180 CAPSULE | Refills: 1 | OUTPATIENT
Start: 2023-03-03

## 2023-03-03 RX ORDER — DOCUSATE SODIUM 100 MG/1
CAPSULE, LIQUID FILLED ORAL
Qty: 90 CAPSULE | Refills: 1 | OUTPATIENT
Start: 2023-03-03

## 2023-03-03 RX ORDER — LORATADINE 10 MG/1
TABLET ORAL
Qty: 90 TABLET | Refills: 1 | OUTPATIENT
Start: 2023-03-03

## 2023-03-03 RX ORDER — ESCITALOPRAM OXALATE 10 MG/1
TABLET ORAL
Qty: 30 TABLET | Refills: 1 | OUTPATIENT
Start: 2023-03-03

## 2023-03-07 ENCOUNTER — OFFICE VISIT (OUTPATIENT)
Dept: FAMILY MEDICINE CLINIC | Facility: CLINIC | Age: 58
End: 2023-03-07
Payer: COMMERCIAL

## 2023-03-07 VITALS
RESPIRATION RATE: 16 BRPM | HEIGHT: 67 IN | SYSTOLIC BLOOD PRESSURE: 122 MMHG | HEART RATE: 71 BPM | DIASTOLIC BLOOD PRESSURE: 77 MMHG | BODY MASS INDEX: 30.35 KG/M2 | WEIGHT: 193.4 LBS | TEMPERATURE: 98 F | OXYGEN SATURATION: 96 %

## 2023-03-07 DIAGNOSIS — R23.2 HOT FLASHES: ICD-10-CM

## 2023-03-07 DIAGNOSIS — D64.9 ANEMIA, UNSPECIFIED TYPE: Primary | ICD-10-CM

## 2023-03-07 DIAGNOSIS — M19.90 ARTHRITIS: ICD-10-CM

## 2023-03-07 DIAGNOSIS — R19.8 STRAINING WITH STOOLS: ICD-10-CM

## 2023-03-07 DIAGNOSIS — Z13.220 SCREENING, LIPID: ICD-10-CM

## 2023-03-07 DIAGNOSIS — N89.8 VAGINAL DRYNESS: ICD-10-CM

## 2023-03-07 DIAGNOSIS — J30.89 SEASONAL ALLERGIC RHINITIS DUE TO OTHER ALLERGIC TRIGGER: ICD-10-CM

## 2023-03-07 DIAGNOSIS — R05.9 COUGH: ICD-10-CM

## 2023-03-07 DIAGNOSIS — L30.9 DERMATITIS: ICD-10-CM

## 2023-03-07 DIAGNOSIS — J30.2 SEASONAL ALLERGIC RHINITIS, UNSPECIFIED TRIGGER: ICD-10-CM

## 2023-03-07 DIAGNOSIS — F51.01 PRIMARY INSOMNIA: ICD-10-CM

## 2023-03-07 LAB
25(OH)D3 SERPL-MCNC: 46.9 NG/ML (ref 30–100)
ALBUMIN SERPL-MCNC: 4.4 G/DL (ref 3.5–5.2)
ALBUMIN/GLOB SERPL: 1.4 G/DL
ALP SERPL-CCNC: 73 U/L (ref 39–117)
ALT SERPL W P-5'-P-CCNC: 10 U/L (ref 1–33)
ANION GAP SERPL CALCULATED.3IONS-SCNC: 11.3 MMOL/L (ref 5–15)
AST SERPL-CCNC: 10 U/L (ref 1–32)
BASOPHILS # BLD AUTO: 0.04 10*3/MM3 (ref 0–0.2)
BASOPHILS NFR BLD AUTO: 0.4 % (ref 0–1.5)
BILIRUB SERPL-MCNC: 0.3 MG/DL (ref 0–1.2)
BUN SERPL-MCNC: 19 MG/DL (ref 6–20)
BUN/CREAT SERPL: 24.7 (ref 7–25)
CALCIUM SPEC-SCNC: 9.5 MG/DL (ref 8.6–10.5)
CHLORIDE SERPL-SCNC: 105 MMOL/L (ref 98–107)
CHOLEST SERPL-MCNC: 219 MG/DL (ref 0–200)
CO2 SERPL-SCNC: 23.7 MMOL/L (ref 22–29)
CREAT SERPL-MCNC: 0.77 MG/DL (ref 0.57–1)
DEPRECATED RDW RBC AUTO: 37.8 FL (ref 37–54)
EGFRCR SERPLBLD CKD-EPI 2021: 91.2 ML/MIN/1.73
EOSINOPHIL # BLD AUTO: 0.15 10*3/MM3 (ref 0–0.4)
EOSINOPHIL NFR BLD AUTO: 1.5 % (ref 0.3–6.2)
ERYTHROCYTE [DISTWIDTH] IN BLOOD BY AUTOMATED COUNT: 12.1 % (ref 12.3–15.4)
FERRITIN SERPL-MCNC: 69.5 NG/ML (ref 13–150)
FOLATE SERPL-MCNC: 11.2 NG/ML (ref 4.78–24.2)
GLOBULIN UR ELPH-MCNC: 3.1 GM/DL
GLUCOSE SERPL-MCNC: 96 MG/DL (ref 65–99)
HCT VFR BLD AUTO: 42.6 % (ref 34–46.6)
HDLC SERPL-MCNC: 45 MG/DL (ref 40–60)
HGB BLD-MCNC: 14.5 G/DL (ref 12–15.9)
IMM GRANULOCYTES # BLD AUTO: 0.04 10*3/MM3 (ref 0–0.05)
IMM GRANULOCYTES NFR BLD AUTO: 0.4 % (ref 0–0.5)
IRON 24H UR-MRATE: 54 MCG/DL (ref 37–145)
IRON SATN MFR SERPL: 11 % (ref 20–50)
LDLC SERPL CALC-MCNC: 148 MG/DL (ref 0–100)
LDLC/HDLC SERPL: 3.22 {RATIO}
LYMPHOCYTES # BLD AUTO: 2 10*3/MM3 (ref 0.7–3.1)
LYMPHOCYTES NFR BLD AUTO: 20.4 % (ref 19.6–45.3)
MCH RBC QN AUTO: 29.2 PG (ref 26.6–33)
MCHC RBC AUTO-ENTMCNC: 34 G/DL (ref 31.5–35.7)
MCV RBC AUTO: 85.9 FL (ref 79–97)
MONOCYTES # BLD AUTO: 0.64 10*3/MM3 (ref 0.1–0.9)
MONOCYTES NFR BLD AUTO: 6.5 % (ref 5–12)
NEUTROPHILS NFR BLD AUTO: 6.92 10*3/MM3 (ref 1.7–7)
NEUTROPHILS NFR BLD AUTO: 70.8 % (ref 42.7–76)
NRBC BLD AUTO-RTO: 0 /100 WBC (ref 0–0.2)
PLATELET # BLD AUTO: 296 10*3/MM3 (ref 140–450)
PMV BLD AUTO: 11.2 FL (ref 6–12)
POTASSIUM SERPL-SCNC: 4.5 MMOL/L (ref 3.5–5.2)
PROT SERPL-MCNC: 7.5 G/DL (ref 6–8.5)
RBC # BLD AUTO: 4.96 10*6/MM3 (ref 3.77–5.28)
SODIUM SERPL-SCNC: 140 MMOL/L (ref 136–145)
TIBC SERPL-MCNC: 490 MCG/DL (ref 298–536)
TRANSFERRIN SERPL-MCNC: 329 MG/DL (ref 200–360)
TRIGL SERPL-MCNC: 145 MG/DL (ref 0–150)
VIT B12 BLD-MCNC: 269 PG/ML (ref 211–946)
VLDLC SERPL-MCNC: 26 MG/DL (ref 5–40)
WBC NRBC COR # BLD: 9.79 10*3/MM3 (ref 3.4–10.8)

## 2023-03-07 PROCEDURE — 80053 COMPREHEN METABOLIC PANEL: CPT | Performed by: NURSE PRACTITIONER

## 2023-03-07 PROCEDURE — 84466 ASSAY OF TRANSFERRIN: CPT | Performed by: NURSE PRACTITIONER

## 2023-03-07 PROCEDURE — 83540 ASSAY OF IRON: CPT | Performed by: NURSE PRACTITIONER

## 2023-03-07 PROCEDURE — 82746 ASSAY OF FOLIC ACID SERUM: CPT | Performed by: NURSE PRACTITIONER

## 2023-03-07 PROCEDURE — 80061 LIPID PANEL: CPT | Performed by: NURSE PRACTITIONER

## 2023-03-07 PROCEDURE — 85025 COMPLETE CBC W/AUTO DIFF WBC: CPT | Performed by: NURSE PRACTITIONER

## 2023-03-07 PROCEDURE — 82728 ASSAY OF FERRITIN: CPT | Performed by: NURSE PRACTITIONER

## 2023-03-07 PROCEDURE — 99214 OFFICE O/P EST MOD 30 MIN: CPT | Performed by: NURSE PRACTITIONER

## 2023-03-07 PROCEDURE — 82306 VITAMIN D 25 HYDROXY: CPT | Performed by: NURSE PRACTITIONER

## 2023-03-07 PROCEDURE — 82607 VITAMIN B-12: CPT | Performed by: NURSE PRACTITIONER

## 2023-03-07 RX ORDER — LORATADINE 10 MG/1
10 TABLET ORAL DAILY
Qty: 90 TABLET | Refills: 1 | Status: SHIPPED | OUTPATIENT
Start: 2023-03-07

## 2023-03-07 RX ORDER — ESCITALOPRAM OXALATE 10 MG/1
10 TABLET ORAL DAILY
Qty: 30 TABLET | Refills: 5 | Status: SHIPPED | OUTPATIENT
Start: 2023-03-07

## 2023-03-07 RX ORDER — TRAZODONE HYDROCHLORIDE 50 MG/1
TABLET ORAL
Qty: 30 TABLET | Refills: 5 | Status: SHIPPED | OUTPATIENT
Start: 2023-03-07

## 2023-03-07 RX ORDER — ALCLOMETASONE DIPROPIONATE 0.5 MG/G
1 CREAM TOPICAL 2 TIMES DAILY
Qty: 60 G | Refills: 0 | Status: SHIPPED | OUTPATIENT
Start: 2023-03-07

## 2023-03-07 RX ORDER — DOCUSATE SODIUM 100 MG/1
100 CAPSULE, LIQUID FILLED ORAL NIGHTLY
Qty: 90 CAPSULE | Refills: 1 | Status: SHIPPED | OUTPATIENT
Start: 2023-03-07

## 2023-03-07 RX ORDER — MONTELUKAST SODIUM 10 MG/1
10 TABLET ORAL
Qty: 90 TABLET | Refills: 1 | Status: SHIPPED | OUTPATIENT
Start: 2023-03-07

## 2023-03-07 RX ORDER — CELECOXIB 100 MG/1
100 CAPSULE ORAL 2 TIMES DAILY
Qty: 180 CAPSULE | Refills: 1 | Status: SHIPPED | OUTPATIENT
Start: 2023-03-07

## 2023-03-07 NOTE — PROGRESS NOTES
Chief Complaint  Depression and Hypothyroidism    Subjective          Amber Poole presents to [facility]  History of Present Illness  Anemia: She is not taking iron for several month.   She does take her Vit D.  She is doing her colace daily.  Hot Flashes:  The lexapro is doing good and she is still doing the hormones.  She is using the cream every now then maybe 2-3 times a month.    Allergies:  She is taking her claritin and singular.  Arthritis:  She is taking the celebrex twice a day.  She is doing the voltaren gel.    Insomnia: She is doing well with her trazodone.  She takes 1/2-1 and is sleeping better.  Jan 15th keto since then.  She has lost about 8 pounds.   She goes this Friday to Gyn.  She goes for the knee replacement is in May 26th.      Allergies  Codeine and Penicillins    Social History     Tobacco Use   • Smoking status: Never   • Smokeless tobacco: Never   Vaping Use   • Vaping Use: Never used   Substance Use Topics   • Alcohol use: Never   • Drug use: Never       Family History   Problem Relation Age of Onset   • Diabetes Mother    • Kidney nephrosis Mother    • Heart disease Father    • Vision loss Father    • Macular degeneration Father    • Kidney nephrosis Maternal Grandmother    • Other Maternal Grandmother         BLADDER CALCULUS   • Liver cancer Other         UNSPECIFIED   • Lung cancer Other         UNSPECIFIED   • Cancer Daughter    • Leukemia Daughter    • Leukemia Son    • Macular degeneration Sister         Health Maintenance Due   Topic Date Due   • ZOSTER VACCINE (1 of 2) Never done   • ANNUAL PHYSICAL  Never done        Immunization History   Administered Date(s) Administered   • Influenza, Unspecified 10/01/2020   • Tdap 08/06/2018       Review of Systems   Constitutional: Negative for fatigue.   Respiratory: Negative for cough and shortness of breath.    Cardiovascular: Negative for chest pain.   Gastrointestinal: Negative for diarrhea,  "nausea and vomiting.        Objective       Vitals:    03/07/23 0905   BP: 122/77   Pulse: 71   Resp: 16   Temp: 98 °F (36.7 °C)   SpO2: 96%   Weight: 87.7 kg (193 lb 6.4 oz)   Height: 170.2 cm (67\")       Body mass index is 30.29 kg/m².         Physical Exam  Vitals reviewed.   Constitutional:       Appearance: Normal appearance. She is well-developed.   Cardiovascular:      Rate and Rhythm: Normal rate and regular rhythm.      Heart sounds: Normal heart sounds. No murmur heard.  Pulmonary:      Effort: Pulmonary effort is normal.      Breath sounds: Normal breath sounds.   Neurological:      Mental Status: She is alert and oriented to person, place, and time.      Cranial Nerves: No cranial nerve deficit.      Motor: No weakness.   Psychiatric:         Mood and Affect: Mood and affect normal.             Result Review :     The following data was reviewed by: SHAILESH Colvin on 03/07/2023:    Common Labs   Common labs    Common Labs 9/13/22 9/13/22 9/13/22    0908 0908 0908   Glucose  97    BUN  17    Creatinine  0.74    Sodium  141    Potassium  4.5    Chloride  104    Calcium  9.6    Albumin  4.30    Total Bilirubin  0.3    Alkaline Phosphatase  90    AST (SGOT)  13    ALT (SGPT)  15    WBC 8.17     Hemoglobin 13.6     Hematocrit 40.6     Platelets 258     Total Cholesterol   192   Triglycerides   163 (A)   HDL Cholesterol   42   LDL Cholesterol    121 (A)   (A) Abnormal value                           Assessment and Plan      Diagnoses and all orders for this visit:    1. Anemia, unspecified type (Primary)  -     Vitamin D,25-Hydroxy  -     Vitamin B12 & Folate  -     Ferritin  -     Iron Profile    2. Primary insomnia  -     traZODone (DESYREL) 50 MG tablet; TAKE 1/2 TO 1 TABLET BY MOUTH AT BEDTIME  Dispense: 30 tablet; Refill: 5    3. Seasonal allergic rhinitis, unspecified trigger  -     montelukast (SINGULAIR) 10 MG tablet; Take 1 tablet by mouth every night at bedtime.  Dispense: 90 tablet; " Refill: 1    4. Cough  -     montelukast (SINGULAIR) 10 MG tablet; Take 1 tablet by mouth every night at bedtime.  Dispense: 90 tablet; Refill: 1    5. Seasonal allergic rhinitis due to other allergic trigger  -     loratadine (CLARITIN) 10 MG tablet; Take 1 tablet by mouth Daily.  Dispense: 90 tablet; Refill: 1    6. Hot flashes  -     escitalopram (Lexapro) 10 MG tablet; Take 1 tablet by mouth Daily.  Dispense: 30 tablet; Refill: 5    7. Straining with stools  -     docusate sodium (COLACE) 100 MG capsule; Take 1 capsule by mouth Every Night.  Dispense: 90 capsule; Refill: 1    8. Arthritis  -     Diclofenac Sodium (VOLTAREN) 1 % gel gel; Apply  topically to the appropriate area as directed 4 (Four) Times a Day.  Dispense: 300 g; Refill: 2  -     celecoxib (CeleBREX) 100 MG capsule; Take 1 capsule by mouth 2 (Two) Times a Day.  Dispense: 180 capsule; Refill: 1    9. Dermatitis  -     alclometasone (ACLOVATE) 0.05 % cream; Apply 1 application topically to the appropriate area as directed 2 (Two) Times a Day.  Dispense: 60 g; Refill: 0    10. Vaginal dryness  -     alclometasone (ACLOVATE) 0.05 % cream; Apply 1 application topically to the appropriate area as directed 2 (Two) Times a Day.  Dispense: 60 g; Refill: 0    11. Screening, lipid  -     Comprehensive Metabolic Panel  -     CBC & Differential  -     Lipid Panel            Follow Up     Return in about 6 months (around 9/7/2023).  We will check labs today and see if we need to start back on iron.  Follow-up in 6 months for labs and appt. Call with any concerns or questions that you may have regarding your medications or history.    I have reviewed all medications and at this time no medications changes need to be adjusted for all chronic conditions.  She is not able to go to endocrinology in June then I will place the orders and have her do her thyroid labs.  Patient was given instructions and counseling regarding her condition or for health maintenance  advice. Please see specific information pulled into the AVS if appropriate.     Parts of this note are electronic transcriptions/translations of spoken language to printed text using the Dragon Dictation system.          Faith Ramos, SHAILESH  03/07/2023  Answers for HPI/ROS submitted by the patient on 2/28/2023  Please describe your symptoms.: Med refills and labs  Have you had these symptoms before?: No  How long have you been having these symptoms?: 1-4 days  What is the primary reason for your visit?: Other

## 2023-03-08 RX ORDER — ATORVASTATIN CALCIUM 20 MG/1
20 TABLET, FILM COATED ORAL DAILY
Qty: 90 TABLET | Refills: 1 | Status: SHIPPED | OUTPATIENT
Start: 2023-03-08

## 2023-03-27 ENCOUNTER — TELEPHONE (OUTPATIENT)
Dept: ORTHOPEDIC SURGERY | Facility: CLINIC | Age: 58
End: 2023-03-27
Payer: COMMERCIAL

## 2023-03-30 ENCOUNTER — OFFICE VISIT (OUTPATIENT)
Dept: FAMILY MEDICINE CLINIC | Facility: CLINIC | Age: 58
End: 2023-03-30
Payer: COMMERCIAL

## 2023-03-30 ENCOUNTER — HOSPITAL ENCOUNTER (OUTPATIENT)
Dept: GENERAL RADIOLOGY | Facility: HOSPITAL | Age: 58
Discharge: HOME | End: 2023-03-30
Admitting: NURSE PRACTITIONER
Payer: COMMERCIAL

## 2023-03-30 VITALS
OXYGEN SATURATION: 97 % | WEIGHT: 191.9 LBS | DIASTOLIC BLOOD PRESSURE: 64 MMHG | HEART RATE: 69 BPM | BODY MASS INDEX: 30.12 KG/M2 | SYSTOLIC BLOOD PRESSURE: 100 MMHG | TEMPERATURE: 97.6 F | HEIGHT: 67 IN | RESPIRATION RATE: 20 BRPM

## 2023-03-30 DIAGNOSIS — M25.561 ACUTE PAIN OF RIGHT KNEE: Primary | ICD-10-CM

## 2023-03-30 DIAGNOSIS — M25.561 ACUTE PAIN OF RIGHT KNEE: ICD-10-CM

## 2023-03-30 LAB — URATE SERPL-MCNC: 4.4 MG/DL (ref 2.4–5.7)

## 2023-03-30 PROCEDURE — 73562 X-RAY EXAM OF KNEE 3: CPT

## 2023-03-30 PROCEDURE — 84550 ASSAY OF BLOOD/URIC ACID: CPT | Performed by: NURSE PRACTITIONER

## 2023-03-30 RX ORDER — INDOMETHACIN 25 MG/1
25 CAPSULE ORAL 3 TIMES DAILY PRN
Qty: 40 CAPSULE | Refills: 0 | Status: SHIPPED | OUTPATIENT
Start: 2023-03-30

## 2023-03-30 RX ORDER — PREDNISONE 20 MG/1
TABLET ORAL
Qty: 18 TABLET | Refills: 0 | Status: SHIPPED | OUTPATIENT
Start: 2023-03-30

## 2023-03-30 NOTE — PROGRESS NOTES
Chief Complaint  Knee Pain (Right knee pain and swelling, states pain started last saturday)    Subjective          Amber Poole presents to [facility]  History of Present Illness  She has been cleaning the house and then she has been walking 2 miles a day.  She even tried to do some abdominal exercises with jumping and twisting and she did not know if that potentially aggravated the right knee.  She said that it has just been very painful she has been taking hydrocodone's that Dr. Swartz had given her previously which after the fourth pill it did start helping and ease the pain though she felt woozy feeling.  She is using her Voltaren gel.  She is taking her Celebrex.  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  She is very worried about her daughter.  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  She does go for her knee surgery on the left-hand side May 26.  She does have an appointment with Dr. Swartz on April 12      Allergies  Codeine and Penicillins    Social History     Tobacco Use   • Smoking status: Never   • Smokeless tobacco: Never   Vaping Use   • Vaping Use: Never used   Substance Use Topics   • Alcohol use: Never   • Drug use: Never       Family History   Problem Relation Age of Onset   • Diabetes Mother    • Kidney nephrosis Mother    • Heart disease Father    • Vision loss Father    • Macular degeneration Father    • Kidney nephrosis Maternal Grandmother    • Other Maternal Grandmother         BLADDER CALCULUS   • Liver cancer Other         UNSPECIFIED   • Lung cancer Other         UNSPECIFIED   • Cancer Daughter    • Leukemia Daughter    • Leukemia Son    • Macular degeneration Sister         Health Maintenance Due   Topic Date Due   • ZOSTER VACCINE (1 of 2) Never done   • ANNUAL PHYSICAL  Never done        Immunization History  "  Administered Date(s) Administered   • Influenza, Unspecified 10/01/2020   • Tdap 08/06/2018       Review of Systems   Musculoskeletal: Positive for arthralgias, gait problem, joint swelling and myalgias.   Psychiatric/Behavioral: Positive for stress. The patient is nervous/anxious.         Objective       Vitals:    03/30/23 0814   BP: 100/64   Pulse: 69   Resp: 20   Temp: 97.6 °F (36.4 °C)   SpO2: 97%   Weight: 87 kg (191 lb 14.4 oz)   Height: 170.2 cm (67\")       Body mass index is 30.06 kg/m².         Physical Exam  Vitals reviewed.   Constitutional:       Appearance: Normal appearance. She is well-developed.   HENT:      Head: Normocephalic.   Pulmonary:      Effort: Pulmonary effort is normal.   Musculoskeletal:         General: Swelling and tenderness present.        Legs:    Skin:     Findings: No bruising.   Neurological:      General: No focal deficit present.      Mental Status: She is alert and oriented to person, place, and time.      Cranial Nerves: No cranial nerve deficit.      Motor: No weakness.   Psychiatric:         Mood and Affect: Mood and affect normal.         Behavior: Behavior normal.         Thought Content: Thought content normal.         Judgment: Judgment normal.             Result Review :     The following data was reviewed by: SHAILESH Colvin on 03/30/2023:                     Assessment and Plan      Diagnoses and all orders for this visit:    1. Acute pain of right knee (Primary)  -     predniSONE (DELTASONE) 20 MG tablet; 1 tab tid for 3 days then 1 tab bid for 3 days then 1 tab daily for 3 days  Dispense: 18 tablet; Refill: 0  -     Uric Acid  -     indomethacin (INDOCIN) 25 MG capsule; Take 1 capsule by mouth 3 (Three) Times a Day As Needed for Mild Pain or Moderate Pain.  Dispense: 40 capsule; Refill: 0  -     XR Knee 3 View Right; Future            Follow Up     Return if symptoms worsen or fail to improve.  Do not take the celebrex for right now but start the " indocine--3 times a day with food.  Rest and ice the knee.  We will do steroids.  Call with any concerns or questions.  Patient was given instructions and counseling regarding her condition or for health maintenance advice. Please see specific information pulled into the AVS if appropriate.     Parts of this note are electronic transcriptions/translations of spoken language to printed text using the Dragon Dictation system.          SHAILESH Colvin  03/30/2023  Answers for HPI/ROS submitted by the patient on 3/27/2023  Please describe your symptoms.: Right knee pain  Have you had these symptoms before?: Yes  How long have you been having these symptoms?: 1-4 days  What is the primary reason for your visit?: Other    Faith Ramos, SHAILESH  03/30/2023

## 2023-04-12 ENCOUNTER — OFFICE VISIT (OUTPATIENT)
Dept: ORTHOPEDIC SURGERY | Facility: CLINIC | Age: 58
End: 2023-04-12
Payer: COMMERCIAL

## 2023-04-12 VITALS — BODY MASS INDEX: 29.98 KG/M2 | WEIGHT: 191 LBS | HEIGHT: 67 IN

## 2023-04-12 DIAGNOSIS — R19.8 STRAINING WITH STOOLS: ICD-10-CM

## 2023-04-12 DIAGNOSIS — M17.11 OSTEOARTHRITIS OF RIGHT KNEE, UNSPECIFIED OSTEOARTHRITIS TYPE: Primary | ICD-10-CM

## 2023-04-12 RX ORDER — DOCUSATE CALCIUM 240 MG/1
CAPSULE, LIQUID FILLED ORAL
Qty: 90 CAPSULE | Refills: 1 | Status: SHIPPED | OUTPATIENT
Start: 2023-04-12

## 2023-04-12 RX ADMIN — LIDOCAINE HYDROCHLORIDE 5 ML: 10 INJECTION, SOLUTION EPIDURAL; INFILTRATION; INTRACAUDAL; PERINEURAL at 10:21

## 2023-04-12 RX ADMIN — METHYLPREDNISOLONE ACETATE 80 MG: 80 INJECTION, SUSPENSION INTRA-ARTICULAR; INTRALESIONAL; INTRAMUSCULAR; SOFT TISSUE at 10:21

## 2023-04-12 NOTE — PROGRESS NOTES
"Chief Complaint  Initial Evaluation of the Right Knee     Subjective      Amber Poole presents to [facility] for initial evaluation of the right knee. She has been cleaning the house and then she has been walking 2 miles a day.  She even tried to do some abdominal exercises with jumping and twisting and she did not know if that potentially aggravated the right knee.      Allergies   Allergen Reactions   • Codeine Anaphylaxis and Rash   • Penicillins Rash        Social History     Socioeconomic History   • Marital status:    Tobacco Use   • Smoking status: Never   • Smokeless tobacco: Never   Vaping Use   • Vaping Use: Never used   Substance and Sexual Activity   • Alcohol use: Never   • Drug use: Never   • Sexual activity: Defer        Review of Systems     Objective   Vital Signs:   Ht 170.2 cm (67\")   Wt 86.6 kg (191 lb)   BMI 29.91 kg/m²       Physical Exam  Constitutional:       Appearance: Normal appearance. Patient is well-developed and normal weight.   HENT:      Head: Normocephalic.      Right Ear: Hearing and external ear normal.      Left Ear: Hearing and external ear normal.      Nose: Nose normal.   Eyes:      Conjunctiva/sclera: Conjunctivae normal.   Cardiovascular:      Rate and Rhythm: Normal rate.   Pulmonary:      Effort: Pulmonary effort is normal.      Breath sounds: No wheezing or rales.   Abdominal:      Palpations: Abdomen is soft.      Tenderness: There is no abdominal tenderness.   Musculoskeletal:      Cervical back: Normal range of motion.   Skin:     Findings: No rash.   Neurological:      Mental Status: Patient  is alert and oriented to person, place, and time.   Psychiatric:         Mood and Affect: Mood and affect normal.         Judgment: Judgment normal.       Ortho Exam      RIGHT KNEE Flexion 125. Extension 0. Stable to varus/valgus stress. Stable to anterior/posterior drawer. Neurovascularly intact. Negative Kane. Negative " Lachman. Positive EHL, FHL, HS and TA. Sensation intact to light touch all 5 nerves of the foot. Ambulates with Antalgic gait. Patella is well tracking. Calf supple, non-tender. Positive tenderness to the medial joint line. Positive tenderness to the lateral joint line. Positive Crepitus. Good strength to hamstrings, quadriceps, dorsiflexors, and plantar flexors.  Knee Extensor Mechanism intact          Large Joint Arthrocentesis: R knee  Date/Time: 4/12/2023 10:21 AM  Consent given by: patient  Site marked: site marked  Timeout: Immediately prior to procedure a time out was called to verify the correct patient, procedure, equipment, support staff and site/side marked as required   Supporting Documentation  Indications: pain   Procedure Details  Location: knee - R knee  Needle gauge: 21g.  Medications administered: 5 mL lidocaine PF 1% 1 %; 80 mg methylPREDNISolone acetate 80 MG/ML  Patient tolerance: patient tolerated the procedure well with no immediate complications              Imaging Results (Most Recent)     None           Result Review :       XR Knee 3 View Right    Result Date: 3/30/2023  Narrative: PROCEDURE: XR KNEE 3 VW RIGHT  COMPARISON: None  INDICATIONS: ACUTE RIGHT KNEE PAIN AND SWELLING FOR 5 DAYS. WARM TO TOUCH. NO KNOWN INJURY.  FINDINGS:  There is no acute fracture or dislocation.  There is mild degenerative spurring of the tibial spines.  There is degenerate spurring of the patella.  There is a large joint effusion.  No bony erosion or abnormal periosteal reaction.      Impression:   1. Mild degenerative change of the right knee with large joint effusion.  No acute bony abnormality.      NAZANIN GRANT MD       Electronically Signed and Approved By: NAZANIN GRANT MD on 3/30/2023 at 9:38                      Assessment and Plan     Diagnoses and all orders for this visit:    1. Osteoarthritis of right knee, unspecified osteoarthritis type (Primary)    Other orders  -     Large Joint  Arthrocentesis: R knee        Discussed the treatment plan with the patient. I reviewed the X-rays that were obtained 3/20/23 with the patient. Discussed the risks and benefits of conservative measures.  The patient expressed understanding and wished to proceed with a right knee steroid injection.  She tolerated the injection well.     Call or return if worsening symptoms.    Follow Up     PRN      Patient was given instructions and counseling regarding her condition or for health maintenance advice. Please see specific information pulled into the AVS if appropriate.     Scribed for Carl Swartz MD by Leonila Sol MA.  04/12/23   09:52 EDT      I have personally performed the services described in this document as scribed by the above individual and it is both accurate and complete. Carl Swartz MD 04/14/23

## 2023-04-14 ENCOUNTER — TELEPHONE (OUTPATIENT)
Dept: ORTHOPEDIC SURGERY | Facility: CLINIC | Age: 58
End: 2023-04-14
Payer: COMMERCIAL

## 2023-04-14 RX ORDER — METHYLPREDNISOLONE ACETATE 80 MG/ML
80 INJECTION, SUSPENSION INTRA-ARTICULAR; INTRALESIONAL; INTRAMUSCULAR; SOFT TISSUE
Status: COMPLETED | OUTPATIENT
Start: 2023-04-12 | End: 2023-04-12

## 2023-04-14 RX ORDER — LIDOCAINE HYDROCHLORIDE 10 MG/ML
5 INJECTION, SOLUTION EPIDURAL; INFILTRATION; INTRACAUDAL; PERINEURAL
Status: COMPLETED | OUTPATIENT
Start: 2023-04-12 | End: 2023-04-12

## 2023-04-14 NOTE — TELEPHONE ENCOUNTER
Provider: DR HANNON    Caller: OSCAR CANNON    Relationship to Patient: SELF    Pharmacy: [facility] IN [place]    Phone Number: [phone]    Reason for Call: PT STILL HAVING PAIN IN RIGHT KNEE EVEN AFTER CORTISONE INJECTION, WANTS TO KNOW IF THERE'S SOMETHING ELSE THAT CAN BE DONE TO HELP FOR PAIN RELIEF.

## 2023-04-14 NOTE — TELEPHONE ENCOUNTER
I spoke with patient and explained to her that the cortisone injection sometimes takes about 5-7 days to work.  Patient voiced understanding.  I told her that ice would probably help as well as OTC NSAIDS.  Patient again voiced understanding and will give it time to work.

## 2023-04-19 ENCOUNTER — TELEPHONE (OUTPATIENT)
Dept: ORTHOPEDIC SURGERY | Facility: CLINIC | Age: 58
End: 2023-04-19
Payer: COMMERCIAL

## 2023-04-19 NOTE — TELEPHONE ENCOUNTER
Caller: Amber Poole    Relationship: Self    Best call back number:     What is the best time to reach you: ANY    Who are you requesting to speak with (clinical staff, provider,  specific staff member): CLINICAL    What was the call regarding: PATIENT HAD AN INJECTION IN RIGHT KNEE LAST WEEK AND SPOKE WITH A NURSE Friday THAT ADVISED HER TO CALL BACK IN IF SHE DIDN'T GET ANY RELIEF    Do you require a callback: YES

## 2023-04-24 ENCOUNTER — OFFICE VISIT (OUTPATIENT)
Dept: ORTHOPEDIC SURGERY | Facility: CLINIC | Age: 58
End: 2023-04-24
Payer: COMMERCIAL

## 2023-04-24 VITALS — BODY MASS INDEX: 29.98 KG/M2 | HEIGHT: 67 IN | WEIGHT: 191 LBS

## 2023-04-24 DIAGNOSIS — M25.461 EFFUSION OF RIGHT KNEE: ICD-10-CM

## 2023-04-24 DIAGNOSIS — M17.11 OSTEOARTHRITIS OF RIGHT KNEE, UNSPECIFIED OSTEOARTHRITIS TYPE: Primary | ICD-10-CM

## 2023-04-24 NOTE — PATIENT INSTRUCTIONS
She is scheduled for left TKA on 5/26/2023 by Dr. LUZ ELENA Swartz, however, currently reports that right knee is significantly more painful than her left.  Patient with minimal relief with R knee injection see the last visit. X-rays R knee taken (with sunrise and standing views) and reviewed. Order for R Knee MRI placed. Follow up with results. R knee brace provided to patient.     Continue Celebrex, tylenol, and topical voltaren gel.     Follow up with MRI results. Call with changes or concerns.

## 2023-04-24 NOTE — PROGRESS NOTES
"Chief Complaint  Pain and Follow-up of the Right Knee    Subjective      Amber Poole presents to [facility] for follow-up right knee.  She was initially evaluated for this in office on 4/12/2023.  She reports that she \"never had issues with my right knee until 3/25/2023.  She is scheduled for left total knee arthroplasty with Dr. LUZ ELENA Swartz on 5/26/2023.  At her last office visit for her right knee she received a steroid injection and reports that \"normally injections help the same day and provided significant relief by day 3, however, this has not provided any relief\".  She reports feeling of knee instability.  States currently her right knee is significantly more bothersome than her left knee.    Objective   Allergies   Allergen Reactions   • Codeine Anaphylaxis and Rash   • Penicillins Rash       Vital Signs:   Ht 170.2 cm (67\")   Wt 86.6 kg (191 lb)   BMI 29.91 kg/m²       Physical Exam    Constitutional: Awake, alert. Well nourished appearance.    Integumentary: Warm, dry, intact. No obvious rashes.    HENT: Atraumatic, normocephalic.   Respiratory: Non labored respirations .   Cardiovascular: Intact peripheral pulses.    Psychiatric: Normal mood and affect. A&O X3    Ortho Exam  Right knee: Skin is warm, dry, and intact.  Tenderness to palpation of medial and lateral joint lines.  Positive patellar ballottement.  Moderate edema.  Knee is stable to varus and valgus stress.  Full extension and flexion to 125 degrees reported with pain.  Full plantarflexion and dorsiflexion of the ankle.  Sensation intact light touch.  Distal neurovascular intact.  Smooth sit to stand transition.  Negative Lachman's.    Imaging Results (Most Recent)     Procedure Component Value Units Date/Time    XR Knee 3 View Right [[id number]] Resulted: 04/24/23 1021     Updated: 04/24/23 1022    Narrative:      X-Ray Report:  Study: X-rays ordered, taken in the office, and reviewed today.   Site: Right " knee Xray  Indication: Pain  View: AP/Lateral, Standing and Sunrise view(s)  Findings: No acute osseous abnormalities identified.  There is evidence of   prominent bone spurring present and mild joint space narrowing.  There is   medial patellar tilt appreciated.  Prior studies available for comparison: yes                     Assessment and Plan   Problem List Items Addressed This Visit    None  Visit Diagnoses     Osteoarthritis of right knee, unspecified osteoarthritis type    -  Primary    Relevant Orders    XR Knee 3 View Right (Completed)    MRI Knee Right Without Contrast    Effusion of right knee              Follow Up   Return for Recheck.  Patient is a non-smoker.  Did not discuss options for smoking cessation.    Patient Instructions   She is scheduled for left TKA on 5/26/2023 by Dr. LUZ ELENA Swartz, however, currently reports that right knee is significantly more painful than her left.  Patient with minimal relief with R knee injection see the last visit. X-rays R knee taken (with sunrise and standing views) and reviewed. Order for R Knee MRI placed. Follow up with results. R knee brace provided to patient.     Continue Celebrex, tylenol, and topical voltaren gel.     Follow up with MRI results. Call with changes or concerns.     Patient was given instructions and counseling regarding her condition or for health maintenance advice. Please see specific information pulled into the AVS if appropriate.

## 2023-05-01 ENCOUNTER — TELEPHONE (OUTPATIENT)
Dept: ORTHOPEDIC SURGERY | Facility: CLINIC | Age: 58
End: 2023-05-01
Payer: COMMERCIAL

## 2023-05-01 NOTE — TELEPHONE ENCOUNTER
Caller: OSCAR GONZALEZ    Relationship to patient: SELF    Best call back number: [phone]    Chief complaint: MRI FOLLOW UP RIGHT KNEE    Type of visit: MRI FOLLOW UP RIGHT KNEE    Requested date: NA    If rescheduling, when is the original appointment:  NA    Additional notes: PATIENT WANTS TO MAKE SURE SHE IS SCHEDULED PROPERLY FOR THIS PRIOR TO HER PREOP APPOINTMENT.

## 2023-05-08 ENCOUNTER — TRANSCRIBE ORDERS (OUTPATIENT)
Dept: ADMINISTRATIVE | Facility: HOSPITAL | Age: 58
End: 2023-05-08
Payer: COMMERCIAL

## 2023-05-08 DIAGNOSIS — Z12.31 SCREENING MAMMOGRAM FOR BREAST CANCER: Primary | ICD-10-CM

## 2023-05-09 DIAGNOSIS — Z96.652 AFTERCARE FOLLOWING LEFT KNEE JOINT REPLACEMENT SURGERY: Primary | ICD-10-CM

## 2023-05-09 DIAGNOSIS — Z47.1 AFTERCARE FOLLOWING LEFT KNEE JOINT REPLACEMENT SURGERY: Primary | ICD-10-CM

## 2023-05-10 ENCOUNTER — OFFICE VISIT (OUTPATIENT)
Dept: FAMILY MEDICINE CLINIC | Facility: CLINIC | Age: 58
End: 2023-05-10
Payer: COMMERCIAL

## 2023-05-10 VITALS
HEART RATE: 73 BPM | RESPIRATION RATE: 16 BRPM | TEMPERATURE: 97.8 F | BODY MASS INDEX: 29.85 KG/M2 | OXYGEN SATURATION: 98 % | SYSTOLIC BLOOD PRESSURE: 150 MMHG | DIASTOLIC BLOOD PRESSURE: 78 MMHG | HEIGHT: 67 IN | WEIGHT: 190.2 LBS

## 2023-05-10 DIAGNOSIS — L60.0 INGROWING NAIL WITH INFECTION: ICD-10-CM

## 2023-05-10 DIAGNOSIS — L98.9 SKIN LESION: Primary | ICD-10-CM

## 2023-05-10 RX ORDER — CEPHALEXIN 500 MG/1
500 CAPSULE ORAL 2 TIMES DAILY
Qty: 20 CAPSULE | Refills: 0 | Status: SHIPPED | OUTPATIENT
Start: 2023-05-10

## 2023-05-10 NOTE — PROGRESS NOTES
Answers for HPI/ROS submitted by the patient on 5/3/2023  Please describe your symptoms.: None  Have you had these symptoms before?: No  How long have you been having these symptoms?: Greater than 2 weeks  What is the primary reason for your visit?: Other    Chief Complaint  Toe Pain (Right great toe gets red sometimes and sore )    Subjective          Amber Poole presents to [facility]  History of Present Illness  She is having toe pain on the right great toe.  She said that they were walking through the [employer] store and she hit her toe a little bit ago and it was red and swollen now it just gets irritated in the corner.  There is redness at times.  There is no oozing noted.  But there is tenderness noted.  She does trim her own toenails.  She has several skin tags noted around the neck and has been to dermatology in the past.      Allergies  Codeine and Penicillins    Social History     Tobacco Use   • Smoking status: Never   • Smokeless tobacco: Never   Vaping Use   • Vaping Use: Never used   Substance Use Topics   • Alcohol use: Never   • Drug use: Never       Family History   Problem Relation Age of Onset   • Diabetes Mother    • Kidney nephrosis Mother    • Heart disease Father    • Vision loss Father    • Macular degeneration Father    • Kidney nephrosis Maternal Grandmother    • Other Maternal Grandmother         BLADDER CALCULUS   • Liver cancer Other         UNSPECIFIED   • Lung cancer Other         UNSPECIFIED   • Cancer Daughter    • Leukemia Daughter    • Leukemia Son    • Macular degeneration Sister         Health Maintenance Due   Topic Date Due   • COVID-19 Vaccine (1) Never done   • ZOSTER VACCINE (1 of 2) Never done   • ANNUAL PHYSICAL  Never done        Immunization History   Administered Date(s) Administered   • Influenza, Unspecified 10/01/2020   • Tdap 08/06/2018       Review of Systems   Skin: Positive for dry skin.        Objective       Vitals:     "05/10/23 0750 05/10/23 0752   BP: 146/85 150/78   Pulse: 73    Resp: 16    Temp: 97.8 °F (36.6 °C)    SpO2: 98%    Weight: 86.3 kg (190 lb 3.2 oz)    Height: 170.2 cm (67\")        Body mass index is 29.79 kg/m².         Physical Exam  Vitals reviewed.   Constitutional:       Appearance: Normal appearance. She is well-developed.   HENT:      Head: Normocephalic.   Pulmonary:      Effort: Pulmonary effort is normal.   Skin:     Findings: No bruising.      Comments: There is a small callus on the right corner of the great toe.  There is tenderness noticed on exam.  She also has several skin tags around the neck and the shirt line.     Neurological:      General: No focal deficit present.      Mental Status: She is alert and oriented to person, place, and time.      Cranial Nerves: No cranial nerve deficit.      Motor: No weakness.   Psychiatric:         Mood and Affect: Mood and affect normal.         Behavior: Behavior normal.         Thought Content: Thought content normal.         Judgment: Judgment normal.             Result Review :     The following data was reviewed by: SHAILESH Colvin on 05/10/2023:                     Assessment and Plan      Diagnoses and all orders for this visit:    1. Skin lesion (Primary)  -     Ambulatory Referral to Dermatology    2. Ingrowing nail with infection  -     cephalexin (Keflex) 500 MG capsule; Take 1 capsule by mouth 2 (Two) Times a Day.  Dispense: 20 capsule; Refill: 0  -     mupirocin (BACTROBAN) 2 % nasal ointment; 1 application into the nostril(s) as directed by provider 3 (Three) Times a Day.  Dispense: 30 g; Refill: 0            Follow Up     Return if symptoms worsen or fail to improve.  We will do abx and cream.  Soak 10-15 mins with epsom salt and then place the ointment.  We will refer to derm.  Call with any concerns or questions.    Patient was given instructions and counseling regarding her condition or for health maintenance advice. Please see " specific information pulled into the AVS if appropriate.     Parts of this note are electronic transcriptions/translations of spoken language to printed text using the Dragon Dictation system.          Faith Ramos, SHAILESH  05/10/2023

## 2023-05-12 ENCOUNTER — HOSPITAL ENCOUNTER (OUTPATIENT)
Dept: MRI IMAGING | Facility: HOSPITAL | Age: 58
Discharge: HOME | End: 2023-05-12
Payer: COMMERCIAL

## 2023-05-12 DIAGNOSIS — M17.11 OSTEOARTHRITIS OF RIGHT KNEE, UNSPECIFIED OSTEOARTHRITIS TYPE: ICD-10-CM

## 2023-05-12 PROCEDURE — 73721 MRI JNT OF LWR EXTRE W/O DYE: CPT

## 2023-05-15 ENCOUNTER — PREP FOR SURGERY (OUTPATIENT)
Dept: OTHER | Facility: HOSPITAL | Age: 58
End: 2023-05-15
Payer: COMMERCIAL

## 2023-05-15 ENCOUNTER — OFFICE VISIT (OUTPATIENT)
Dept: ORTHOPEDIC SURGERY | Facility: CLINIC | Age: 58
End: 2023-05-15
Payer: COMMERCIAL

## 2023-05-15 VITALS
BODY MASS INDEX: 28.41 KG/M2 | SYSTOLIC BLOOD PRESSURE: 138 MMHG | DIASTOLIC BLOOD PRESSURE: 88 MMHG | HEIGHT: 67 IN | WEIGHT: 181 LBS | OXYGEN SATURATION: 95 % | HEART RATE: 79 BPM

## 2023-05-15 DIAGNOSIS — M17.12 PRIMARY OSTEOARTHRITIS OF LEFT KNEE: ICD-10-CM

## 2023-05-15 DIAGNOSIS — S83.281S ACUTE LATERAL MENISCUS TEAR OF RIGHT KNEE, SEQUELA: Primary | ICD-10-CM

## 2023-05-15 DIAGNOSIS — M17.11 OSTEOARTHRITIS OF RIGHT KNEE, UNSPECIFIED OSTEOARTHRITIS TYPE: ICD-10-CM

## 2023-05-15 PROBLEM — M17.0 BILATERAL PRIMARY OSTEOARTHRITIS OF KNEE: Status: ACTIVE | Noted: 2023-02-22

## 2023-05-15 RX ORDER — CEFAZOLIN SODIUM 2 G/100ML
2 INJECTION, SOLUTION INTRAVENOUS ONCE
OUTPATIENT
Start: 2023-05-15 | End: 2023-05-15

## 2023-05-15 RX ORDER — TRANEXAMIC ACID 10 MG/ML
1000 INJECTION, SOLUTION INTRAVENOUS ONCE
OUTPATIENT
Start: 2023-05-15 | End: 2023-05-15

## 2023-05-15 RX ORDER — CEFAZOLIN SODIUM IN 0.9 % NACL 3 G/100 ML
3 INTRAVENOUS SOLUTION, PIGGYBACK (ML) INTRAVENOUS ONCE
OUTPATIENT
Start: 2023-05-15 | End: 2023-05-15

## 2023-05-15 NOTE — PROGRESS NOTES
"Chief Complaint  Follow-up of the Right Knee     Subjective      Amber Poole presents to [facility] for follow up of the right knee.  She is here for MRI results.  She had a right knee injection on 4/12/23 that gave little relief. To review  She has been cleaning the house and then she has been walking 2 miles a day.  She even tried to do some abdominal exercises with jumping and twisting and she did not know if that potentially aggravated the right knee.  Her pain has been since March 25 th.  She is having a left total knee arthroplasty on 5/26/23.      Allergies   Allergen Reactions   • Codeine Anaphylaxis and Rash   • Penicillins Rash        Social History     Socioeconomic History   • Marital status:    Tobacco Use   • Smoking status: Never   • Smokeless tobacco: Never   Vaping Use   • Vaping Use: Never used   Substance and Sexual Activity   • Alcohol use: Never   • Drug use: Never   • Sexual activity: Defer        Review of Systems     Objective   Vital Signs:   /88 (BP Location: Left arm)   Pulse 79   Ht 170.2 cm (67\")   Wt 82.1 kg (181 lb)   SpO2 95%   BMI 28.35 kg/m²       Physical Exam  Constitutional:       Appearance: Normal appearance. Patient is well-developed and normal weight.   HENT:      Head: Normocephalic.      Right Ear: Hearing and external ear normal.      Left Ear: Hearing and external ear normal.      Nose: Nose normal.   Eyes:      Conjunctiva/sclera: Conjunctivae normal.   Cardiovascular:      Rate and Rhythm: Normal rate.   Pulmonary:      Effort: Pulmonary effort is normal.      Breath sounds: No wheezing or rales.   Abdominal:      Palpations: Abdomen is soft.      Tenderness: There is no abdominal tenderness.   Musculoskeletal:      Cervical back: Normal range of motion.   Skin:     Findings: No rash.   Neurological:      Mental Status: Patient  is alert and oriented to person, place, and time.   Psychiatric:         Mood and " Affect: Mood and affect normal.         Judgment: Judgment normal.       Ortho Exam      RIGHT KNEE Flexion 125. Extension 0. Stable to varus/valgus stress. Stable to anterior/posterior drawer. Neurovascularly intact. Negative Kane. Negative Lachman. Positive EHL, FHL, HS and TA. Sensation intact to light touch all 5 nerves of the foot. Ambulates with Antalgic gait. Patella is well tracking. Calf supple, non-tender. Positive tenderness to the medial joint line. Positive tenderness to the lateral joint line. Positive Crepitus. Good strength to hamstrings, quadriceps, dorsiflexors, and plantar flexors.  Knee Extensor Mechanism intact         Procedures        Imaging Results (Most Recent)     None           Result Review :       XR Knee 3 View Right    Result Date: 4/24/2023  Narrative: X-Ray Report: Study: X-rays ordered, taken in the office, and reviewed today. Site: Right knee Xray Indication: Pain View: AP/Lateral, Standing and Sunrise view(s) Findings: No acute osseous abnormalities identified.  There is evidence of prominent bone spurring present and mild joint space narrowing.  There is medial patellar tilt appreciated. Prior studies available for comparison: yes     MRI Knee Right Without Contrast    Result Date: 5/12/2023  Narrative: PROCEDURE: MRI KNEE RIGHT  WO CONTRAST  COMPARISON: None  INDICATIONS: RIGHT KNEE PAIN      TECHNIQUE: A complete multi-planar MRI was performed.   FINDINGS:  The cruciate ligaments, collateral ligaments, and extensor mechanism of the knee are intact. There is no evidence for medial or lateral meniscal tear.  There is a moderate to large sized joint effusion.  There is no Baker's cyst.  There is a complex tear of the anterior horn and body of the lateral meniscus with extrusion seen extending cranially (series 501, image 16).  There is degeneration of the medial meniscus with no evidence of a focal tear.  Fluid is seen tracking along the patellar tendon.  No evidence of a  focal tear.  Full-thickness cartilage loss is seen along the lateral facet of the patellar cartilage extending to the apex with underlying osseous edema and subchondral cystic changes.  Full-thickness cartilage loss is present within the lateral trochlea.  Partial thickness fissures are present within the medial compartment.  Full-thickness cartilage loss is seen overlying the lateral femoral condyle measuring up to 8 mm in transverse dimension.  An intra-articular body seen within the posterior joint recess measuring up to 1.2 cm (series 1, image 19).  No significant reactive edema within this region.  Reactive edema is present within the lateral tibial plateau with diffuse cartilage loss present within this region.  The cortical margins are intact. No significant abnormal focal fluid collection is observed within the surrounding soft tissues.      Impression:   1. Complex tear of the anterior horn and body of the lateral meniscus with significant extrusion.  2. Moderate tricompartmental osteoarthritis, most pronounced along the lateral facet and apex of the patellar cartilage and overlying the lateral femoral condyle.  An intra-articular body is present within the posterior joint recess. 3. Patellar tendinitis. 4. Moderate to large sized joint effusion.     TERESA MEHTA MD       Electronically Signed and Approved By: TERESA METHA MD on 5/12/2023 at 15:16                      Assessment and Plan     Diagnoses and all orders for this visit:    1. Acute lateral meniscus tear of right knee, sequela (Primary)    2. Osteoarthritis of right knee, unspecified osteoarthritis type        Discussed the treatment plan with the patient. I reviewed the MRI results with the patient. Discussed the treatment options with the patient, operative vs non-operative. Discussed the treatment options with the patient, operative vs non-operative. The patient expressed understanding and wished to proceed with a right total knee arthroplasty  .    .    Discussed surgery., Risks/benefits discussed with patient including, but not limited to: infection, bleeding, neurovascular damage, re-rupture, aesthetic deformity, need for further surgery, and death., Discussed with patient the implant type being used during surgery and patient understands., Surgery pamphlet given., Call or return if worsening symptoms. and DME order for a 3 in 1 given today due to patient will be confined to one room/level of the home that does not offer a toilet during postop recovery.     Follow Up     2 weeks postoperatively       Patient was given instructions and counseling regarding her condition or for health maintenance advice. Please see specific information pulled into the AVS if appropriate.     Scribed for Carl Swartz MD by Leonila Sol MA.  05/15/23   09:28 EDT      I have personally performed the services described in this document as scribed by the above individual and it is both accurate and complete. Carl Swartz MD 05/16/23

## 2023-05-16 ENCOUNTER — PRE-ADMISSION TESTING (OUTPATIENT)
Dept: PREADMISSION TESTING | Facility: HOSPITAL | Age: 58
End: 2023-05-16
Payer: COMMERCIAL

## 2023-05-16 VITALS — HEIGHT: 67 IN | BODY MASS INDEX: 29.52 KG/M2 | WEIGHT: 188.05 LBS

## 2023-05-16 DIAGNOSIS — M17.12 PRIMARY OSTEOARTHRITIS OF LEFT KNEE: ICD-10-CM

## 2023-05-16 LAB
ALBUMIN SERPL-MCNC: 4 G/DL (ref 3.5–5.2)
ALBUMIN/GLOB SERPL: 1.4 G/DL
ALP SERPL-CCNC: 66 U/L (ref 39–117)
ALT SERPL W P-5'-P-CCNC: 11 U/L (ref 1–33)
ANION GAP SERPL CALCULATED.3IONS-SCNC: 12.3 MMOL/L (ref 5–15)
AST SERPL-CCNC: 13 U/L (ref 1–32)
BASOPHILS # BLD AUTO: 0.05 10*3/MM3 (ref 0–0.2)
BASOPHILS NFR BLD AUTO: 0.5 % (ref 0–1.5)
BILIRUB SERPL-MCNC: 0.2 MG/DL (ref 0–1.2)
BUN SERPL-MCNC: 18 MG/DL (ref 6–20)
BUN/CREAT SERPL: 27.7 (ref 7–25)
CALCIUM SPEC-SCNC: 9.2 MG/DL (ref 8.6–10.5)
CHLORIDE SERPL-SCNC: 107 MMOL/L (ref 98–107)
CO2 SERPL-SCNC: 21.7 MMOL/L (ref 22–29)
CREAT SERPL-MCNC: 0.65 MG/DL (ref 0.57–1)
DEPRECATED RDW RBC AUTO: 43.3 FL (ref 37–54)
EGFRCR SERPLBLD CKD-EPI 2021: 104.1 ML/MIN/1.73
EOSINOPHIL # BLD AUTO: 0.3 10*3/MM3 (ref 0–0.4)
EOSINOPHIL NFR BLD AUTO: 3.3 % (ref 0.3–6.2)
ERYTHROCYTE [DISTWIDTH] IN BLOOD BY AUTOMATED COUNT: 13.2 % (ref 12.3–15.4)
GLOBULIN UR ELPH-MCNC: 2.8 GM/DL
GLUCOSE SERPL-MCNC: 139 MG/DL (ref 65–99)
HBA1C MFR BLD: 5.6 % (ref 4.8–5.6)
HCT VFR BLD AUTO: 40.6 % (ref 34–46.6)
HGB BLD-MCNC: 13.6 G/DL (ref 12–15.9)
IMM GRANULOCYTES # BLD AUTO: 0.04 10*3/MM3 (ref 0–0.05)
IMM GRANULOCYTES NFR BLD AUTO: 0.4 % (ref 0–0.5)
INR PPP: 0.99 (ref 0.86–1.15)
LYMPHOCYTES # BLD AUTO: 2.04 10*3/MM3 (ref 0.7–3.1)
LYMPHOCYTES NFR BLD AUTO: 22.1 % (ref 19.6–45.3)
MCH RBC QN AUTO: 29.8 PG (ref 26.6–33)
MCHC RBC AUTO-ENTMCNC: 33.5 G/DL (ref 31.5–35.7)
MCV RBC AUTO: 89 FL (ref 79–97)
MONOCYTES # BLD AUTO: 0.57 10*3/MM3 (ref 0.1–0.9)
MONOCYTES NFR BLD AUTO: 6.2 % (ref 5–12)
NEUTROPHILS NFR BLD AUTO: 6.23 10*3/MM3 (ref 1.7–7)
NEUTROPHILS NFR BLD AUTO: 67.5 % (ref 42.7–76)
NRBC BLD AUTO-RTO: 0 /100 WBC (ref 0–0.2)
PLATELET # BLD AUTO: 275 10*3/MM3 (ref 140–450)
PMV BLD AUTO: 10.2 FL (ref 6–12)
POTASSIUM SERPL-SCNC: 4.1 MMOL/L (ref 3.5–5.2)
PROT SERPL-MCNC: 6.8 G/DL (ref 6–8.5)
PROTHROMBIN TIME: 13.2 SECONDS (ref 11.8–14.9)
QT INTERVAL: 399 MS
RBC # BLD AUTO: 4.56 10*6/MM3 (ref 3.77–5.28)
SODIUM SERPL-SCNC: 141 MMOL/L (ref 136–145)
WBC NRBC COR # BLD: 9.23 10*3/MM3 (ref 3.4–10.8)

## 2023-05-16 PROCEDURE — 83036 HEMOGLOBIN GLYCOSYLATED A1C: CPT

## 2023-05-16 PROCEDURE — 36415 COLL VENOUS BLD VENIPUNCTURE: CPT

## 2023-05-16 PROCEDURE — 93005 ELECTROCARDIOGRAM TRACING: CPT

## 2023-05-16 PROCEDURE — 85025 COMPLETE CBC W/AUTO DIFF WBC: CPT

## 2023-05-16 PROCEDURE — 80053 COMPREHEN METABOLIC PANEL: CPT

## 2023-05-16 PROCEDURE — 85610 PROTHROMBIN TIME: CPT

## 2023-05-16 RX ORDER — DOCUSATE SODIUM 250 MG
250 CAPSULE ORAL DAILY
COMMUNITY
End: 2023-05-16

## 2023-05-16 RX ORDER — NORETHINDRONE ACETATE AND ETHINYL ESTRADIOL 1; 5 MG/1; UG/1
TABLET ORAL DAILY
COMMUNITY

## 2023-05-16 NOTE — SIGNIFICANT NOTE
PT WOULD LIKE TO DO HOME HEALTH FOR 2 WEEKS THEN GO OUTPT REHAB AT [facility] PT [facility].  PT  CAN TRANSPORT HER TO AND FROM THERAPY.       DME ASSISTANCE NEEDED FOR WALKER, SHOWER CHAIR        PT ALREADY HAS ELEVATED TOILET SEAT & CANE    JOINT CLASS 5-16-23  [facility] 5-16-23    GOAL: BE ABLE TO WALK WITH LESS PAIN. AND PLAY WITH GRANDKIDS

## 2023-05-16 NOTE — DISCHARGE INSTRUCTIONS
IMPORTANT INSTRUCTIONS - PRE-ADMISSION TESTING  DO NOT EAT OR CHEW anything after midnight the night before your procedure.    You may have CLEAR liquids up to _3_____ hours prior to ARRIVAL time.   Take the following medications the morning of your procedure with JUST A SIP OF WATER:        CLARITIN, CYTOMEL, FEMHRT, SYNTHROID, STOOL SOFTENER, LEXAPRO, COLACE, KEFLEX    DO NOT BRING your medications to the hospital with you, UNLESS something has changed since your PRE-Admission Testing appointment.  Hold all vitamins, supplements, and NSAIDS (Non- steroidal anti-inflammatory meds) for one week prior to surgery (you MAY take Tylenol or Acetaminophen). STOP 5-19-23                   STOP: ZINC, VITAMIN C, VITAMIN D, CELEBREX, DICLOFENAC GEL, ASPIRIN     If you are diabetic, check your blood sugar the morning of your procedure. If it is less than 70 or if you are feeling symptomatic, call the following number for further instructions: [phone]_.  Use your inhalers/nebulizers as usual, the morning of your procedure. BRING YOUR INHALERS with you.   Bring your CPAP or BIPAP to hospital, ONLY IF YOU WILL BE SPENDING THE NIGHT.   Make sure you have a ride home and have someone who will stay with you the day of your procedure after you go home.  If you have any questions, please call your Pre-Admission Testing Nurse, __NOMAN MALDONADO at [phone]__.   Per anesthesia request, do not smoke for 24 hours before your procedure or as instructed by your surgeon.       SURGERY 5-26-23 ELEVATOR A, THIRD FLOOR  WILL CALL ARRIVAL TIME THE DAY PRIOR TO SURGERY AFTER 1PM   USE SURGICAL SOAP 3 TIMES INSTRUCTED BELOW  DRINK 20 OZ GATORADE 3 HOURS PRIOR TO SURGERY ARRIVAL ( NO RED)  BRING JOINT BOOKLET ON DAY OF SURGERY  PURCHASE GATORADE AND STOOL SOFTENER PRIOR TO SURGERY (USE STOOL SOFTENER AFTER SURGERY)    PREOPERATIVE (BEFORE SURGERY)              BATHING INSTRUCTIONS  Instructions:    You will need to shower 3 separate times  utilizing the soap provided; at the times indicated   below:     5-24-23 WED PM   5-25-23 THUR AM  5-25-23 THUR PM     Wash your hair and face with normal shampoo and soap, rinse it well before using the surgical soap.      In the shower, wet the skin completely with water from your neck to your feet. Apply the cleanser to your   body ONLY FROM THE NECK TO YOUR FEET.     Do NOT USE THE CLEANSER ON YOUR FACE, HEAD, OR GENITAL (PRIVATE) AREAS.   Keep it out of your eyes, ears, and mouth because of the risk of injury to those areas.      Scrub with a clean washcloth for each bath utilizing the soap provided from the top of your body to the   bottom starting at the neck area.      Pay close attention to your armpits, groin area, and the site of surgery.      Wash your body gently for 5 minutes. Stand outside the stream or turn off the water while scrubbing your   body. Do NOT wash with your regular soap after the surgical cleanser is used.      RINSE THE CLEANSER OFF COMPLETELY with plenty of water. Rinse the area again thoroughly.      Dry off with a clean towel. The surgical soap can cause dryness; however do NOT APPLY LOTION,   CREAM, POWDER, and/or DEODORANT AFTER SHOWERING.     Be sure to where clean clothes after showering.      Ensure CLEAN BED LINENS AFTER FIRST wash with the surgical soap. - CHANGE SHEETS ON WED PM     NO PETS ALLOWED IN THE BED with you after utilizing the surgical soap.     Clear Liquid Diet        Find out when you need to start a clear liquid diet.   Think of “clear liquids” as anything you could read a newspaper through. This includes things like water, broth, sports drinks, or tea WITHOUT any kind of milk or cream.           Once you are told to start a clear liquid diet, only drink these things until 3 hours before arrival to the hospital or when the hospital says to stop. Total volume limitation: 8 oz.       Clear liquids you CAN drink:   Water   Clear broth: beef, chicken, vegetable,  or bone broth with nothing in it   Gatorade   Lemonade or Bryce-aid   Soda   Tea, coffee (NO cream or honey)   Jell-O (without fruit)   Popsicles (without fruit or cream)   Italian ices   Juice without pulp: apple, white, grape   You may use salt, pepper, and sugar\   NO RED    Do NOT drink:   Milk or cream   Soy milk, almond milk, coconut milk, or other non-dairy drinks and   creamers   Milkshakes or smoothies   Tomato juice   Orange juice   Grapefruit juice   Cream soups or any other than broth         Clear Liquid Diet:  Do NOT eat any solid food.  Do NOT eat or suck on mints or candy.  Do NOT chew gum.  Do NOT drink thick liquids like milk or juice with pulp in it.  Do NOT add milk, cream, or anything like soy milk or almond milk to coffee or tea.

## 2023-05-23 NOTE — NURSING NOTE
PT CALLED BACK AND STATED SOMEONE FROM [facility] CALLED AND STATED DUE TO HER LOCATION WHERE SHE LIVES SHE NEEDED TO NOTIFY [facility] REGARDING HOME HEALTH AGENCY WHO [facility].     NOTIFIED [facility] PT REQUEST SHE NOW ANTS TO DO PT HOME HEALTH FOR 1 WEEK (NOT 2 WEEKS ) THEN GO OUTPT REHAB AT [facility] PT [facility].  PT  CAN TRANSPORT HER TO AND FROM THERAPY.         DME ASSISTANCE NEEDED FOR SHOWER CHAIR (NOT ONE CONNECTED TO TOILET SEAT) AND WALKER                                                    PT ALREADY HAS AN ELEVATED TOILET AND CANE

## 2023-05-24 DIAGNOSIS — Z47.1 AFTERCARE FOLLOWING BILATERAL KNEE JOINT REPLACEMENT SURGERY: Primary | ICD-10-CM

## 2023-05-24 DIAGNOSIS — Z96.653 AFTERCARE FOLLOWING BILATERAL KNEE JOINT REPLACEMENT SURGERY: Primary | ICD-10-CM

## 2023-05-25 NOTE — NURSING NOTE
PT CALLED AND STATED TALKED WITH [facility] REGARDING THERAPY AND CHANGING TO HOME HEALTH 1 WEEK THEN OUTPT REHAB. PT STATED HER INSURANCE WOULD NOT COVER HOME HEALTH SO SHE IS WANTING TO NOW DO TO THERAPY AT [facility] SINCE HER INSURANCE WILL COVER IT THERE. SHE STATED SHE IS SUPPOSE TO BE SCHEDULED FOR APPT ON 5-31-23.

## 2023-05-26 ENCOUNTER — HOSPITAL ENCOUNTER (OUTPATIENT)
Facility: HOSPITAL | Age: 58
Discharge: HOME | End: 2023-05-28
Attending: ORTHOPAEDIC SURGERY | Admitting: ORTHOPAEDIC SURGERY
Payer: COMMERCIAL

## 2023-05-26 ENCOUNTER — APPOINTMENT (OUTPATIENT)
Dept: GENERAL RADIOLOGY | Facility: HOSPITAL | Age: 58
End: 2023-05-26
Payer: COMMERCIAL

## 2023-05-26 ENCOUNTER — ANESTHESIA (OUTPATIENT)
Dept: PERIOP | Facility: HOSPITAL | Age: 58
End: 2023-05-26
Payer: COMMERCIAL

## 2023-05-26 ENCOUNTER — ANESTHESIA EVENT (OUTPATIENT)
Dept: PERIOP | Facility: HOSPITAL | Age: 58
End: 2023-05-26
Payer: COMMERCIAL

## 2023-05-26 DIAGNOSIS — R26.2 DIFFICULTY WALKING: Primary | ICD-10-CM

## 2023-05-26 DIAGNOSIS — Z78.9 DECREASED ACTIVITIES OF DAILY LIVING (ADL): ICD-10-CM

## 2023-05-26 DIAGNOSIS — M17.12 PRIMARY OSTEOARTHRITIS OF LEFT KNEE: ICD-10-CM

## 2023-05-26 DIAGNOSIS — M17.0 PRIMARY OSTEOARTHRITIS OF BOTH KNEES: ICD-10-CM

## 2023-05-26 LAB
DEPRECATED RDW RBC AUTO: 42.6 FL (ref 37–54)
ERYTHROCYTE [DISTWIDTH] IN BLOOD BY AUTOMATED COUNT: 13.1 % (ref 12.3–15.4)
HCT VFR BLD AUTO: 33.5 % (ref 34–46.6)
HGB BLD-MCNC: 11 G/DL (ref 12–15.9)
MCH RBC QN AUTO: 29.3 PG (ref 26.6–33)
MCHC RBC AUTO-ENTMCNC: 32.8 G/DL (ref 31.5–35.7)
MCV RBC AUTO: 89.3 FL (ref 79–97)
PLATELET # BLD AUTO: 248 10*3/MM3 (ref 140–450)
PMV BLD AUTO: 10.7 FL (ref 6–12)
RBC # BLD AUTO: 3.75 10*6/MM3 (ref 3.77–5.28)
WBC NRBC COR # BLD: 12.79 10*3/MM3 (ref 3.4–10.8)

## 2023-05-26 PROCEDURE — 73560 X-RAY EXAM OF KNEE 1 OR 2: CPT

## 2023-05-26 PROCEDURE — 25010000002 KETOROLAC TROMETHAMINE PER 15 MG: Performed by: ORTHOPAEDIC SURGERY

## 2023-05-26 PROCEDURE — 25010000002 ROPIVACAINE PER 1 MG: Performed by: ORTHOPAEDIC SURGERY

## 2023-05-26 PROCEDURE — 25010000002 CEFAZOLIN 1-4 GM/50ML-% SOLUTION: Performed by: ORTHOPAEDIC SURGERY

## 2023-05-26 PROCEDURE — 99203 OFFICE O/P NEW LOW 30 MIN: CPT | Performed by: FAMILY MEDICINE

## 2023-05-26 PROCEDURE — 25010000002 DEXAMETHASONE PER 1 MG

## 2023-05-26 PROCEDURE — 0 MEPERIDINE PER 100 MG

## 2023-05-26 PROCEDURE — 20985 CPTR-ASST DIR MS PX: CPT | Performed by: ORTHOPAEDIC SURGERY

## 2023-05-26 PROCEDURE — 27447 TOTAL KNEE ARTHROPLASTY: CPT | Performed by: ORTHOPAEDIC SURGERY

## 2023-05-26 PROCEDURE — 25010000002 ONDANSETRON PER 1 MG

## 2023-05-26 PROCEDURE — 85027 COMPLETE CBC AUTOMATED: CPT | Performed by: ORTHOPAEDIC SURGERY

## 2023-05-26 PROCEDURE — 25010000002 ONDANSETRON PER 1 MG: Performed by: ORTHOPAEDIC SURGERY

## 2023-05-26 PROCEDURE — 25010000002 KETOROLAC TROMETHAMINE PER 15 MG

## 2023-05-26 PROCEDURE — 25010000002 ROPIVACAINE PER 1 MG: Performed by: ANESTHESIOLOGY

## 2023-05-26 PROCEDURE — 25010000002 MIDAZOLAM PER 1MG: Performed by: ANESTHESIOLOGY

## 2023-05-26 PROCEDURE — 25010000002 EPINEPHRINE 1 MG/ML SOLUTION: Performed by: ORTHOPAEDIC SURGERY

## 2023-05-26 PROCEDURE — C1713 ANCHOR/SCREW BN/BN,TIS/BN: HCPCS | Performed by: ORTHOPAEDIC SURGERY

## 2023-05-26 PROCEDURE — 25010000002 CEFAZOLIN IN DEXTROSE 2-4 GM/100ML-% SOLUTION: Performed by: ORTHOPAEDIC SURGERY

## 2023-05-26 PROCEDURE — 25010000002 HYDROMORPHONE PER 4 MG: Performed by: ORTHOPAEDIC SURGERY

## 2023-05-26 PROCEDURE — 97161 PT EVAL LOW COMPLEX 20 MIN: CPT

## 2023-05-26 PROCEDURE — 25010000002 PROPOFOL 10 MG/ML EMULSION

## 2023-05-26 PROCEDURE — 25010000002 HYDROMORPHONE 1 MG/ML SOLUTION

## 2023-05-26 PROCEDURE — C1776 JOINT DEVICE (IMPLANTABLE): HCPCS | Performed by: ORTHOPAEDIC SURGERY

## 2023-05-26 PROCEDURE — 25010000002 FENTANYL CITRATE (PF) 50 MCG/ML SOLUTION

## 2023-05-26 PROCEDURE — S0260 H&P FOR SURGERY: HCPCS | Performed by: ORTHOPAEDIC SURGERY

## 2023-05-26 PROCEDURE — 94761 N-INVAS EAR/PLS OXIMETRY MLT: CPT

## 2023-05-26 PROCEDURE — 25010000002 SUGAMMADEX 200 MG/2ML SOLUTION

## 2023-05-26 PROCEDURE — 94799 UNLISTED PULMONARY SVC/PX: CPT

## 2023-05-26 DEVICE — STEM TIB/KN PERSONA CMT 5D SZD RT: Type: IMPLANTABLE DEVICE | Site: KNEE | Status: FUNCTIONAL

## 2023-05-26 DEVICE — STEM TIB/KN PERSONA CMT 5D SZD LT: Type: IMPLANTABLE DEVICE | Site: KNEE | Status: FUNCTIONAL

## 2023-05-26 DEVICE — CAP TOTL KN CMT PRIMARY W/ROSA: Type: IMPLANTABLE DEVICE | Status: FUNCTIONAL

## 2023-05-26 DEVICE — ART/SRF KN PERSONA/VE MC EF 6TO7 10MM RT: Type: IMPLANTABLE DEVICE | Site: KNEE | Status: FUNCTIONAL

## 2023-05-26 DEVICE — PAT KN PERSONA ALLPOLY CMT 8X29MM: Type: IMPLANTABLE DEVICE | Site: KNEE | Status: FUNCTIONAL

## 2023-05-26 DEVICE — COMP FEM/KN PERSONA CR CMT NRW SZ6 LT: Type: IMPLANTABLE DEVICE | Site: KNEE | Status: FUNCTIONAL

## 2023-05-26 DEVICE — CMT BONE PALACOS R HI/VISC 1X40: Type: IMPLANTABLE DEVICE | Site: KNEE | Status: FUNCTIONAL

## 2023-05-26 DEVICE — COMP FEM/KN PERSONA CR CMT NRW SZ6 RT: Type: IMPLANTABLE DEVICE | Site: KNEE | Status: FUNCTIONAL

## 2023-05-26 DEVICE — ART/SRF KN PERSONA/VE PS CD/CR 6TO7 10MM LT: Type: IMPLANTABLE DEVICE | Site: KNEE | Status: FUNCTIONAL

## 2023-05-26 RX ORDER — SODIUM CHLORIDE, SODIUM LACTATE, POTASSIUM CHLORIDE, CALCIUM CHLORIDE 600; 310; 30; 20 MG/100ML; MG/100ML; MG/100ML; MG/100ML
9 INJECTION, SOLUTION INTRAVENOUS CONTINUOUS PRN
Status: DISCONTINUED | OUTPATIENT
Start: 2023-05-26 | End: 2023-05-26 | Stop reason: HOSPADM

## 2023-05-26 RX ORDER — CELECOXIB 100 MG/1
200 CAPSULE ORAL ONCE
Status: COMPLETED | OUTPATIENT
Start: 2023-05-26 | End: 2023-05-26

## 2023-05-26 RX ORDER — BISACODYL 10 MG
10 SUPPOSITORY, RECTAL RECTAL DAILY PRN
Status: DISCONTINUED | OUTPATIENT
Start: 2023-05-26 | End: 2023-05-28 | Stop reason: HOSPADM

## 2023-05-26 RX ORDER — CEFAZOLIN SODIUM 1 G/50ML
1 INJECTION, SOLUTION INTRAVENOUS ONCE
Status: COMPLETED | OUTPATIENT
Start: 2023-05-26 | End: 2023-05-26

## 2023-05-26 RX ORDER — SODIUM CHLORIDE 0.9 % (FLUSH) 0.9 %
10 SYRINGE (ML) INJECTION AS NEEDED
Status: DISCONTINUED | OUTPATIENT
Start: 2023-05-26 | End: 2023-05-28 | Stop reason: HOSPADM

## 2023-05-26 RX ORDER — BISACODYL 5 MG/1
10 TABLET, DELAYED RELEASE ORAL DAILY PRN
Status: DISCONTINUED | OUTPATIENT
Start: 2023-05-26 | End: 2023-05-28 | Stop reason: HOSPADM

## 2023-05-26 RX ORDER — FENTANYL CITRATE 50 UG/ML
INJECTION, SOLUTION INTRAMUSCULAR; INTRAVENOUS AS NEEDED
Status: DISCONTINUED | OUTPATIENT
Start: 2023-05-26 | End: 2023-05-26 | Stop reason: SURG

## 2023-05-26 RX ORDER — NALOXONE HCL 0.4 MG/ML
0.4 VIAL (ML) INJECTION
Status: DISCONTINUED | OUTPATIENT
Start: 2023-05-26 | End: 2023-05-28 | Stop reason: HOSPADM

## 2023-05-26 RX ORDER — PROPOFOL 10 MG/ML
VIAL (ML) INTRAVENOUS AS NEEDED
Status: DISCONTINUED | OUTPATIENT
Start: 2023-05-26 | End: 2023-05-26 | Stop reason: SURG

## 2023-05-26 RX ORDER — SODIUM CHLORIDE 9 MG/ML
40 INJECTION, SOLUTION INTRAVENOUS AS NEEDED
Status: DISCONTINUED | OUTPATIENT
Start: 2023-05-26 | End: 2023-05-26 | Stop reason: HOSPADM

## 2023-05-26 RX ORDER — SODIUM CHLORIDE 9 MG/ML
40 INJECTION, SOLUTION INTRAVENOUS AS NEEDED
Status: DISCONTINUED | OUTPATIENT
Start: 2023-05-26 | End: 2023-05-28 | Stop reason: HOSPADM

## 2023-05-26 RX ORDER — MIDAZOLAM HYDROCHLORIDE 2 MG/2ML
2 INJECTION, SOLUTION INTRAMUSCULAR; INTRAVENOUS ONCE
Status: COMPLETED | OUTPATIENT
Start: 2023-05-26 | End: 2023-05-26

## 2023-05-26 RX ORDER — PROMETHAZINE HYDROCHLORIDE 25 MG/1
25 SUPPOSITORY RECTAL ONCE AS NEEDED
Status: DISCONTINUED | OUTPATIENT
Start: 2023-05-26 | End: 2023-05-26 | Stop reason: HOSPADM

## 2023-05-26 RX ORDER — TRAZODONE HYDROCHLORIDE 50 MG/1
25 TABLET ORAL NIGHTLY PRN
Status: DISCONTINUED | OUTPATIENT
Start: 2023-05-26 | End: 2023-05-28 | Stop reason: HOSPADM

## 2023-05-26 RX ORDER — MEPERIDINE HYDROCHLORIDE 25 MG/ML
12.5 INJECTION INTRAMUSCULAR; INTRAVENOUS; SUBCUTANEOUS
Status: DISCONTINUED | OUTPATIENT
Start: 2023-05-26 | End: 2023-05-26 | Stop reason: HOSPADM

## 2023-05-26 RX ORDER — HYDROCODONE BITARTRATE AND ACETAMINOPHEN 7.5; 325 MG/1; MG/1
2 TABLET ORAL EVERY 4 HOURS PRN
Status: DISCONTINUED | OUTPATIENT
Start: 2023-05-26 | End: 2023-05-26

## 2023-05-26 RX ORDER — LEVOTHYROXINE SODIUM 0.15 MG/1
300 TABLET ORAL
Status: DISCONTINUED | OUTPATIENT
Start: 2023-05-27 | End: 2023-05-28 | Stop reason: HOSPADM

## 2023-05-26 RX ORDER — HYDROCODONE BITARTRATE AND ACETAMINOPHEN 7.5; 325 MG/1; MG/1
1 TABLET ORAL EVERY 4 HOURS PRN
Status: DISCONTINUED | OUTPATIENT
Start: 2023-05-26 | End: 2023-05-28 | Stop reason: HOSPADM

## 2023-05-26 RX ORDER — SCOLOPAMINE TRANSDERMAL SYSTEM 1 MG/1
1 PATCH, EXTENDED RELEASE TRANSDERMAL ONCE
Status: DISCONTINUED | OUTPATIENT
Start: 2023-05-26 | End: 2023-05-26

## 2023-05-26 RX ORDER — CEFAZOLIN SODIUM 2 G/100ML
2 INJECTION, SOLUTION INTRAVENOUS EVERY 8 HOURS
Status: COMPLETED | OUTPATIENT
Start: 2023-05-26 | End: 2023-05-27

## 2023-05-26 RX ORDER — OXYCODONE HYDROCHLORIDE 5 MG/1
5 TABLET ORAL
Status: DISCONTINUED | OUTPATIENT
Start: 2023-05-26 | End: 2023-05-26

## 2023-05-26 RX ORDER — ROCURONIUM BROMIDE 10 MG/ML
INJECTION, SOLUTION INTRAVENOUS AS NEEDED
Status: DISCONTINUED | OUTPATIENT
Start: 2023-05-26 | End: 2023-05-26 | Stop reason: SURG

## 2023-05-26 RX ORDER — KETOROLAC TROMETHAMINE 30 MG/ML
INJECTION, SOLUTION INTRAMUSCULAR; INTRAVENOUS AS NEEDED
Status: DISCONTINUED | OUTPATIENT
Start: 2023-05-26 | End: 2023-05-26 | Stop reason: SURG

## 2023-05-26 RX ORDER — ONDANSETRON 2 MG/ML
4 INJECTION INTRAMUSCULAR; INTRAVENOUS EVERY 6 HOURS PRN
Status: DISCONTINUED | OUTPATIENT
Start: 2023-05-26 | End: 2023-05-28 | Stop reason: HOSPADM

## 2023-05-26 RX ORDER — LIDOCAINE HYDROCHLORIDE 20 MG/ML
INJECTION, SOLUTION EPIDURAL; INFILTRATION; INTRACAUDAL; PERINEURAL AS NEEDED
Status: DISCONTINUED | OUTPATIENT
Start: 2023-05-26 | End: 2023-05-26 | Stop reason: SURG

## 2023-05-26 RX ORDER — PROMETHAZINE HYDROCHLORIDE 12.5 MG/1
25 TABLET ORAL ONCE AS NEEDED
Status: DISCONTINUED | OUTPATIENT
Start: 2023-05-26 | End: 2023-05-26 | Stop reason: HOSPADM

## 2023-05-26 RX ORDER — DEXAMETHASONE SODIUM PHOSPHATE 4 MG/ML
INJECTION, SOLUTION INTRA-ARTICULAR; INTRALESIONAL; INTRAMUSCULAR; INTRAVENOUS; SOFT TISSUE AS NEEDED
Status: DISCONTINUED | OUTPATIENT
Start: 2023-05-26 | End: 2023-05-26 | Stop reason: SURG

## 2023-05-26 RX ORDER — SODIUM CHLORIDE 0.9 % (FLUSH) 0.9 %
10 SYRINGE (ML) INJECTION EVERY 12 HOURS SCHEDULED
Status: DISCONTINUED | OUTPATIENT
Start: 2023-05-26 | End: 2023-05-28 | Stop reason: HOSPADM

## 2023-05-26 RX ORDER — HYDROCODONE BITARTRATE AND ACETAMINOPHEN 7.5; 325 MG/1; MG/1
1 TABLET ORAL EVERY 4 HOURS PRN
Status: DISCONTINUED | OUTPATIENT
Start: 2023-05-26 | End: 2023-05-26

## 2023-05-26 RX ORDER — ONDANSETRON 2 MG/ML
INJECTION INTRAMUSCULAR; INTRAVENOUS AS NEEDED
Status: DISCONTINUED | OUTPATIENT
Start: 2023-05-26 | End: 2023-05-26 | Stop reason: SURG

## 2023-05-26 RX ORDER — ESCITALOPRAM OXALATE 10 MG/1
10 TABLET ORAL DAILY
Status: DISCONTINUED | OUTPATIENT
Start: 2023-05-27 | End: 2023-05-28 | Stop reason: HOSPADM

## 2023-05-26 RX ORDER — GABAPENTIN 300 MG/1
300 CAPSULE ORAL ONCE
Status: COMPLETED | OUTPATIENT
Start: 2023-05-26 | End: 2023-05-26

## 2023-05-26 RX ORDER — ONDANSETRON 2 MG/ML
4 INJECTION INTRAMUSCULAR; INTRAVENOUS ONCE AS NEEDED
Status: DISCONTINUED | OUTPATIENT
Start: 2023-05-26 | End: 2023-05-26 | Stop reason: HOSPADM

## 2023-05-26 RX ORDER — SODIUM CHLORIDE, SODIUM LACTATE, POTASSIUM CHLORIDE, CALCIUM CHLORIDE 600; 310; 30; 20 MG/100ML; MG/100ML; MG/100ML; MG/100ML
80 INJECTION, SOLUTION INTRAVENOUS CONTINUOUS
Status: DISCONTINUED | OUTPATIENT
Start: 2023-05-26 | End: 2023-05-28 | Stop reason: HOSPADM

## 2023-05-26 RX ORDER — PHENYLEPHRINE HCL IN 0.9% NACL 1 MG/10 ML
SYRINGE (ML) INTRAVENOUS AS NEEDED
Status: DISCONTINUED | OUTPATIENT
Start: 2023-05-26 | End: 2023-05-26 | Stop reason: SURG

## 2023-05-26 RX ORDER — LIOTHYRONINE SODIUM 5 UG/1
10 TABLET ORAL
Status: DISCONTINUED | OUTPATIENT
Start: 2023-05-27 | End: 2023-05-28 | Stop reason: HOSPADM

## 2023-05-26 RX ORDER — DEXMEDETOMIDINE HYDROCHLORIDE 100 UG/ML
INJECTION, SOLUTION INTRAVENOUS AS NEEDED
Status: DISCONTINUED | OUTPATIENT
Start: 2023-05-26 | End: 2023-05-26 | Stop reason: SURG

## 2023-05-26 RX ORDER — HYDROCODONE BITARTRATE AND ACETAMINOPHEN 7.5; 325 MG/1; MG/1
2 TABLET ORAL EVERY 4 HOURS PRN
Status: DISCONTINUED | OUTPATIENT
Start: 2023-05-26 | End: 2023-05-28 | Stop reason: HOSPADM

## 2023-05-26 RX ORDER — CEFAZOLIN SODIUM 2 G/100ML
2 INJECTION, SOLUTION INTRAVENOUS ONCE
Status: COMPLETED | OUTPATIENT
Start: 2023-05-26 | End: 2023-05-26

## 2023-05-26 RX ORDER — EPHEDRINE SULFATE 50 MG/ML
INJECTION, SOLUTION INTRAVENOUS AS NEEDED
Status: DISCONTINUED | OUTPATIENT
Start: 2023-05-26 | End: 2023-05-26 | Stop reason: SURG

## 2023-05-26 RX ORDER — ACETAMINOPHEN 500 MG
1000 TABLET ORAL ONCE
Status: COMPLETED | OUTPATIENT
Start: 2023-05-26 | End: 2023-05-26

## 2023-05-26 RX ORDER — TRAMADOL HYDROCHLORIDE 50 MG/1
50 TABLET ORAL EVERY 4 HOURS PRN
Status: DISCONTINUED | OUTPATIENT
Start: 2023-05-26 | End: 2023-05-26

## 2023-05-26 RX ORDER — TRANEXAMIC ACID 10 MG/ML
1000 INJECTION, SOLUTION INTRAVENOUS ONCE
Status: DISCONTINUED | OUTPATIENT
Start: 2023-05-26 | End: 2023-05-26 | Stop reason: ALTCHOICE

## 2023-05-26 RX ORDER — CEFAZOLIN SODIUM IN 0.9 % NACL 3 G/100 ML
3 INTRAVENOUS SOLUTION, PIGGYBACK (ML) INTRAVENOUS ONCE
Status: DISCONTINUED | OUTPATIENT
Start: 2023-05-26 | End: 2023-05-26 | Stop reason: ALTCHOICE

## 2023-05-26 RX ORDER — KETOROLAC TROMETHAMINE 15 MG/ML
15 INJECTION, SOLUTION INTRAMUSCULAR; INTRAVENOUS EVERY 6 HOURS
Status: COMPLETED | OUTPATIENT
Start: 2023-05-26 | End: 2023-05-27

## 2023-05-26 RX ORDER — ATORVASTATIN CALCIUM 10 MG/1
20 TABLET, FILM COATED ORAL DAILY
Status: DISCONTINUED | OUTPATIENT
Start: 2023-05-27 | End: 2023-05-28 | Stop reason: HOSPADM

## 2023-05-26 RX ORDER — SODIUM CHLORIDE 0.9 % (FLUSH) 0.9 %
10 SYRINGE (ML) INJECTION AS NEEDED
Status: DISCONTINUED | OUTPATIENT
Start: 2023-05-26 | End: 2023-05-26 | Stop reason: HOSPADM

## 2023-05-26 RX ORDER — MAGNESIUM HYDROXIDE 1200 MG/15ML
LIQUID ORAL AS NEEDED
Status: DISCONTINUED | OUTPATIENT
Start: 2023-05-26 | End: 2023-05-26 | Stop reason: HOSPADM

## 2023-05-26 RX ORDER — ACETAMINOPHEN 500 MG
1000 TABLET ORAL EVERY 6 HOURS
Status: DISCONTINUED | OUTPATIENT
Start: 2023-05-26 | End: 2023-05-28 | Stop reason: HOSPADM

## 2023-05-26 RX ORDER — ROPIVACAINE HYDROCHLORIDE 5 MG/ML
INJECTION, SOLUTION EPIDURAL; INFILTRATION; PERINEURAL
Status: COMPLETED | OUTPATIENT
Start: 2023-05-26 | End: 2023-05-26

## 2023-05-26 RX ORDER — ONDANSETRON 4 MG/1
4 TABLET, FILM COATED ORAL EVERY 6 HOURS PRN
Status: DISCONTINUED | OUTPATIENT
Start: 2023-05-26 | End: 2023-05-28 | Stop reason: HOSPADM

## 2023-05-26 RX ORDER — AMOXICILLIN 250 MG
2 CAPSULE ORAL 2 TIMES DAILY PRN
Status: DISCONTINUED | OUTPATIENT
Start: 2023-05-26 | End: 2023-05-28 | Stop reason: HOSPADM

## 2023-05-26 RX ORDER — MONTELUKAST SODIUM 10 MG/1
10 TABLET ORAL NIGHTLY
Status: DISCONTINUED | OUTPATIENT
Start: 2023-05-26 | End: 2023-05-28 | Stop reason: HOSPADM

## 2023-05-26 RX ORDER — TRANEXAMIC ACID 10 MG/ML
1000 INJECTION, SOLUTION INTRAVENOUS ONCE
Status: COMPLETED | OUTPATIENT
Start: 2023-05-26 | End: 2023-05-26

## 2023-05-26 RX ADMIN — FENTANYL CITRATE 50 MCG: 50 INJECTION, SOLUTION INTRAMUSCULAR; INTRAVENOUS at 08:15

## 2023-05-26 RX ADMIN — DEXMEDETOMIDINE HYDROCHLORIDE 20 MCG: 100 INJECTION, SOLUTION, CONCENTRATE INTRAVENOUS at 09:01

## 2023-05-26 RX ADMIN — SCOPOLAMINE 1 PATCH: 1.5 PATCH, EXTENDED RELEASE TRANSDERMAL at 07:11

## 2023-05-26 RX ADMIN — ROPIVACAINE HYDROCHLORIDE 30 ML: 5 INJECTION, SOLUTION EPIDURAL; INFILTRATION; PERINEURAL at 07:42

## 2023-05-26 RX ADMIN — EPHEDRINE SULFATE 5 MG: 50 INJECTION INTRAVENOUS at 10:44

## 2023-05-26 RX ADMIN — FENTANYL CITRATE 50 MCG: 50 INJECTION, SOLUTION INTRAMUSCULAR; INTRAVENOUS at 08:46

## 2023-05-26 RX ADMIN — MONTELUKAST 10 MG: 10 TABLET, FILM COATED ORAL at 20:30

## 2023-05-26 RX ADMIN — HYDROCODONE BITARTRATE AND ACETAMINOPHEN 2 TABLET: 7.5; 325 TABLET ORAL at 20:30

## 2023-05-26 RX ADMIN — SODIUM CHLORIDE, POTASSIUM CHLORIDE, SODIUM LACTATE AND CALCIUM CHLORIDE 9 ML/HR: 600; 310; 30; 20 INJECTION, SOLUTION INTRAVENOUS at 07:11

## 2023-05-26 RX ADMIN — MIDAZOLAM HYDROCHLORIDE 2 MG: 1 INJECTION, SOLUTION INTRAMUSCULAR; INTRAVENOUS at 07:12

## 2023-05-26 RX ADMIN — ROCURONIUM BROMIDE 10 MG: 10 INJECTION, SOLUTION INTRAVENOUS at 09:35

## 2023-05-26 RX ADMIN — DEXMEDETOMIDINE HYDROCHLORIDE 20 MCG: 100 INJECTION, SOLUTION, CONCENTRATE INTRAVENOUS at 09:57

## 2023-05-26 RX ADMIN — SODIUM CHLORIDE, POTASSIUM CHLORIDE, SODIUM LACTATE AND CALCIUM CHLORIDE 80 ML/HR: 600; 310; 30; 20 INJECTION, SOLUTION INTRAVENOUS at 14:47

## 2023-05-26 RX ADMIN — HYDROCODONE BITARTRATE AND ACETAMINOPHEN 1 TABLET: 7.5; 325 TABLET ORAL at 15:55

## 2023-05-26 RX ADMIN — CEFAZOLIN SODIUM 2 G: 2 INJECTION, SOLUTION INTRAVENOUS at 17:09

## 2023-05-26 RX ADMIN — ROCURONIUM BROMIDE 20 MG: 10 INJECTION, SOLUTION INTRAVENOUS at 09:51

## 2023-05-26 RX ADMIN — FENTANYL CITRATE 50 MCG: 50 INJECTION, SOLUTION INTRAMUSCULAR; INTRAVENOUS at 10:07

## 2023-05-26 RX ADMIN — KETOROLAC TROMETHAMINE 15 MG: 30 INJECTION, SOLUTION INTRAMUSCULAR; INTRAVENOUS at 10:45

## 2023-05-26 RX ADMIN — ONDANSETRON 4 MG: 2 INJECTION INTRAMUSCULAR; INTRAVENOUS at 08:20

## 2023-05-26 RX ADMIN — KETOROLAC TROMETHAMINE 15 MG: 15 INJECTION, SOLUTION INTRAMUSCULAR; INTRAVENOUS at 17:10

## 2023-05-26 RX ADMIN — Medication 150 MCG: at 09:28

## 2023-05-26 RX ADMIN — CELECOXIB 200 MG: 100 CAPSULE ORAL at 07:10

## 2023-05-26 RX ADMIN — SUGAMMADEX 200 MG: 100 INJECTION, SOLUTION INTRAVENOUS at 10:47

## 2023-05-26 RX ADMIN — SODIUM CHLORIDE, POTASSIUM CHLORIDE, SODIUM LACTATE AND CALCIUM CHLORIDE: 600; 310; 30; 20 INJECTION, SOLUTION INTRAVENOUS at 09:25

## 2023-05-26 RX ADMIN — GABAPENTIN 300 MG: 300 CAPSULE ORAL at 07:11

## 2023-05-26 RX ADMIN — CEFAZOLIN SODIUM 2 G: 2 INJECTION, SOLUTION INTRAVENOUS at 08:19

## 2023-05-26 RX ADMIN — Medication 100 MCG: at 09:36

## 2023-05-26 RX ADMIN — CEFAZOLIN SODIUM 1 G: 1 INJECTION, SOLUTION INTRAVENOUS at 09:39

## 2023-05-26 RX ADMIN — HYDROMORPHONE HYDROCHLORIDE 0.5 MG: 1 INJECTION, SOLUTION INTRAMUSCULAR; INTRAVENOUS; SUBCUTANEOUS at 12:03

## 2023-05-26 RX ADMIN — ONDANSETRON 4 MG: 2 INJECTION INTRAMUSCULAR; INTRAVENOUS at 13:23

## 2023-05-26 RX ADMIN — KETOROLAC TROMETHAMINE 15 MG: 15 INJECTION, SOLUTION INTRAMUSCULAR; INTRAVENOUS at 22:16

## 2023-05-26 RX ADMIN — DEXAMETHASONE SODIUM PHOSPHATE 8 MG: 4 INJECTION, SOLUTION INTRA-ARTICULAR; INTRALESIONAL; INTRAMUSCULAR; INTRAVENOUS; SOFT TISSUE at 08:20

## 2023-05-26 RX ADMIN — Medication 10 ML: at 14:48

## 2023-05-26 RX ADMIN — ROCURONIUM BROMIDE 10 MG: 10 INJECTION, SOLUTION INTRAVENOUS at 09:14

## 2023-05-26 RX ADMIN — Medication 10 ML: at 20:33

## 2023-05-26 RX ADMIN — LIDOCAINE HYDROCHLORIDE 50 MG: 20 INJECTION, SOLUTION EPIDURAL; INFILTRATION; INTRACAUDAL; PERINEURAL at 08:15

## 2023-05-26 RX ADMIN — HYDROMORPHONE HYDROCHLORIDE 0.5 MG: 1 INJECTION, SOLUTION INTRAMUSCULAR; INTRAVENOUS; SUBCUTANEOUS at 11:23

## 2023-05-26 RX ADMIN — TRANEXAMIC ACID 1000 MG: 10 INJECTION, SOLUTION INTRAVENOUS at 07:14

## 2023-05-26 RX ADMIN — PROPOFOL 150 MG: 10 INJECTION, EMULSION INTRAVENOUS at 08:15

## 2023-05-26 RX ADMIN — ROCURONIUM BROMIDE 10 MG: 10 INJECTION, SOLUTION INTRAVENOUS at 08:59

## 2023-05-26 RX ADMIN — MEPERIDINE HYDROCHLORIDE 12.5 MG: 25 INJECTION INTRAMUSCULAR; INTRAVENOUS; SUBCUTANEOUS at 11:24

## 2023-05-26 RX ADMIN — ACETAMINOPHEN 1000 MG: 500 TABLET ORAL at 07:10

## 2023-05-26 RX ADMIN — ROCURONIUM BROMIDE 50 MG: 10 INJECTION, SOLUTION INTRAVENOUS at 08:15

## 2023-05-26 RX ADMIN — Medication 100 MCG: at 10:35

## 2023-05-26 NOTE — H&P
"h and p      Chief Complaint  Follow-up of the bilateral Knee        Subjective          Amber Poole presents to [facility] for follow up of the right knee.  She is here for MRI results.  She had a right knee injection on 4/12/23 that gave little relief. To review  She has been cleaning the house and then she has been walking 2 miles a day.  She even tried to do some abdominal exercises with jumping and twisting and she did not know if that potentially aggravated the right knee.  Her pain has been since March 25 th.  She is having a left total knee arthroplasty on 5/26/23.            Allergies   Allergen Reactions   • Codeine Anaphylaxis and Rash   • Penicillins Rash         Social History   Social History           Socioeconomic History   • Marital status:    Tobacco Use   • Smoking status: Never   • Smokeless tobacco: Never   Vaping Use   • Vaping Use: Never used   Substance and Sexual Activity   • Alcohol use: Never   • Drug use: Never   • Sexual activity: Defer            Review of Systems            Objective      Vital Signs:   /88 (BP Location: Left arm)   Pulse 79   Ht 170.2 cm (67\")   Wt 82.1 kg (181 lb)   SpO2 95%   BMI 28.35 kg/m²        Physical Exam  Constitutional:       Appearance: Normal appearance. Patient is well-developed and normal weight.   HENT:      Head: Normocephalic.      Right Ear: Hearing and external ear normal.      Left Ear: Hearing and external ear normal.      Nose: Nose normal.   Eyes:      Conjunctiva/sclera: Conjunctivae normal.   Cardiovascular:      Rate and Rhythm: Normal rate.   Pulmonary:      Effort: Pulmonary effort is normal.      Breath sounds: No wheezing or rales.   Abdominal:      Palpations: Abdomen is soft.      Tenderness: There is no abdominal tenderness.   Musculoskeletal:      Cervical back: Normal range of motion.   Skin:     Findings: No rash.   Neurological:      Mental Status: Patient  is alert and " oriented to person, place, and time.   Psychiatric:         Mood and Affect: Mood and affect normal.         Judgment: Judgment normal.         Ortho Exam       RIGHT KNEE Flexion 125. Extension 0. Stable to varus/valgus stress. Stable to anterior/posterior drawer. Neurovascularly intact. Negative Kane. Negative Lachman. Positive EHL, FHL, HS and TA. Sensation intact to light touch all 5 nerves of the foot. Ambulates with Antalgic gait. Patella is well tracking. Calf supple, non-tender. Positive tenderness to the medial joint line. Positive tenderness to the lateral joint line. Positive Crepitus. Good strength to hamstrings, quadriceps, dorsiflexors, and plantar flexors.  Knee Extensor Mechanism intact           Procedures               Imaging Results (Most Recent)      None                   Result Review    :         XR Knee 3 View Right     Result Date: 4/24/2023  Narrative: X-Ray Report: Study: X-rays ordered, taken in the office, and reviewed today. Site: Right knee Xray Indication: Pain View: AP/Lateral, Standing and Sunrise view(s) Findings: No acute osseous abnormalities identified.  There is evidence of prominent bone spurring present and mild joint space narrowing.  There is medial patellar tilt appreciated. Prior studies available for comparison: yes      MRI Knee Right Without Contrast     Result Date: 5/12/2023  Narrative: PROCEDURE:       MRI KNEE RIGHT  WO CONTRAST  COMPARISON:         None  INDICATIONS:          RIGHT KNEE PAIN       TECHNIQUE:           A complete multi-planar MRI was performed.   FINDINGS:          The cruciate ligaments, collateral ligaments, and extensor mechanism of the knee are intact. There is no evidence for medial or lateral meniscal tear.  There is a moderate to large sized joint effusion.  There is no Baker's cyst.  There is a complex tear of the anterior horn and body of the lateral meniscus with extrusion seen extending cranially (series 501, image 16).  There is  degeneration of the medial meniscus with no evidence of a focal tear.  Fluid is seen tracking along the patellar tendon.  No evidence of a focal tear.  Full-thickness cartilage loss is seen along the lateral facet of the patellar cartilage extending to the apex with underlying osseous edema and subchondral cystic changes.  Full-thickness cartilage loss is present within the lateral trochlea.  Partial thickness fissures are present within the medial compartment.  Full-thickness cartilage loss is seen overlying the lateral femoral condyle measuring up to 8 mm in transverse dimension.  An intra-articular body seen within the posterior joint recess measuring up to 1.2 cm (series 1, image 19).  No significant reactive edema within this region.  Reactive edema is present within the lateral tibial plateau with diffuse cartilage loss present within this region.  The cortical margins are intact. No significant abnormal focal fluid collection is observed within the surrounding soft tissues.       Impression:      1. Complex tear of the anterior horn and body of the lateral meniscus with significant extrusion.  2. Moderate tricompartmental osteoarthritis, most pronounced along the lateral facet and apex of the patellar cartilage and overlying the lateral femoral condyle.  An intra-articular body is present within the posterior joint recess. 3. Patellar tendinitis. 4. Moderate to large sized joint effusion.     TERESA MEHTA MD       Electronically Signed and Approved By: TERESA MEHTA MD on 5/12/2023 at 15:16                          Assessment      Assessment and Plan      Diagnoses and all orders for this visit:     1. Acute lateral meniscus tear of right knee, sequela (Primary)     2. Osteoarthritis of right knee, unspecified osteoarthritis type           Discussed the treatment plan with the patient. I reviewed the MRI results with the patient. Discussed the treatment options with the patient, operative vs non-operative.  Discussed the treatment options with the patient, operative vs non-operative. The patient expressed understanding and wished to proceed with abilateral total knee arthroplasty .     .     Discussed surgery., Risks/benefits discussed with patient including, but not limited to: infection, bleeding, neurovascular damage, re-rupture, aesthetic deformity, need for further surgery, and death., Discussed with patient the implant type being used during surgery and patient understands., Surgery pamphlet given., Call or return if worsening symptoms. and DME order for a 3 in 1 given today due to patient will be confined to one room/level of the home that does not offer a toilet during postop recovery.      Follow Up      2 weeks postoperatively         Carl Swartz MD  05/26/23

## 2023-05-26 NOTE — ANESTHESIA PREPROCEDURE EVALUATION
Anesthesia Evaluation     Patient summary reviewed and Nursing notes reviewed   history of anesthetic complications: PONV  NPO Solid Status: > 8 hours  NPO Liquid Status: > 2 hours           Airway   Mallampati: II  TM distance: >3 FB  Neck ROM: full  No difficulty expected  Dental      Pulmonary - negative pulmonary ROS and normal exam    breath sounds clear to auscultation  Cardiovascular - negative cardio ROS and normal exam  Exercise tolerance: good (4-7 METS)    Rhythm: regular  Rate: normal        Neuro/Psych  (+) numbness,    GI/Hepatic/Renal/Endo    (+)   thyroid problem hypothyroidism    Musculoskeletal     Abdominal    Substance History - negative use     OB/GYN negative ob/gyn ROS         Other   arthritis,      ROS/Med Hx Other: >4METS. HX MTHFR/ASYMPTOMATIC. HX HASHIMOTOS. TJR. KT     THFR disorder                Anesthesia Plan    ASA 2     general with block     (Patient understands anesthesia not responsible for dental damage.)  intravenous induction     Anesthetic plan, risks, benefits, and alternatives have been provided, discussed and informed consent has been obtained with: patient.    Use of blood products discussed with patient .   Plan discussed with CRNA.        CODE STATUS:

## 2023-05-26 NOTE — H&P
[facility]  Date: 2023   Patient Name: Amber Poole  : 1965  MRN: [id number]  Primary Care Physician:  Faith Ramos APRN  Date of admission: 2023    Subjective   Subjective     Chief Complaint: Bilateral knee pain    HPI:    Amber Poole is a 57 y.o. female past medical history significant for hypothyroidism, MTHFR clotting disorder, bilateral osteoarthritis of the knees presents following bilateral knee arthroplasty.  Patient having significant postop nausea and vomiting per staff.  Per family at the bedside this is fairly routine when the patient undergoes anesthesia.  Emesis green and voluminous per staff.  Patient indicates pain currently well controlled biggest complaint is nausea.  Patient denies abdominal pain.  Denies changes in vision or dizziness.  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  No other issues per nursing.  Seen by physical therapy occupational therapy recommending home with outpatient therapy services.      Personal History     Past Medical History:  Past Medical History:   Diagnosis Date   • Allergic    • Allergies    • Arthritis    • Bilateral primary osteoarthritis of knee 2023   • Blood clotting disorder     MTHFR-  OVER CLOTTING- TAKES ASA DAILY- SEE'S PCP   • Chronic allergic rhinitis    • Hashimoto's thyroiditis    • Hemorrhoid     - RESOLVED   • Hypercoagulable state    • Hypothyroidism    • Knee pain     BILATERAL   • PONV (postoperative nausea and vomiting)    • Seasonal allergies    • Thyroid disorder    • Torn meniscus 2020    LEFT KNEE        Past Surgical History:  Past Surgical History:   Procedure Laterality Date   • COLONOSCOPY     • HEMORRHOIDECTOMY     • KNEE CARTILAGE SURGERY  2020    BEEN    • LASER ABLATION      UTERINE, NETHERRS   • TONSILLECTOMY     •  TUBAL ABDOMINAL LIGATION  1995        Family History:   Family History   Problem Relation Age of Onset   • Diabetes Mother    • Kidney nephrosis Mother    • Heart disease Father    • Vision loss Father    • Macular degeneration Father    • Kidney nephrosis Maternal Grandmother    • Other Maternal Grandmother         BLADDER CALCULUS   • Liver cancer Other         UNSPECIFIED   • Lung cancer Other         UNSPECIFIED   • Cancer Daughter    • Leukemia Daughter    • Leukemia Son    • Macular degeneration Sister         Social History:   Social History     Socioeconomic History   • Marital status:    Tobacco Use   • Smoking status: Never   • Smokeless tobacco: Never   Vaping Use   • Vaping Use: Never used   Substance and Sexual Activity   • Alcohol use: Never   • Drug use: Never   • Sexual activity: Defer        Home Medications:  Diclofenac Sodium, Vitamin D3, Zinc, ascorbic acid, aspirin, atorvastatin, celecoxib, docusate calcium, docusate sodium, escitalopram, levothyroxine, liothyronine, loratadine, montelukast, norethindrone-ethinyl estradiol, and traZODone    Allergies:  Allergies   Allergen Reactions   • Codeine Anaphylaxis and Rash   • Penicillins Rash       Review of Systems   Constitutional: Negative for fatigue and fever.   HENT: Negative for sore throat and trouble swallowing.    Eyes: Negative for pain and discharge.   Respiratory: Negative for cough and shortness of breath.    Cardiovascular: Negative for chest pain and palpitations.   Gastrointestinal: Negative for abdominal pain, nausea and vomiting.   Endocrine: Negative for cold intolerance and heat intolerance.   Genitourinary: Negative for difficulty urinating and dysuria.   Musculoskeletal: Negative for back pain and neck stiffness.   Skin: Negative for color change and rash.   Neurological: Negative for syncope and headaches.   Hematological: Negative for adenopathy.   Psychiatric/Behavioral: Negative for confusion and  hallucinations.    Objective   Objective     Vitals:   Temp:  [97.4 °F (36.3 °C)-99.7 °F (37.6 °C)] 97.8 °F (36.6 °C)  Heart Rate:  [79-99] 82  Resp:  [11-20] 16  BP: (110-145)/(62-91) 114/65  Flow (L/min):  [1-3] 1    Physical Exam   Gen. well-developed appearing stated age in no acute distress  HEENT: Normocephalic atraumatic moist membranes pupils equal round reactive light, no scleral icterus no conjunctival injection  Cardiovascular: regular rate and rhythm no murmurs rubs or gallops S1-S2, no lower extremity edema appreciated  Pulmonary: Clear to auscultation bilaterally no wheezes rales or rhonchi symmetric chest expansion, unlabored, no conversational dyspnea appreciated  Gastrointestinal: Soft nontender nondistended positive bowel sounds all 4 quadrants no rebound or guarding  Musculoskeletal: No clubbing cyanosis, warm and well-perfused, calves soft symmetric nontender bilaterally  Skin: Clean dry without rashes  Neuro: Cranial nerves II through XII intact grossly no sensorimotor deficits appreciated bilateral upper and lower extremities  Psych: Patient is calm cooperative and appropriate with exam not responding to internal stimuli  : No Kincaid catheter no bladder distention no suprapubic tenderness    Result Review    Result Review:  I have personally reviewed the results from the time of this admission to 5/26/2023 17:08 EDT and agree with these findings:  [x]  Laboratory  LAB RESULTS:                              Brief Urine Lab Results     None        Microbiology Results (last 10 days)     ** No results found for the last 240 hours. **          []  Microbiology  [x]  Radiology  XR Knee 1 or 2 View Left    Result Date: 5/26/2023   Interval left knee arthroplasty without complicating features.      BERNARDINO PACE MD       Electronically Signed and Approved By: BERNARDINO PACE MD on 5/26/2023 at 11:53             XR Knee 1 or 2 View Right    Result Date: 5/26/2023   Interval right knee arthroplasty  without immediate complication.      BERNARDINO PACE MD       Electronically Signed and Approved By: BERNARDINO PACE MD on 5/26/2023 at 11:55               []  EKG/Telemetry   []  Cardiology/Vascular   []  Pathology  []  Old records  [x]  Other:  Scheduled Meds:acetaminophen, 1,000 mg, Oral, Q6H  [START ON 5/27/2023] apixaban, 2.5 mg, Oral, Q12H  [START ON 5/27/2023] atorvastatin, 20 mg, Oral, Daily  ceFAZolin, 2 g, Intravenous, Q8H  [START ON 5/27/2023] escitalopram, 10 mg, Oral, Daily  ketorolac, 15 mg, Intravenous, Q6H  [START ON 5/27/2023] levothyroxine, 300 mcg, Oral, Q AM  [START ON 5/27/2023] liothyronine, 10 mcg, Oral, Daily  montelukast, 10 mg, Oral, Nightly  sodium chloride, 10 mL, Intravenous, Q12H      Continuous Infusions:lactated ringers, 80 mL/hr, Last Rate: 80 mL/hr (05/26/23 1447)      PRN Meds:.•  bisacodyl  •  bisacodyl  •  HYDROcodone-acetaminophen  •  HYDROcodone-acetaminophen  •  magnesium hydroxide  •  [DISCONTINUED] Morphine **AND** naloxone  •  ondansetron **OR** ondansetron  •  senna-docusate sodium  •  sodium chloride  •  sodium chloride  •  traZODone        Assessment & Plan   Assessment / Plan     Assessment/Plan:   Primary osteoarthritis bilateral knees now status post bilateral knee arthroplasty  Postop nausea and vomiting  Hypothyroidism  MTHFR clotting disorder      Patient admitted for postoperative pain management as well as physical therapy occupational therapy  Orthopedics consulted thank you for your recommendations  Initiate total joint protocol  Continue pain control with oral analgesics IV for breakthrough  Encourage early activity  Encourage incentive spirometry  Physical therapy occupational therapy consulted to evaluate and treat  Antibiotics and dressing changes per orthopedics  DVT prophylaxis with SCDs we will transition to Eliquis postop day 1 with plan of discharging on oral DOAC before transitioning back to home aspirin due to MTHFR  Continue antiemetics as  needed  Continue home Cytomel and levothyroxine  Continue home atorvastatin  Further inpatient orders recommendations pending clinical course.    DVT prophylaxis:  Medical and mechanical DVT prophylaxis orders are present.    CODE STATUS:    Code Status (Patient has no pulse and is not breathing): CPR (Attempt to Resuscitate)  Medical Interventions (Patient has pulse or is breathing): Full Support  Release to patient: Routine Release

## 2023-05-26 NOTE — ANESTHESIA PROCEDURE NOTES
Peripheral Block      Patient reassessed immediately prior to procedure    Patient location during procedure: pre-op  Start time: 5/26/2023 7:42 AM  Stop time: 5/26/2023 7:53 AM  Reason for block: at surgeon's request and post-op pain management  Performed by  Anesthesiologist: Braulio Neumann MD  Preanesthetic Checklist  Completed: patient identified, IV checked, site marked, risks and benefits discussed, surgical consent, monitors and equipment checked, pre-op evaluation and timeout performed  Prep:  Pt Position: supine  Sterile barriers:alcohol skin prep, partial drape, cap, washed/disinfected hands, mask and gloves  Prep: ChloraPrep  Patient monitoring: blood pressure monitoring, continuous pulse oximetry and EKG  Procedure    Sedation: yes  Performed under: local infiltration  Guidance:ultrasound guided and nerve stimulator    ULTRASOUND INTERPRETATION.  Using ultrasound guidance a 20 G gauge needle was placed in close proximity to the nerve, at which point, under ultrasound guidance anesthetic was injected in the area of the nerve and spread of the anesthesia was seen on ultrasound in close proximity thereto.  There were no abnormalities seen on ultrasound; a digital image was taken; and the patient tolerated the procedure with no complications. Images:still images obtained, printed/placed on chart    Laterality:Bilateral  Block Type:adductor canal block  Injection Technique:single-shot  Needle Type:echogenic  Needle Gauge:20 G (4in)  Resistance on Injection: none    Medications Used: ropivacaine (NAROPIN) 0.5 % injection - Injection   30 mL - 5/26/2023 7:42:00 AM      Medications  Comment:30 ml Ropivicaine 0.5% 1:200K epinephrine + 10 ml saline = 40 ml divided to make 20 ml each side.    Post Assessment  Injection Assessment: negative aspiration for heme, no paresthesia on injection and incremental injection  Patient Tolerance:comfortable throughout block  Complications:no  Additional Notes  The block or  continuous infusion is requested by the referring physician for management of postoperative pain, or pain related to a procedure. Ultrasound guidance (deemed medically necessary). Painless injection, pt was awake and conversant during the procedure without complications. Needle and surrounding structures visualized throughout procedure. No adverse reactions or complications seen during this period. Post-procedure image showed no signs of complication, and anatomy was consistent with an uncomplicated nerve blockade.

## 2023-05-26 NOTE — OP NOTE
TOTAL KNEE ARTHROPLASTY WITH BRYANT ROBOT  Procedure Report    Patient Name:  Amber Poole  YOB: 1965    Date of Surgery:  5/26/2023       Pre-op Diagnosis:   Primary osteoarthritis of BILATERAL KNEE       Post-Op Diagnosis Codes:     * Primary osteoarthritis of left knee [M17.12]     * Bilateral primary osteoarthritis of knee [M17.0]    Procedure/CPT® Codes:      Procedure(s):  BILATERAL  TOTAL KNEE ARTHROPLASTY WITH BRYANT ROBOT    Surgical Approach: Knee Medial Parapatellar        Staff:  Surgeon(s):  Carl Swartz MD    Assistant: Deysi Gustafson RN; Evin Trejo RN    Anesthesia: General with Block    Estimated Blood Loss: 50ml    Implants:    Implant Name Type Inv. Item Serial No.  Lot No. LRB No. Used Action   CMT BONE PALACOS R HI/VISC 1X40 - [id number] Implant CMT BONE PALACOS R HI/VISC 1X40  [employer] [id number] Left 1 Implanted   CMT BONE PALACOS R HI/VISC 1X40 - [id number] Implant CMT BONE PALACOS R HI/VISC 1X40  [employer] [id number] Right 2 Implanted   COMP FEM/KN PERSONA CR CMT NRW SZ6 LT - [id number] Implant COMP FEM/KN PERSONA CR CMT NRW SZ6 LT  [employer] [id number] Left 1 Implanted   STEM TIB/KN PERSONA CMT 5D SZD LT - [id number] Implant STEM TIB/KN PERSONA CMT 5D SZD LT  [employer] [id number] Left 1 Implanted   PAT KN PERSONA ALLPOLY CMT 8X29MM - [id number] Implant PAT KN PERSONA ALLPOLY CMT 8X29MM  [employer] [id number] Left 1 Implanted   ART/SRF KN PERSONA/VE PS CD/CR 6TO7 10MM LT - [id number] Implant ART/SRF KN PERSONA/VE PS CD/CR 6TO7 10MM LT  [employer] [id number] Left 1 Implanted   ART/SRF KN PERSONA/VE MC EF 6TO7 10MM RT - [id number] Implant ART/SRF KN PERSONA/VE MC EF 6TO7 10MM RT  [employer] [id number] Right 1 Implanted   COMP FEM/KN PERSONA CR CMT NRW SZ6 RT - [id number] Implant COMP FEM/KN PERSONA CR CMT NRW SZ6 RT  [employer] [id number] Right 1 Implanted   STEM TIB/KN PERSONA CMT 5D SZD RT - [id number] Implant STEM TIB/KN PERSONA  CMT 5D SZD RT  [employer] [id number] Right 1 Implanted   PAT KN PERSONA ALLPOLKHURRAM CMT 8X29MM - [id number] Implant MARIELENA BOND CMT 8X29MM  [employer] [id number] Right 1 Implanted       Specimen:          None    Findings: See description    Complications: None    Description of Procedure: Patient was taken to the operating room placed supine operating table after abductor canal blocks and in preoperative holding.  After general tracheal anesthesia was established the left knee and lower extremity were prepped and draped in standard surgical fashion using alcohol ChloraPrep.  The Cassidy robot was also draped sterilely and calibrated.  Incision was made 2 fingerbreadth superior superior pole of the patella down to the medial aspect of tibial tubercle the knife and full-thickness skin flaps were raised laterally and medially.  A medial parapatellar arthrotomy was then undertaken and the knee was brought into flexion.  Retractors were placed to protect protect the collateral ligaments.  Soft tissue releases and osteophytes removed as deemed necessary.  Then the tracking device was mounted on the distal femur in a standard fashion as well as through separate stab incisions in the distal tibia 5 fingerbreadths inferior to the tibial tubercle.  Then all the femoral points were mapped out using the Cassidy software the tibial points were mapped out as well.  While the plan for resection was being completed the patella was everted calipered and cut to the appropriate thickness.  The correct size patella was chosen in the locals for the patella were reamed and a trial patella was placed and the knee was brought back into flexion.  The distal femoral cutting block was then brought in using robotic assisted arm and the distal femoral cut was made it was checked and verified and accepted.  Then the external rotation arm of the robot was used to pin the distal femur and the correct external rotation decided by the  intraoperative plan using the previously mapped points.  Then the tibia was cut after removing the robotic arm and to the appropriate position to cut the tibia the cutting block was pinned and the proximal tibia cut was made excess bone from the cut was removed and the 4-in-1 cutting block was then used in the distal femur.  The tibial cut was verified and accepted femoral and tibial trials were then placed and the knee was taken through range of motion verifying flexion extension gaps and extension and flexion range of motion to be acceptable by using the software in a standard fashion.  Locals for the femur were then drilled the trials were removed the bone ends were copiously irrigated with bacitracin Simpulse irrigation.  The tibia was cemented in place according to marked external rotation from the trials the femur was cemented in position second and then the real polychosen was placed.  Excess cement removed from the implant edges the knee was held in extension until the cement dried and the patella was cemented into place and held with a patellar clamp.  After the cement hardened excess management from the implant edges Irrisept was used and wound was copiously irrigated the knee was taken through range of motion and checked 1 last time the patella tracked in the center of the trochlear groove the femur using no thumbs technique.  Then the arthrotomy was closed in 45 degrees of flexion with Ethibond the deep fat was closed 0 Vicryl subcu was closed with 2-0 Vicryl and the skin was closed with staples incision was washed and dried and sterile dressings were applied.  the right knee and lower extremity were prepped and draped in standard surgical fashion using alcohol ChloraPrep.  The Cassidy robot was also draped sterilely and calibrated.  Incision was made 2 fingerbreadth superior superior pole of the patella down to the medial aspect of tibial tubercle the knife and full-thickness skin flaps were raised  laterally and medially.  A medial parapatellar arthrotomy was then undertaken and the knee was brought into flexion.  Retractors were placed to protect protect the collateral ligaments.  Soft tissue releases and osteophytes removed as deemed necessary.  Then the tracking device was mounted on the distal femur in a standard fashion as well as through separate stab incisions in the distal tibia 5 fingerbreadths inferior to the tibial tubercle.  Then all the femoral points were mapped out using the Cassidy software the tibial points were mapped out as well.  While the plan for resection was being completed the patella was everted calipered and cut to the appropriate thickness.  The correct size patella was chosen in the locals for the patella were reamed and a trial patella was placed and the knee was brought back into flexion.  The distal femoral cutting block was then brought in using robotic assisted arm and the distal femoral cut was made it was checked and verified and accepted.  Then the external rotation arm of the robot was used to pin the distal femur and the correct external rotation decided by the intraoperative plan using the previously mapped points.  Then the tibia was cut after removing the robotic arm and to the appropriate position to cut the tibia the cutting block was pinned and the proximal tibia cut was made excess bone from the cut was removed and the 4-in-1 cutting block was then used in the distal femur.  The tibial cut was verified and accepted femoral and tibial trials were then placed and the knee was taken through range of motion verifying flexion extension gaps and extension and flexion range of motion to be acceptable by using the software in a standard fashion.  Locals for the femur were then drilled the trials were removed the bone ends were copiously irrigated with bacitracin Simpulse irrigation.  The tibia was cemented in place according to marked external rotation from the trials the  femur was cemented in position second and then the real polychosen was placed.  Excess cement removed from the implant edges the knee was held in extension until the cement dried and the patella was cemented into place and held with a patellar clamp.  After the cement hardened excess management from the implant edges Irrisept was used and wound was copiously irrigated the knee was taken through range of motion and checked 1 last time the patella tracked in the center of the trochlear groove the femur using no thumbs technique.  Then the arthrotomy was closed in 45 degrees of flexion with Ethibond the deep fat was closed 0 Vicryl subcu was closed with 2-0 Vicryl and the skin was closed with staples incision was washed and dried and sterile dressings were applied  The patient tolerated the procedure well and was taken to recovery room.       Carl Swartz MD     Date: 5/26/2023  Time: 11:13 EDT

## 2023-05-26 NOTE — ANESTHESIA POSTPROCEDURE EVALUATION
Patient: Amber Poole    Procedure Summary     Date: 05/26/23 Room / Location: [facility] OR 03 / [facility] MAIN OR    Anesthesia Start: 0809 Anesthesia Stop: 1105    Procedure: BILATERAL  TOTAL KNEE ARTHROPLASTY WITH BRYANT ROBOT (Bilateral: Knee) Diagnosis:       Primary osteoarthritis of left knee      Bilateral primary osteoarthritis of knee      (Primary osteoarthritis of BILATERAL KNEE)    Surgeons: Carl Swartz MD Provider: Braulio Neumann MD    Anesthesia Type: general with block ASA Status: 2          Anesthesia Type: general with block    Vitals  Vitals Value Taken Time   /76 05/26/23 1217   Temp 36.6 °C (97.9 °F) 05/26/23 1100   Pulse 94 05/26/23 1220   Resp 12 05/26/23 1135   SpO2 94 % 05/26/23 1220   Vitals shown include unvalidated device data.        Post Anesthesia Care and Evaluation    Patient location during evaluation: bedside  Patient participation: complete - patient participated  Level of consciousness: awake  Pain management: adequate    Airway patency: patent  PONV Status: none  Cardiovascular status: acceptable  Respiratory status: acceptable  Hydration status: acceptable    Comments: An Anesthesiologist personally participated in the most demanding procedures (including induction and emergence if applicable) in the anesthesia plan, monitored the course of anesthesia administration at frequent intervals and remained physically present and available for immediate diagnosis and treatment of emergencies.

## 2023-05-26 NOTE — THERAPY EVALUATION
Acute Care - Physical Therapy Initial Evaluation   [facility]     Patient Name: Amber Poole  : 1965  MRN: [id number]  Today's Date: 2023      Visit Dx:     ICD-10-CM ICD-9-CM   1. Difficulty walking  R26.2 719.7   2. Primary osteoarthritis of left knee  M17.12 715.16     Patient Active Problem List   Diagnosis   • Hypothyroidism   • Seasonal allergic rhinitis   • Hypercoagulable state   • Arthritis   • Coagulation disorder   • Anemia   • Bilateral carpal tunnel syndrome   • Primary osteoarthritis of left knee   • Osteoarthrosis, localized, primary, knee, left   • Bilateral primary osteoarthritis of knee   • Primary osteoarthritis of both knees     Past Medical History:   Diagnosis Date   • Allergic    • Allergies    • Arthritis    • Bilateral primary osteoarthritis of knee 2023   • Blood clotting disorder     MTHFR-  OVER CLOTTING- TAKES ASA DAILY- SEE'S PCP   • Chronic allergic rhinitis    • Hashimoto's thyroiditis    • Hemorrhoid     - RESOLVED   • Hypercoagulable state    • Hypothyroidism    • Knee pain     BILATERAL   • PONV (postoperative nausea and vomiting)    • Seasonal allergies    • Thyroid disorder    • Torn meniscus 2020    LEFT KNEE     Past Surgical History:   Procedure Laterality Date   • COLONOSCOPY     • HEMORRHOIDECTOMY     • KNEE CARTILAGE SURGERY  2020    BEEN    • LASER ABLATION      UTERINE, N[place]   • TONSILLECTOMY     • TUBAL ABDOMINAL LIGATION       PT Assessment (last 12 hours)     PT Evaluation and Treatment     Row Name 23 1400          Physical Therapy Time and Intention    Document Type evaluation  -AV     Mode of Treatment individual therapy;physical therapy  -AV     Row Name 23 1400          General Information    Patient Profile Reviewed yes  -AV     Prior Level of Function independent:;all household mobility;gait;transfer;ADL's  Ambulated without an assistive device. No home O2.  -AV     Existing  Precautions/Restrictions fall;weight bearing  BLE WBAT  -AV     Row Name 05/26/23 1400          Living Environment    Current Living Arrangements home  -AV     Home Accessibility stairs to enter home  -AV     People in Home spouse  -AV     Row Name 05/26/23 1400          Home Main Entrance    Number of Stairs, Main Entrance one  -AV     Row Name 05/26/23 1400          Range of Motion (ROM)    Range of Motion right lower extremity ROM;left lower extremity ROM  -AV     Left Lower Extremity (ROM) left LE ROM is WFL except;knee  -AV     Knee, Left (Range of Motion) 10-90°  -AV     Right Lower Extremity (ROM) right LE ROM is WFL except;knee  -AV     Knee, Right (Range of Motion) 5-100°  -AV     Row Name 05/26/23 1400          Strength (Manual Muscle Testing)    Strength (Manual Muscle Testing) --  knee extension 3+/5  -     Row Name 05/26/23 1400          Mobility    Extremity Weight-bearing Status right lower extremity;left lower extremity  -AV     Left Lower Extremity (Weight-bearing Status) weight-bearing as tolerated (WBAT)  -AV     Right Lower Extremity (Weight-bearing Status) weight-bearing as tolerated (WBAT)  -AV     Row Name 05/26/23 1400          Bed Mobility    Bed Mobility supine-sit  -AV     Supine-Sit Adolphus (Bed Mobility) minimum assist (75% patient effort)  -AV     Bed Mobility, Safety Issues decreased use of legs for bridging/pushing;decreased use of arms for pushing/pulling  -AV     Assistive Device (Bed Mobility) head of bed elevated  -AV     Row Name 05/26/23 1400          Transfers    Transfers sit-stand transfer;stand-sit transfer;toilet transfer  -AV     Comment, (Transfers) Patient completed 5 sit-stand transfers throughout evaluation. Patient initially requiring minimal assist but progressed to CGA. Cue provided for forward trunk lean when completing transfer.  -AV     Row Name 05/26/23 1400          Sit-Stand Transfer    Sit-Stand Adolphus (Transfers) contact guard;minimum assist  (75% patient effort)  -AV     Assistive Device (Sit-Stand Transfers) walker, front-wheeled  -AV     Row Name 05/26/23 1400          Stand-Sit Transfer    Stand-Sit Starr (Transfers) contact guard;minimum assist (75% patient effort)  -AV     Assistive Device (Stand-Sit Transfers) walker, front-wheeled  -AV     Row Name 05/26/23 1400          Toilet Transfer    Type (Toilet Transfer) sit-stand;stand-sit  -AV     Starr Level (Toilet Transfer) contact guard;minimum assist (75% patient effort)  -AV     Assistive Device (Toilet Transfer) commode, 3-in-1  -AV     Row Name 05/26/23 1400          Gait/Stairs (Locomotion)    Gait/Stairs Locomotion gait/ambulation independence;gait/ambulation assistive device;distance ambulated  -AV     Starr Level (Gait) minimum assist (75% patient effort)  -AV     Assistive Device (Gait) walker, front-wheeled  -AV     Ambulated day of surgery or within 4 hours of PACU discharge yes  -AV     Distance in Feet (Gait) 5, 20  -AV     Deviations/Abnormal Patterns (Gait) antalgic;gait speed decreased;stride length decreased  -AV     Row Name 05/26/23 1400          Safety Issues, Functional Mobility    Impairments Affecting Function (Mobility) balance;endurance/activity tolerance;pain;strength;range of motion (ROM)  -AV     Row Name 05/26/23 1400          Balance    Balance Assessment standing dynamic balance  -AV     Dynamic Standing Balance minimal assist  -AV     Position/Device Used, Standing Balance supported;walker, front-wheeled  -AV     Row Name             Wound Left anterior knee Incision    Wound - Properties Group Side: Left  -LK Orientation: anterior  -LK Location: knee  -LK Primary Wound Type: Incision  -LK Additional Comments: LEFT TOTAL KNEE ARTHROPLASTY  -LK    Retired Wound - Properties Group Side: Left  -LK Orientation: anterior  -LK Location: knee  -LK Primary Wound Type: Incision  -LK Additional Comments: LEFT TOTAL KNEE ARTHROPLASTY  -LK    Retired Wound -  Properties Group Side: Left  -LK Location: knee  -LK Primary Wound Type: Incision  -LK Additional Comments: LEFT TOTAL KNEE ARTHROPLASTY  -LK    Row Name             Wound 05/26/23 0953 Right anterior knee Incision    Wound - Properties Group Placement Date: 05/26/23  -SC Placement Time: 0953  -SC Present on Hospital Admission: N  -SC Side: Right  -SC Orientation: anterior  -SC Location: knee  -SC Primary Wound Type: Incision  -SC    Retired Wound - Properties Group Placement Date: 05/26/23  -SC Placement Time: 0953 -SC Present on Hospital Admission: N  -SC Side: Right  -SC Orientation: anterior  -SC Location: knee  -SC Primary Wound Type: Incision  -SC    Retired Wound - Properties Group Date first assessed: 05/26/23  -SC Time first assessed: 0953 -SC Present on Hospital Admission: N  -SC Side: Right  -SC Location: knee  -SC Primary Wound Type: Incision  -SC    Row Name 05/26/23 1400          Plan of Care Review    Plan of Care Reviewed With patient;family  -AV     Progress no change  -AV     Outcome Evaluation Patient presents with deficits in balance, endurance, transfers, and ambulation. Patient will benefit from skilled PT services to address these mobility deficits and decrease risk of falls. Recommend outpatient PT following hospital discharge.  -AV     Row Name 05/26/23 1400          Therapy Assessment/Plan (PT)    Patient/Family Therapy Goals Statement (PT) Walk without pain, be independent, play with grandkids  -AV     Rehab Potential (PT) good, to achieve stated therapy goals  -AV     Criteria for Skilled Interventions Met (PT) yes;skilled treatment is necessary  -AV     Therapy Frequency (PT) 2 times/day  -AV     Predicted Duration of Therapy Intervention (PT) 10 days  -AV     Problem List (PT) problems related to;balance;mobility;strength;range of motion (ROM);pain  -AV     Activity Limitations Related to Problem List (PT) unable to transfer safely;unable to ambulate safely  -AV     Row Name 05/26/23  1400          PT Evaluation Complexity    History, PT Evaluation Complexity no personal factors and/or comorbidities  -AV     Examination of Body Systems (PT Eval Complexity) total of 4 or more elements  -AV     Clinical Presentation (PT Evaluation Complexity) stable  -AV     Clinical Decision Making (PT Evaluation Complexity) low complexity  -AV     Overall Complexity (PT Evaluation Complexity) low complexity  -AV     Row Name 05/26/23 1400          Therapy Plan Review/Discharge Plan (PT)    Therapy Plan Review (PT) evaluation/treatment results reviewed;patient  -AV     Row Name 05/26/23 1400          Physical Therapy Goals    Transfer Goal Selection (PT) transfer, PT goal 1  -AV     Gait Training Goal Selection (PT) gait training, PT goal 1  -AV     ROM Goal Selection (PT) ROM, PT goal 1  -AV     Row Name 05/26/23 1400          Transfer Goal 1 (PT)    Activity/Assistive Device (Transfer Goal 1, PT) transfers, all;walker, rolling  -AV     Blaine Level/Cues Needed (Transfer Goal 1, PT) modified independence  -AV     Time Frame (Transfer Goal 1, PT) 10 days  -AV     Row Name 05/26/23 1400          Gait Training Goal 1 (PT)    Activity/Assistive Device (Gait Training Goal 1, PT) gait (walking locomotion);assistive device use;walker, rolling  -AV     Blaine Level (Gait Training Goal 1, PT) modified independence  -AV     Distance (Gait Training Goal 1, PT) 200  -AV     Time Frame (Gait Training Goal 1, PT) 10 days  -AV     Row Name 05/26/23 1400          ROM Goal 1 (PT)    ROM Goal 1 (PT) Patient will demonstrate improvement in bilateral knee range of motion to 0-110°  -AV     Time Frame (ROM Goal 1, PT) 10 days  -AV           User Key  (r) = Recorded By, (t) = Taken By, (c) = Cosigned By    Initials Name Provider Type    Blanca Bledsoe, RN Registered Nurse    AV Placido Guerrero, PT Physical Therapist    Cassie Crowley, RN Registered Nurse                Physical Therapy Education     Title: PT  OT SLP Therapies (In Progress)     Topic: Physical Therapy (In Progress)     Point: Mobility training (Done)     Learning Progress Summary           Patient Acceptance, E,TB, VU by AV at 5/26/2023 1458                   Point: Home exercise program (Not Started)     Learner Progress:  Not documented in this visit.          Point: Body mechanics (Done)     Learning Progress Summary           Patient Acceptance, E,TB, VU by AV at 5/26/2023 1458                   Point: Precautions (Done)     Learning Progress Summary           Patient Acceptance, E,TB, VU by AV at 5/26/2023 1458                               User Key     Initials Effective Dates Name Provider Type Discipline    AV 06/11/21 -  Placido Guerrero, PT Physical Therapist PT              PT Recommendation and Plan  Anticipated Discharge Disposition (PT): home with outpatient therapy services  Planned Therapy Interventions (PT): balance training, bed mobility training, gait training, neuromuscular re-education, strengthening, transfer training, home exercise program  Therapy Frequency (PT): 2 times/day  Plan of Care Reviewed With: patient, family  Progress: no change  Outcome Evaluation: Patient presents with deficits in balance, endurance, transfers, and ambulation. Patient will benefit from skilled PT services to address these mobility deficits and decrease risk of falls. Recommend outpatient PT following hospital discharge.   Outcome Measures     Row Name 05/26/23 1400             How much help from another person do you currently need...    Turning from your back to your side while in flat bed without using bedrails? 3  -AV      Moving from lying on back to sitting on the side of a flat bed without bedrails? 3  -AV      Moving to and from a bed to a chair (including a wheelchair)? 3  -AV      Standing up from a chair using your arms (e.g., wheelchair, bedside chair)? 3  -AV      Climbing 3-5 steps with a railing? 2  -AV      To walk in hospital room? 3   -AV      AM-PAC 6 Clicks Score (PT) 17  -AV         Functional Assessment    Outcome Measure Options AM-PAC 6 Clicks Basic Mobility (PT)  -AV            User Key  (r) = Recorded By, (t) = Taken By, (c) = Cosigned By    Initials Name Provider Type    Placido Junior, PT Physical Therapist                 Time Calculation:    PT Charges     Row Name 05/26/23 1457             Time Calculation    PT Received On 05/26/23  -AV      PT Goal Re-Cert Due Date 06/04/23  -AV         Untimed Charges    PT Eval/Re-eval Minutes 50  -AV         Total Minutes    Untimed Charges Total Minutes 50  -AV       Total Minutes 50  -AV            User Key  (r) = Recorded By, (t) = Taken By, (c) = Cosigned By    Initials Name Provider Type    Placido Junior, LINETTE Physical Therapist              Therapy Charges for Today     Code Description Service Date Service Provider Modifiers Qty    [id number] HC PT EVAL LOW COMPLEXITY 4 5/26/2023 Placido Guerrero, PT GP 1          PT G-Codes  Outcome Measure Options: AM-PAC 6 Clicks Basic Mobility (PT)  AM-PAC 6 Clicks Score (PT): Jair Guerrero PT  5/26/2023

## 2023-05-26 NOTE — PLAN OF CARE
Goal Outcome Evaluation:  Plan of Care Reviewed With: patient, family        Progress: no change  Outcome Evaluation: Patient presents with deficits in balance, endurance, transfers, and ambulation. Patient will benefit from skilled PT services to address these mobility deficits and decrease risk of falls. Recommend outpatient PT following hospital discharge.

## 2023-05-27 LAB
ALBUMIN SERPL-MCNC: 3.3 G/DL (ref 3.5–5.2)
ANION GAP SERPL CALCULATED.3IONS-SCNC: 7.4 MMOL/L (ref 5–15)
BUN SERPL-MCNC: 11 MG/DL (ref 6–20)
BUN/CREAT SERPL: 15.7 (ref 7–25)
CALCIUM SPEC-SCNC: 8.6 MG/DL (ref 8.6–10.5)
CHLORIDE SERPL-SCNC: 105 MMOL/L (ref 98–107)
CO2 SERPL-SCNC: 24.6 MMOL/L (ref 22–29)
CREAT SERPL-MCNC: 0.7 MG/DL (ref 0.57–1)
EGFRCR SERPLBLD CKD-EPI 2021: 102.3 ML/MIN/1.73
GLUCOSE SERPL-MCNC: 114 MG/DL (ref 65–99)
PHOSPHATE SERPL-MCNC: 2.6 MG/DL (ref 2.5–4.5)
POTASSIUM SERPL-SCNC: 3.7 MMOL/L (ref 3.5–5.2)
QT INTERVAL: 399 MS
SODIUM SERPL-SCNC: 137 MMOL/L (ref 136–145)
WHOLE BLOOD HOLD SPECIMEN: NORMAL

## 2023-05-27 PROCEDURE — 25010000002 KETOROLAC TROMETHAMINE PER 15 MG: Performed by: ORTHOPAEDIC SURGERY

## 2023-05-27 PROCEDURE — 97165 OT EVAL LOW COMPLEX 30 MIN: CPT

## 2023-05-27 PROCEDURE — 94799 UNLISTED PULMONARY SVC/PX: CPT

## 2023-05-27 PROCEDURE — 97530 THERAPEUTIC ACTIVITIES: CPT

## 2023-05-27 PROCEDURE — 97535 SELF CARE MNGMENT TRAINING: CPT

## 2023-05-27 PROCEDURE — 97110 THERAPEUTIC EXERCISES: CPT

## 2023-05-27 PROCEDURE — 99214 OFFICE O/P EST MOD 30 MIN: CPT | Performed by: FAMILY MEDICINE

## 2023-05-27 PROCEDURE — 80069 RENAL FUNCTION PANEL: CPT | Performed by: FAMILY MEDICINE

## 2023-05-27 PROCEDURE — 25010000002 CEFAZOLIN IN DEXTROSE 2-4 GM/100ML-% SOLUTION: Performed by: ORTHOPAEDIC SURGERY

## 2023-05-27 PROCEDURE — 25010000002 HYDROMORPHONE 1 MG/ML SOLUTION: Performed by: FAMILY MEDICINE

## 2023-05-27 PROCEDURE — 97116 GAIT TRAINING THERAPY: CPT

## 2023-05-27 RX ORDER — ASPIRIN 325 MG
325 TABLET ORAL DAILY
Qty: 21 TABLET | Refills: 1 | Status: SHIPPED | OUTPATIENT
Start: 2023-05-27

## 2023-05-27 RX ORDER — HYDROCODONE BITARTRATE AND ACETAMINOPHEN 7.5; 325 MG/1; MG/1
1 TABLET ORAL EVERY 4 HOURS PRN
Qty: 45 TABLET | Refills: 0 | Status: SHIPPED | OUTPATIENT
Start: 2023-05-27 | End: 2023-05-31 | Stop reason: SDUPTHER

## 2023-05-27 RX ADMIN — Medication 10 ML: at 20:00

## 2023-05-27 RX ADMIN — MONTELUKAST 10 MG: 10 TABLET, FILM COATED ORAL at 20:00

## 2023-05-27 RX ADMIN — HYDROCODONE BITARTRATE AND ACETAMINOPHEN 2 TABLET: 7.5; 325 TABLET ORAL at 19:20

## 2023-05-27 RX ADMIN — HYDROCODONE BITARTRATE AND ACETAMINOPHEN 2 TABLET: 7.5; 325 TABLET ORAL at 05:21

## 2023-05-27 RX ADMIN — APIXABAN 2.5 MG: 2.5 TABLET, FILM COATED ORAL at 20:00

## 2023-05-27 RX ADMIN — ATORVASTATIN CALCIUM 20 MG: 10 TABLET, FILM COATED ORAL at 08:12

## 2023-05-27 RX ADMIN — HYDROCODONE BITARTRATE AND ACETAMINOPHEN 2 TABLET: 7.5; 325 TABLET ORAL at 13:31

## 2023-05-27 RX ADMIN — ESCITALOPRAM OXALATE 10 MG: 10 TABLET ORAL at 08:12

## 2023-05-27 RX ADMIN — HYDROCODONE BITARTRATE AND ACETAMINOPHEN 2 TABLET: 7.5; 325 TABLET ORAL at 00:34

## 2023-05-27 RX ADMIN — HYDROMORPHONE HYDROCHLORIDE 0.5 MG: 1 INJECTION, SOLUTION INTRAMUSCULAR; INTRAVENOUS; SUBCUTANEOUS at 21:54

## 2023-05-27 RX ADMIN — KETOROLAC TROMETHAMINE 15 MG: 15 INJECTION, SOLUTION INTRAMUSCULAR; INTRAVENOUS at 05:21

## 2023-05-27 RX ADMIN — HYDROMORPHONE HYDROCHLORIDE 0.5 MG: 1 INJECTION, SOLUTION INTRAMUSCULAR; INTRAVENOUS; SUBCUTANEOUS at 17:27

## 2023-05-27 RX ADMIN — HYDROMORPHONE HYDROCHLORIDE 0.5 MG: 1 INJECTION, SOLUTION INTRAMUSCULAR; INTRAVENOUS; SUBCUTANEOUS at 19:54

## 2023-05-27 RX ADMIN — APIXABAN 2.5 MG: 2.5 TABLET, FILM COATED ORAL at 08:12

## 2023-05-27 RX ADMIN — KETOROLAC TROMETHAMINE 15 MG: 15 INJECTION, SOLUTION INTRAMUSCULAR; INTRAVENOUS at 11:01

## 2023-05-27 RX ADMIN — HYDROCODONE BITARTRATE AND ACETAMINOPHEN 2 TABLET: 7.5; 325 TABLET ORAL at 09:46

## 2023-05-27 RX ADMIN — LIOTHYRONINE SODIUM 10 MCG: 5 TABLET ORAL at 08:12

## 2023-05-27 RX ADMIN — LEVOTHYROXINE SODIUM 300 MCG: 0.15 TABLET ORAL at 05:21

## 2023-05-27 RX ADMIN — TRAZODONE HYDROCHLORIDE 25 MG: 50 TABLET ORAL at 23:21

## 2023-05-27 RX ADMIN — DOCUSATE SODIUM 50MG AND SENNOSIDES 8.6MG 2 TABLET: 8.6; 5 TABLET, FILM COATED ORAL at 15:51

## 2023-05-27 RX ADMIN — Medication 10 ML: at 08:14

## 2023-05-27 RX ADMIN — HYDROCODONE BITARTRATE AND ACETAMINOPHEN 2 TABLET: 7.5; 325 TABLET ORAL at 23:20

## 2023-05-27 RX ADMIN — CEFAZOLIN SODIUM 2 G: 2 INJECTION, SOLUTION INTRAVENOUS at 00:34

## 2023-05-27 RX ADMIN — HYDROMORPHONE HYDROCHLORIDE 0.5 MG: 1 INJECTION, SOLUTION INTRAMUSCULAR; INTRAVENOUS; SUBCUTANEOUS at 11:01

## 2023-05-27 NOTE — THERAPY TREATMENT NOTE
Acute Care - Physical Therapy Treatment Note   [facility]     Patient Name: Amber Poole  : 1965  MRN: [id number]  Today's Date: 2023      Visit Dx:     ICD-10-CM ICD-9-CM   1. Difficulty walking  R26.2 719.7   2. Primary osteoarthritis of left knee  M17.12 715.16   3. Primary osteoarthritis of both knees  M17.0 715.16   4. Decreased activities of daily living (ADL)  Z78.9 V49.89     Patient Active Problem List   Diagnosis   • Hypothyroidism   • Seasonal allergic rhinitis   • Hypercoagulable state   • Arthritis   • Coagulation disorder   • Anemia   • Bilateral carpal tunnel syndrome   • Primary osteoarthritis of left knee   • Osteoarthrosis, localized, primary, knee, left   • Bilateral primary osteoarthritis of knee   • Primary osteoarthritis of both knees     Past Medical History:   Diagnosis Date   • Allergic    • Allergies    • Arthritis    • Bilateral primary osteoarthritis of knee 2023   • Blood clotting disorder     MTHFR-  OVER CLOTTING- TAKES ASA DAILY- SEE'S PCP   • Chronic allergic rhinitis    • Hashimoto's thyroiditis    • Hemorrhoid     - RESOLVED   • Hypercoagulable state    • Hypothyroidism    • Knee pain     BILATERAL   • PONV (postoperative nausea and vomiting)    • Seasonal allergies    • Thyroid disorder    • Torn meniscus 2020    LEFT KNEE     Past Surgical History:   Procedure Laterality Date   • COLONOSCOPY     • HEMORRHOIDECTOMY     • KNEE CARTILAGE SURGERY      BEEN    • LASER ABLATION      UTERINE, NETHERRS   • TONSILLECTOMY     • TOTAL KNEE ARTHROPLASTY Bilateral 2023    Procedure: BILATERAL  TOTAL KNEE ARTHROPLASTY WITH BRYANT ROBOT;  Surgeon: Carl Swartz MD;  Location: [facility];  Service: Robotics - Ortho;  Laterality: Bilateral;   • TUBAL ABDOMINAL LIGATION       PT Assessment (last 12 hours)     PT Evaluation and Treatment     Row Name 23 1421 23 0823       Physical Therapy Time and Intention     Subjective Information complains of;pain  -TS complains of;pain  -TS    Document Type therapy note (daily note)  -TS therapy note (daily note)  -TS    Mode of Treatment individual therapy;physical therapy  -TS individual therapy;physical therapy  -TS    Row Name 05/27/23 1421 05/27/23 0823       Pain    Pretreatment Pain Rating 5/10  -TS 5/10  -TS    Posttreatment Pain Rating 10/10  -TS --    Pain Location - Side/Orientation Bilateral  -TS Bilateral  -TS    Pain Location - knee  -TS knee  -TS    Row Name 05/27/23 1421 05/27/23 0823       Cognition    Affect/Mental Status (Cognition) WNL  -TS WNL  -TS    Row Name 05/27/23 0823          Range of Motion (ROM)    Left Lower Extremity (ROM) knee  L knee 5-90°  -TS     Right Lower Extremity (ROM) knee  R knee 10-85°  -TS     Row Name 05/27/23 1421 05/27/23 0823       Mobility    Extremity Weight-bearing Status right lower extremity;left lower extremity  -TS right lower extremity;left lower extremity  -TS    Left Lower Extremity (Weight-bearing Status) weight-bearing as tolerated (WBAT)  -TS weight-bearing as tolerated (WBAT)  -TS    Row Name 05/27/23 1421 05/27/23 0823       Transfers    Transfers sit-stand transfer;stand-sit transfer  x2  -TS sit-stand transfer;stand-sit transfer  -TS    Row Name 05/27/23 1421 05/27/23 0823       Sit-Stand Transfer    Sit-Stand [place] (Transfers) contact guard;verbal cues  -TS contact guard;verbal cues  -TS    Assistive Device (Sit-Stand Transfers) walker, front-wheeled  -TS walker, front-wheeled  -TS    Row Name 05/27/23 1421 05/27/23 0823       Stand-Sit Transfer    Stand-Sit [place] (Transfers) standby assist;verbal cues  -TS standby assist;verbal cues  -TS    Assistive Device (Stand-Sit Transfers) walker, front-wheeled  -TS walker, front-wheeled  -TS    Row Name 05/27/23 1421 05/27/23 0823       Gait/Stairs (Locomotion)    Gait/Stairs Locomotion gait/ambulation independence;gait/ambulation assistive device;distance  ambulated  -TS gait/ambulation independence;gait/ambulation assistive device;distance ambulated  -TS    [place] Level (Gait) contact guard;verbal cues  -TS contact guard;verbal cues  -TS    Assistive Device (Gait) walker, front-wheeled  -TS walker, front-wheeled  -TS    Distance in Feet (Gait) 210  -TS --    Pattern (Gait) 3-point;step-through  -TS 3-point;step-through  -TS    Deviations/Abnormal Patterns (Gait) antalgic;gait speed decreased;stride length decreased  -TS antalgic;gait speed decreased;stride length decreased  -TS    Bilateral Gait Deviations forward flexed posture  -TS --    Row Name 05/27/23 1421 05/27/23 0823       Safety Issues, Functional Mobility    Impairments Affecting Function (Mobility) balance;endurance/activity tolerance;pain;strength;range of motion (ROM)  -TS balance;endurance/activity tolerance;pain;strength;range of motion (ROM)  -TS    Row Name 05/27/23 1421 05/27/23 0823       Motor Skills    Therapeutic Exercise hip;knee;ankle  AAROM BLE x20 reps, recumbent position  -TS hip;knee;ankle  AAROM BLE x20 reps, recumbant position  -TS    Row Name             Wound Left anterior knee Incision    Wound - Properties Group Side: Left  -LK Orientation: anterior  -LK Location: knee  -LK Primary Wound Type: Incision  -LK Additional Comments: LEFT TOTAL KNEE ARTHROPLASTY  -LK    Retired Wound - Properties Group Side: Left  -LK Orientation: anterior  -LK Location: knee  -LK Primary Wound Type: Incision  -LK Additional Comments: LEFT TOTAL KNEE ARTHROPLASTY  -LK    Retired Wound - Properties Group Side: Left  -LK Location: knee  -LK Primary Wound Type: Incision  -LK Additional Comments: LEFT TOTAL KNEE ARTHROPLASTY  -LK    Row Name             Wound 05/26/23 0953 Right anterior knee Incision    Wound - Properties Group Placement Date: 05/26/23  -SC Placement Time: 0953 -SC Present on Hospital Admission: N  -SC Side: Right  -SC Orientation: anterior  -SC Location: knee  -SC Primary Wound Type:  Incision  -SC    Retired Wound - Properties Group Placement Date: 05/26/23  -SC Placement Time: 0953  -SC Present on Hospital Admission: N  -SC Side: Right  -SC Orientation: anterior  -SC Location: knee  -SC Primary Wound Type: Incision  -SC    Retired Wound - Properties Group Date first assessed: 05/26/23  -SC Time first assessed: 0953  -SC Present on Hospital Admission: N  -SC Side: Right  -SC Location: knee  -SC Primary Wound Type: Incision  -SC    Row Name 05/27/23 1421 05/27/23 0823       Positioning and Restraints    Pre-Treatment Position sitting in chair/recliner  -TS sitting in chair/recliner  -TS    Post Treatment Position chair  -TS chair  -TS    In Chair reclined;call light within reach;exit alarm on;with family/caregiver;compression device;heels elevated  -TS reclined;call light within reach;exit alarm on;with family/caregiver;heels elevated  -TS    Row Name 05/27/23 1421 05/27/23 0823       Progress Summary (PT)    Progress Toward Functional Goals (PT) progress toward functional goals is fair  -TS progress toward functional goals is good  -TS          User Key  (r) = Recorded By, (t) = Taken By, (c) = Cosigned By    Initials Name Provider Type    Noe Wade, RAKESH Physical Therapist Assistant    Blanca Bledsoe, RN Registered Nurse    Cassie Crowley RN Registered Nurse            Bilateral Knee Ther-ex   Exercise  Reps  Sets    Long arc Quads   10 2   Short arc Quads  10 2   Heel Slides  10 2   Ankle Pumps  10 2   Quad sets  10 2   Glut sets  10 2   Straight leg raise  10 2            Physical Therapy Education     Title: PT OT SLP Therapies (Done)     Topic: Physical Therapy (Done)     Point: Mobility training (Done)     Learning Progress Summary           Patient Acceptance, E,TB,D, VU,DU by AC at 5/27/2023 0907    Acceptance, E,TB, VU by RK at 5/27/2023 0335    Acceptance, E,TB, VU by AV at 5/26/2023 1458   Family Acceptance, E,TB, VU by RK at 5/27/2023 0335                    Point: Home exercise program (Done)     Learning Progress Summary           Patient Acceptance, E,TB,D, VU,DU by AC at 5/27/2023 0907    Acceptance, E,TB, VU by RK at 5/27/2023 0335   Family Acceptance, E,TB, VU by RK at 5/27/2023 0335                   Point: Body mechanics (Done)     Learning Progress Summary           Patient Acceptance, E,TB,D, VU,DU by AC at 5/27/2023 0907    Acceptance, E,TB, VU by RK at 5/27/2023 0335    Acceptance, E,TB, VU by AV at 5/26/2023 1458   Family Acceptance, E,TB, VU by RK at 5/27/2023 0335                   Point: Precautions (Done)     Learning Progress Summary           Patient Acceptance, E,TB,D, VU,DU by  at 5/27/2023 0907    Acceptance, E,TB, VU by RK at 5/27/2023 0335    Acceptance, E,TB, VU by AV at 5/26/2023 1458   Family Acceptance, E,TB, VU by RK at 5/27/2023 0335                               User Key     Initials Effective Dates Name Provider Type Discipline     01/16/23 -  Madisyn Mcdaniels, RN Registered Nurse Nurse     06/16/21 -  Gema Vilchis OT Occupational Therapist OT     06/11/21 -  Placido Guerrero, LINETTE Physical Therapist PT              PT Recommendation and Plan     Progress Summary (PT)  Progress Toward Functional Goals (PT): progress toward functional goals is fair   Outcome Measures     Row Name 05/27/23 0830 05/26/23 1400          How much help from another person do you currently need...    Turning from your back to your side while in flat bed without using bedrails? 3  -TS 3  -AV     Moving from lying on back to sitting on the side of a flat bed without bedrails? 3  -TS 3  -AV     Moving to and from a bed to a chair (including a wheelchair)? 3  -TS 3  -AV     Standing up from a chair using your arms (e.g., wheelchair, bedside chair)? 4  -TS 3  -AV     Climbing 3-5 steps with a railing? 2  -TS 2  -AV     To walk in hospital room? 3  -TS 3  -AV     AM-PAC 6 Clicks Score (PT) 18  -TS 17  -AV        Functional Assessment    Outcome Measure Options  -- AM-PAC 6 Clicks Basic Mobility (PT)  -AV           User Key  (r) = Recorded By, (t) = Taken By, (c) = Cosigned By    Initials Name Provider Type    TS Noe Gonzalez, PTA Physical Therapist Assistant    Placido Jnuior, PT Physical Therapist                 Time Calculation:    PT Charges     Row Name 05/27/23 1419 05/27/23 0820          Time Calculation    PT Received On -- 05/27/23  -TS     PT Goal Re-Cert Due Date -- 06/04/23  -TS        Timed Charges    42595 - PT Therapeutic Exercise Minutes 20  -TS 19  -TS     33633 - Gait Training Minutes  8  -TS 8  -TS     78446 - PT Therapeutic Activity Minutes 3  -TS 2  -TS        Total Minutes    Timed Charges Total Minutes 31  -TS 29  -TS      Total Minutes 31  -TS 29  -TS           User Key  (r) = Recorded By, (t) = Taken By, (c) = Cosigned By    Initials Name Provider Type    TS Carlos Noe, PTA Physical Therapist Assistant              Therapy Charges for Today     Code Description Service Date Service Provider Modifiers Qty    95220116978 HC PT THER PROC EA 15 MIN 5/27/2023 Swords, Noe, PTA GP 1    72181432573 HC GAIT TRAINING EA 15 MIN 5/27/2023 Swords, Noe, PTA GP 1    00919041789 HC PT THER PROC EA 15 MIN 5/27/2023 Swords, Noe, PTA GP 1    64529866335 HC GAIT TRAINING EA 15 MIN 5/27/2023 Swords, Noe, PTA GP 1          PT G-Codes  Outcome Measure Options: AM-PAC 6 Clicks Daily Activity (OT), Optimal Instrument  AM-PAC 6 Clicks Score (PT): 18  AM-PAC 6 Clicks Score (OT): 21    Noe Carlos, PTA  5/27/2023

## 2023-05-27 NOTE — THERAPY TREATMENT NOTE
Acute Care - Physical Therapy Treatment Note  [facility]     Patient Name: Amber Poole  : 1965  MRN: [id number]  Today's Date: 2023      Visit Dx:     ICD-10-CM ICD-9-CM   1. Difficulty walking  R26.2 719.7   2. Primary osteoarthritis of left knee  M17.12 715.16     Patient Active Problem List   Diagnosis   • Hypothyroidism   • Seasonal allergic rhinitis   • Hypercoagulable state   • Arthritis   • Coagulation disorder   • Anemia   • Bilateral carpal tunnel syndrome   • Primary osteoarthritis of left knee   • Osteoarthrosis, localized, primary, knee, left   • Bilateral primary osteoarthritis of knee   • Primary osteoarthritis of both knees     Past Medical History:   Diagnosis Date   • Allergic    • Allergies    • Arthritis    • Bilateral primary osteoarthritis of knee 2023   • Blood clotting disorder     MTHFR-  OVER CLOTTING- TAKES ASA DAILY- SEE'S PCP   • Chronic allergic rhinitis    • Hashimoto's thyroiditis    • Hemorrhoid     - RESOLVED   • Hypercoagulable state    • Hypothyroidism    • Knee pain     BILATERAL   • PONV (postoperative nausea and vomiting)    • Seasonal allergies    • Thyroid disorder    • Torn meniscus 2020    LEFT KNEE     Past Surgical History:   Procedure Laterality Date   • COLONOSCOPY     • HEMORRHOIDECTOMY     • KNEE CARTILAGE SURGERY  2020    BEEN    • LASER ABLATION      UTERINE, NETHERRS   • TONSILLECTOMY     • TUBAL ABDOMINAL LIGATION       PT Assessment (last 12 hours)     PT Evaluation and Treatment     Row Name 23 0823          Physical Therapy Time and Intention    Subjective Information complains of;pain  -TS     Document Type therapy note (daily note)  -TS     Mode of Treatment individual therapy;physical therapy  -TS     Row Name 23 0823          Pain    Pretreatment Pain Rating 5/10  -TS     Pain Location - Side/Orientation Bilateral  -TS     Pain Location - knee  -TS     Row Name 23 0823           Cognition    Affect/Mental Status (Cognition) WNL  -TS     Row Name 05/27/23 0823          Range of Motion (ROM)    Left Lower Extremity (ROM) knee  L knee 5-90°  -TS     Right Lower Extremity (ROM) knee  R knee 10-85°  -TS     Row Name 05/27/23 0823          Mobility    Extremity Weight-bearing Status right lower extremity;left lower extremity  -TS     Left Lower Extremity (Weight-bearing Status) weight-bearing as tolerated (WBAT)  -     Row Name 05/27/23 0823          Transfers    Transfers sit-stand transfer;stand-sit transfer  -TS     Row Name 05/27/23 0823          Sit-Stand Transfer    Sit-Stand [place] (Transfers) contact guard;verbal cues  -TS     Assistive Device (Sit-Stand Transfers) walker, front-wheeled  -TS     Row Name 05/27/23 0823          Stand-Sit Transfer    Stand-Sit [place] (Transfers) standby assist;verbal cues  -TS     Assistive Device (Stand-Sit Transfers) walker, front-wheeled  -TS     Row Name 05/27/23 0823          Gait/Stairs (Locomotion)    Gait/Stairs Locomotion gait/ambulation independence;gait/ambulation assistive device;distance ambulated  -     [place] Level (Gait) contact guard;verbal cues  -TS     Assistive Device (Gait) walker, front-wheeled  -TS     Pattern (Gait) 3-point;step-through  -TS     Deviations/Abnormal Patterns (Gait) antalgic;gait speed decreased;stride length decreased  -     Row Name 05/27/23 0823          Safety Issues, Functional Mobility    Impairments Affecting Function (Mobility) balance;endurance/activity tolerance;pain;strength;range of motion (ROM)  -     Row Name 05/27/23 0823          Motor Skills    Therapeutic Exercise hip;knee;ankle  AAROM BLE x20 reps, recumbant position  -     Row Name             Wound Left anterior knee Incision    Wound - Properties Group Side: Left  -LK Orientation: anterior  -LK Location: knee  -LK Primary Wound Type: Incision  -LK Additional Comments: LEFT TOTAL KNEE ARTHROPLASTY  -LK    Retired Wound -  Properties Group Side: Left  -LK Orientation: anterior  -LK Location: knee  -LK Primary Wound Type: Incision  -LK Additional Comments: LEFT TOTAL KNEE ARTHROPLASTY  -LK    Retired Wound - Properties Group Side: Left  -LK Location: knee  -LK Primary Wound Type: Incision  -LK Additional Comments: LEFT TOTAL KNEE ARTHROPLASTY  -LK    Row Name             Wound 05/26/23 0953 Right anterior knee Incision    Wound - Properties Group Placement Date: 05/26/23  -SC Placement Time: 0953 -SC Present on Hospital Admission: N  -SC Side: Right  -SC Orientation: anterior  -SC Location: knee  -SC Primary Wound Type: Incision  -SC    Retired Wound - Properties Group Placement Date: 05/26/23  -SC Placement Time: 0953  -SC Present on Hospital Admission: N  -SC Side: Right  -SC Orientation: anterior  -SC Location: knee  -SC Primary Wound Type: Incision  -SC    Retired Wound - Properties Group Date first assessed: 05/26/23  -SC Time first assessed: 0953  -SC Present on Hospital Admission: N  -SC Side: Right  -SC Location: knee  -SC Primary Wound Type: Incision  -SC    Row Name 05/27/23 0823          Positioning and Restraints    Pre-Treatment Position sitting in chair/recliner  -TS     Post Treatment Position chair  -TS     In Chair reclined;call light within reach;exit alarm on;with family/caregiver;heels elevated  -TS     Row Name 05/27/23 0823          Progress Summary (PT)    Progress Toward Functional Goals (PT) progress toward functional goals is good  -TS           User Key  (r) = Recorded By, (t) = Taken By, (c) = Cosigned By    Initials Name Provider Type    Noe Wade PTA Physical Therapist Assistant    Blanca Bledsoe RN Registered Nurse    Cassie Crowley RN Registered Nurse            Bilateral Knee Ther-ex   Exercise  Reps  Sets    Long arc Quads   10 2   Short arc Quads  10 2   Heel Slides  10 2   Ankle Pumps  10 2   Quad sets  10 2   Glut sets  10 2   Straight leg raise  10 2            Physical  Therapy Education     Title: PT OT SLP Therapies (In Progress)     Topic: Physical Therapy (Done)     Point: Mobility training (Done)     Learning Progress Summary           Patient Acceptance, E,TB, VU by  at 5/27/2023 0335    Acceptance, E,TB, VU by AV at 5/26/2023 1458   Family Acceptance, E,TB, VU by RK at 5/27/2023 0335                   Point: Home exercise program (Done)     Learning Progress Summary           Patient Acceptance, E,TB, VU by RK at 5/27/2023 0335   Family Acceptance, E,TB, VU by RK at 5/27/2023 0335                   Point: Body mechanics (Done)     Learning Progress Summary           Patient Acceptance, E,TB, VU by RK at 5/27/2023 0335    Acceptance, E,TB, VU by KERA at 5/26/2023 1458   Family Acceptance, E,TB, VU by RK at 5/27/2023 0335                   Point: Precautions (Done)     Learning Progress Summary           Patient Acceptance, E,TB, VU by RK at 5/27/2023 0335    Acceptance, E,TB, VU by AV at 5/26/2023 1458   Family Acceptance, E,TB, VU by RK at 5/27/2023 0335                               User Key     Initials Effective Dates Name Provider Type Discipline     01/16/23 -  Madisyn Mcdaniels, RN Registered Nurse Nurse     06/11/21 -  Placido Guerrero, PT Physical Therapist PT              PT Recommendation and Plan     Progress Summary (PT)  Progress Toward Functional Goals (PT): progress toward functional goals is good   Outcome Measures     Row Name 05/27/23 0830 05/26/23 1400          How much help from another person do you currently need...    Turning from your back to your side while in flat bed without using bedrails? 3  -TS 3  -AV     Moving from lying on back to sitting on the side of a flat bed without bedrails? 3  -TS 3  -AV     Moving to and from a bed to a chair (including a wheelchair)? 3  -TS 3  -AV     Standing up from a chair using your arms (e.g., wheelchair, bedside chair)? 4  -TS 3  -AV     Climbing 3-5 steps with a railing? 2  -TS 2  -AV     To walk in  hospital room? 3  -TS 3  -AV     AM-PAC 6 Clicks Score (PT) 18  -TS 17  -AV        Functional Assessment    Outcome Measure Options -- AM-PAC 6 Clicks Basic Mobility (PT)  -AV           User Key  (r) = Recorded By, (t) = Taken By, (c) = Cosigned By    Initials Name Provider Type    TS Noe Gonzalez, PTA Physical Therapist Assistant    AV Placido Guerrero, PT Physical Therapist                 Time Calculation:    PT Charges     Row Name 05/27/23 0820             Time Calculation    PT Received On 05/27/23  -TS      PT Goal Re-Cert Due Date 06/04/23  -TS         Timed Charges    78715 - PT Therapeutic Exercise Minutes 19  -TS      90073 - Gait Training Minutes  8  -TS      29214 - PT Therapeutic Activity Minutes 2  -TS         Total Minutes    Timed Charges Total Minutes 29  -TS       Total Minutes 29  -TS            User Key  (r) = Recorded By, (t) = Taken By, (c) = Cosigned By    Initials Name Provider Type    TS Noe Gonzalez, PTA Physical Therapist Assistant              Therapy Charges for Today     Code Description Service Date Service Provider Modifiers Qty    15954094492 HC PT THER PROC EA 15 MIN 5/27/2023 Noe Gonzalez, PTA GP 1    00346211911 HC GAIT TRAINING EA 15 MIN 5/27/2023 Noe Gonzalez, RAKESH GP 1          PT G-Codes  Outcome Measure Options: AM-PAC 6 Clicks Basic Mobility (PT)  AM-PAC 6 Clicks Score (PT): 18    Noe Gonzalez PTA  5/27/2023

## 2023-05-27 NOTE — PLAN OF CARE
Goal Outcome Evaluation:  Plan of Care Reviewed With: patient, daughter        Progress: improving  Outcome Evaluation: Pt alert and able to make needs known. Pain controlled with scheduled Toradol and PRN Norco. Pt urinating without difficulty. Pt up with one person assist, gait belt and rolling walker-ambulated 95+feet, tolerated well. Pt plan to d/c home 5/28/23.

## 2023-05-27 NOTE — PLAN OF CARE
Goal Outcome Evaluation:         Patient resting in room, family at bedside. Patient's pain increased today, medicated for pain x4. Ambulated with PT x2 and to the bathroom x3. Voiding spontaneously. Medicated with PRN pericolace. Ice, SCD, and hose on bilateral lower extremeties. Call light within reach.

## 2023-05-27 NOTE — THERAPY EVALUATION
Patient Name: Amber Poole  : 1965    MRN: [id number]                              Today's Date: 2023       Admit Date: 2023    Visit Dx:     ICD-10-CM ICD-9-CM   1. Difficulty walking  R26.2 719.7   2. Primary osteoarthritis of left knee  M17.12 715.16   3. Primary osteoarthritis of both knees  M17.0 715.16   4. Decreased activities of daily living (ADL)  Z78.9 V49.89     Patient Active Problem List   Diagnosis   • Hypothyroidism   • Seasonal allergic rhinitis   • Hypercoagulable state   • Arthritis   • Coagulation disorder   • Anemia   • Bilateral carpal tunnel syndrome   • Primary osteoarthritis of left knee   • Osteoarthrosis, localized, primary, knee, left   • Bilateral primary osteoarthritis of knee   • Primary osteoarthritis of both knees     Past Medical History:   Diagnosis Date   • Allergic    • Allergies    • Arthritis    • Bilateral primary osteoarthritis of knee 2023   • Blood clotting disorder     MTHFR-  OVER CLOTTING- TAKES ASA DAILY- SEE'S PCP   • Chronic allergic rhinitis    • Hashimoto's thyroiditis    • Hemorrhoid     - RESOLVED   • Hypercoagulable state    • Hypothyroidism    • Knee pain     BILATERAL   • PONV (postoperative nausea and vomiting)    • Seasonal allergies    • Thyroid disorder    • Torn meniscus 2020    LEFT KNEE     Past Surgical History:   Procedure Laterality Date   • COLONOSCOPY     • HEMORRHOIDECTOMY     • KNEE CARTILAGE SURGERY      BEEN    • LASER ABLATION      UTERINE, NETHERRS   • TONSILLECTOMY     • TOTAL KNEE ARTHROPLASTY Bilateral 2023    Procedure: BILATERAL  TOTAL KNEE ARTHROPLASTY WITH BRYANT ROBOT;  Surgeon: Carl Swartz MD;  Location: [facility];  Service: Robotics - Ortho;  Laterality: Bilateral;   • TUBAL ABDOMINAL LIGATION        General Information     Row Name 23 0909 23 0858       OT Time and Intention    Document Type therapy note (daily note)  -AC evaluation  -AC     Mode of Treatment individual therapy;occupational therapy  -AC individual therapy;occupational therapy  -AC    Row Name 05/27/23 0909 05/27/23 0858       General Information    Patient Profile Reviewed yes  -AC yes  -AC    Prior Level of Function -- independent:;all household mobility;community mobility;ADL's  patient states she has  awalk in shower with seat and BSC.  -AC    Existing Precautions/Restrictions fall;weight bearing  -AC fall;weight bearing  -AC    Barriers to Rehab none identified  -AC none identified  -AC    Row Name 05/27/23 0858          Occupational Profile    Reason for Services/Referral (Occupational Profile) Pt. is a 57year old female admitted for the above diagnosis status post bilateral total knee replacement on 5/26/2023. Pt. referred to OT services to assess independence with ADLs and adl transfers/fx'l mobility. No previous OT services for current condition.  -AC     Row Name 05/27/23 0858          Living Environment    People in Home spouse  -AC     Row Name 05/27/23 0858          Home Main Entrance    Number of Stairs, Main Entrance none  -AC     Row Name 05/27/23 0909 05/27/23 0858       Cognition    Orientation Status (Cognition) oriented x 3  -AC oriented x 3  -AC    Row Name 05/27/23 0909 05/27/23 0858       Safety Issues, Functional Mobility    Impairments Affecting Function (Mobility) balance;pain;endurance/activity tolerance  -AC balance;pain;endurance/activity tolerance  -AC          User Key  (r) = Recorded By, (t) = Taken By, (c) = Cosigned By    Initials Name Provider Type    AC Gema Vilchis OT Occupational Therapist                 Mobility/ADL's     Row Name 05/27/23 0916 05/27/23 0859       Bed Mobility    Comment, (Bed Mobility) Patient sitting in recliner upon OT arrival in the room.  -AC Patient sitting in recliner upon OT arrival in the room.  -AC    Row Name 05/27/23 0916 05/27/23 0859       Transfers    Transfers sit-stand transfer  -AC sit-stand transfer  -AC     Row Name 05/27/23 0916 05/27/23 0859       Sit-Stand Transfer    Sit-Stand [place] (Transfers) contact guard;verbal cues  -AC contact guard;verbal cues  -AC    Assistive Device (Sit-Stand Transfers) walker, front-wheeled  -AC walker, front-wheeled  -AC    Comment, (Sit-Stand Transfer) Patient was contact-guard assist with sit to/from stand with use of rolling walker and cues for safety as well as technique.  -AC --    Row Name 05/27/23 0916 05/27/23 0859       Functional Mobility    Functional Mobility- Ind. Level contact guard assist;1 person;verbal cues required  -AC contact guard assist;1 person  -AC    Functional Mobility- Device walker, front-wheeled  -AC walker, front-wheeled  -AC    Functional Mobility- Comment Patient was contact-guard assist with rolling walker for recliner to/from sink with cues for safety and technique.  -AC --    Row Name 05/27/23 0916 05/27/23 0859       Activities of Daily Living    BADL Assessment/Intervention lower body dressing;upper body dressing;grooming  -AC --  Patient is set up for self-feeding, set up for grooming, set up for upper body bathing/dressing, min assist for lower body bathing/dressing, contact-guard assist for toileting.  -AC    Row Name 05/27/23 0916 05/27/23 0859       Mobility    Extremity Weight-bearing Status right lower extremity;left lower extremity  -AC right lower extremity;left lower extremity  -AC    Left Lower Extremity (Weight-bearing Status) weight-bearing as tolerated (WBAT)  -AC weight-bearing as tolerated (WBAT)  -AC    Right Lower Extremity (Weight-bearing Status) weight-bearing as tolerated (WBAT)  -AC weight-bearing as tolerated (WBAT)  -    Row Name 05/27/23 0916          Lower Body Dressing Assessment/Training    [place] Level (Lower Body Dressing) lower body dressing skills;doff;don;pants/bottoms;shoes/slippers;socks;minimum assist (75% patient effort);verbal cues  -AC     Position (Lower Body Dressing) unsupported  sitting;supported standing  -     Row Name 05/27/23 0916          Upper Body Dressing Assessment/Training    [place] Level (Upper Body Dressing) upper body dressing skills;doff;don;bra/undergarment;pull-over garment;set up  -     Row Name 05/27/23 0916          Grooming Assessment/Training    [place] Level (Grooming) grooming skills;oral care regimen;verbal cues  -AC     Position (Grooming) supported standing;sink side  -           User Key  (r) = Recorded By, (t) = Taken By, (c) = Cosigned By    Initials Name Provider Type     Gema Vilchis OT Occupational Therapist               Obj/Interventions     Row Name 05/27/23 0918 05/27/23 0900       Sensory Assessment (Somatosensory)    Sensory Assessment (Somatosensory) sensation intact  - sensation intact  -[facility] Name 05/27/23 0918 05/27/23 0900       Vision Assessment/Intervention    Visual Impairment/Limitations WFL  -AC WFL  -AC    Row Name 05/27/23 0918 05/27/23 0900       Range of Motion Comprehensive    General Range of Motion bilateral upper extremity ROM WNL  -AC bilateral upper extremity ROM WNL  -AC    Row Name 05/27/23 0900          Strength Comprehensive (MMT)    General Manual Muscle Testing (MMT) Assessment no strength deficits identified  -     Row Name 05/27/23 0918 05/27/23 0900       Motor Skills    Motor Skills coordination;functional endurance  -AC coordination;functional endurance  -AC    Coordination WFL  -AC WFL  -AC    Functional Endurance f+  -AC fair plus  -    Row Name 05/27/23 0918 05/27/23 0900       Balance    Balance Assessment standing dynamic balance  -AC standing dynamic balance  -AC    Dynamic Standing Balance contact guard;1-person assist;verbal cues  -AC contact guard  -AC    Position/Device Used, Standing Balance supported;walker, front-wheeled  -AC supported;walker, front-wheeled  -AC    Balance Interventions standing;sit to stand;supported;dynamic;minimal challenge;occupation based/functional task   -AC --          User Key  (r) = Recorded By, (t) = Taken By, (c) = Cosigned By    Initials Name Provider Type    AC Gema Vilchis, JENNA Occupational Therapist               Goals/Plan     Row Name 05/27/23 0901          Transfer Goal 1 (OT)    Activity/Assistive Device (Transfer Goal 1, OT) transfers, all  -AC     [place] Level/Cues Needed (Transfer Goal 1, OT) modified independence  -AC     Time Frame (Transfer Goal 1, OT) long term goal (LTG);10 days  -AC     Row Name 05/27/23 0901          Bathing Goal 1 (OT)    Activity/Device (Bathing Goal 1, OT) bathing skills, all  -AC     [place] Level/Cues Needed (Bathing Goal 1, OT) modified independence  -AC     Time Frame (Bathing Goal 1, OT) long term goal (LTG);10 days  -AC     Row Name 05/27/23 0901          Dressing Goal 1 (OT)    Activity/Device (Dressing Goal 1, OT) dressing skills, all  -AC     [place]/Cues Needed (Dressing Goal 1, OT) modified independence  -AC     Time Frame (Dressing Goal 1, OT) long term goal (LTG);10 days  -AC     Row Name 05/27/23 0901          Toileting Goal 1 (OT)    Activity/Device (Toileting Goal 1, OT) toileting skills, all  -AC     [place] Level/Cues Needed (Toileting Goal 1, OT) modified independence  -AC     Time Frame (Toileting Goal 1, OT) long term goal (LTG);10 days  -AC     Row Name 05/27/23 0901          Grooming Goal 1 (OT)    Activity/Device (Grooming Goal 1, OT) grooming skills, all  -AC     [place] (Grooming Goal 1, OT) modified independence  -AC     Time Frame (Grooming Goal 1, OT) long term goal (LTG);10 days  -AC     Row Name 05/27/23 0901          Problem Specific Goal 1 (OT)    Problem Specific Goal 1 (OT) Patient will improve activity tolerance to good for ADLs.  -AC     Time Frame (Problem Specific Goal 1, OT) long term goal (LTG);10 days  -AC     Row Name 05/27/23 0901          Therapy Assessment/Plan (OT)    Planned Therapy Interventions (OT) activity tolerance training;BADL  retraining;functional balance retraining;occupation/activity based interventions;ROM/therapeutic exercise;patient/caregiver education/training  -           User Key  (r) = Recorded By, (t) = Taken By, (c) = Cosigned By    Initials Name Provider Type     Gema Vilchis, JENNA Occupational Therapist               Clinical Impression     Row Name 05/27/23 0918 05/27/23 0900       Pain Assessment    Pretreatment Pain Rating 1/10  -AC 1/10  -AC    Posttreatment Pain Rating 1/10  -AC 1/10  -AC    Pain Location - Side/Orientation Bilateral  -AC Bilateral  -AC    Pain Location - knee  -AC knee  -AC    Row Name 05/27/23 0918 05/27/23 0900       Plan of Care Review    Plan of Care Reviewed With patient;daughter  -AC patient;daughter  -AC    Progress improving  - no change  -    Outcome Evaluation Patient instructed in lower body adaptive dressing technique, weightbearing status, joint protection and safety with ADL transfers.  Patient to continue with therapy services in order to maximize independence with ADLs.  - Patient presents with limitations that impede his/her ability to perform ADLS. The skills of a therapist are necessary to maximize independence with ADLs.  Recommend home with assist and outpatient therapy services following hospital discharge.  -    Row Name 05/27/23 0900          Therapy Assessment/Plan (OT)    Patient/Family Therapy Goal Statement (OT) Patient wants to walk without pain, being independent and playing with grandkids.  -     Rehab Potential (OT) good, to achieve stated therapy goals  -     Criteria for Skilled Therapeutic Interventions Met (OT) yes;meets criteria;skilled treatment is necessary  -     Therapy Frequency (OT) 5 times/wk  -     Row Name 05/27/23 0900          Therapy Plan Review/Discharge Plan (OT)    Equipment Needs Upon Discharge (OT) walker, rolling;commode chair  -     Anticipated Discharge Disposition (OT) home with assist;home with outpatient therapy services   -AC     Row Name 05/27/23 0900          Positioning and Restraints    Pre-Treatment Position sitting in chair/recliner  -AC     Post Treatment Position chair  -AC     In Chair reclined;call light within reach;encouraged to call for assist;exit alarm on;legs elevated  -AC           User Key  (r) = Recorded By, (t) = Taken By, (c) = Cosigned By    Initials Name Provider Type    AC Gema Vilchis OT Occupational Therapist               Outcome Measures     Row Name 05/27/23 0904          How much help from another is currently needed...    Putting on and taking off regular lower body clothing? 3  -AC     Bathing (including washing, rinsing, and drying) 3  -AC     Toileting (which includes using toilet bed pan or urinal) 3  -AC     Putting on and taking off regular upper body clothing 4  -AC     Taking care of personal grooming (such as brushing teeth) 4  -AC     Eating meals 4  -AC     AM-PAC 6 Clicks Score (OT) 21  -AC     Row Name 05/27/23 0830          How much help from another person do you currently need...    Turning from your back to your side while in flat bed without using bedrails? 3  -TS     Moving from lying on back to sitting on the side of a flat bed without bedrails? 3  -TS     Moving to and from a bed to a chair (including a wheelchair)? 3  -TS     Standing up from a chair using your arms (e.g., wheelchair, bedside chair)? 4  -TS     Climbing 3-5 steps with a railing? 2  -TS     To walk in hospital room? 3  -TS     AM-PAC 6 Clicks Score (PT) 18  -TS     Highest level of mobility 6 --> Walked 10 steps or more  -TS     Row Name 05/27/23 0904          Functional Assessment    Outcome Measure Options AM-PAC 6 Clicks Daily Activity (OT);Optimal Instrument  -AC     Row Name 05/27/23 0904          Optimal Instrument    Optimal Instrument Optimal - 3  -AC     Bending/Stooping 2  -AC     Standing 2  -AC     Reaching 1  -AC     From the list, choose the 3 activities you would most like to be able to do without  any difficulty Bending/stooping;Standing;Reaching  -AC     Total Score Optimal - 3 5  -AC           User Key  (r) = Recorded By, (t) = Taken By, (c) = Cosigned By    Initials Name Provider Type    Noe Wade PTA Physical Therapist Assistant    Gema Edward OT Occupational Therapist                Occupational Therapy Education     Title: PT OT SLP Therapies (Done)     Topic: Occupational Therapy (Done)     Point: ADL training (Done)     Description:   Instruct learner(s) on proper safety adaptation and remediation techniques during self care or transfers.   Instruct in proper use of assistive devices.              Learning Progress Summary           Patient Acceptance, E,TB,D, VU,DU by SAGE at 5/27/2023 0907    Acceptance, E,TB, VU by MADELNY at 5/27/2023 0335   Family Acceptance, E,TB, VU by MADELYN at 5/27/2023 0335                   Point: Home exercise program (Done)     Description:   Instruct learner(s) on appropriate technique for monitoring, assisting and/or progressing therapeutic exercises/activities.              Learning Progress Summary           Patient Acceptance, E,TB,D, VU,DU by SAGE at 5/27/2023 0907    Acceptance, E,TB, VU by MADELYN at 5/27/2023 0335   Family Acceptance, E,TB, VU by MADELYN at 5/27/2023 0335                   Point: Precautions (Done)     Description:   Instruct learner(s) on prescribed precautions during self-care and functional transfers.              Learning Progress Summary           Patient Acceptance, E,TB,D, VU,DU by SAGE at 5/27/2023 0907    Acceptance, E,TB, VU by MADELYN at 5/27/2023 0335   Family Acceptance, E,TB, VU by MADELYN at 5/27/2023 0335                   Point: Body mechanics (Done)     Description:   Instruct learner(s) on proper positioning and spine alignment during self-care, functional mobility activities and/or exercises.              Learning Progress Summary           Patient Acceptance, E,TB,D, VU,DU by SAGE at 5/27/2023 0907    Acceptance, E,TB, VU by MADELYN at 5/27/2023 0335    Family Acceptance, E,TB, VU by  at 5/27/2023 0335                               User Key     Initials Effective Dates Name Provider Type Discipline     01/16/23 -  Madisyn Mcdaniels, RN Registered Nurse Nurse     06/16/21 -  Gema Vilchis OT Occupational Therapist OT              OT Recommendation and Plan  Planned Therapy Interventions (OT): activity tolerance training, BADL retraining, functional balance retraining, occupation/activity based interventions, ROM/therapeutic exercise, patient/caregiver education/training  Therapy Frequency (OT): 5 times/wk  Plan of Care Review  Plan of Care Reviewed With: patient, daughter  Progress: improving  Outcome Evaluation: Patient instructed in lower body adaptive dressing technique, weightbearing status, joint protection and safety with ADL transfers.  Patient to continue with therapy services in order to maximize independence with ADLs.     Time Calculation:    Time Calculation- OT     Row Name 05/27/23 0908 05/27/23 0820          Time Calculation- OT    OT Received On 05/27/23  -AC --     OT Goal Re-Cert Due Date 06/05/23  -AC --        Timed Charges    76889 - Gait Training Minutes  -- 8  -TS     45765 - OT Therapeutic Activity Minutes 10  -AC --     78847 - OT Self Care/Mgmt Minutes 15  -AC --        Untimed Charges    OT Eval/Re-eval Minutes 32  -AC --        Total Minutes    Timed Charges Total Minutes 25  -AC 8  -TS     Untimed Charges Total Minutes 32  -AC --      Total Minutes 57  -AC 8  -TS           User Key  (r) = Recorded By, (t) = Taken By, (c) = Cosigned By    Initials Name Provider Type    TS Noe Gonzalez, RAKESH Physical Therapist Assistant     Gema Vilchis OT Occupational Therapist              Therapy Charges for Today     Code Description Service Date Service Provider Modifiers Qty    17275302732 HC OT THERAPEUTIC ACT EA 15 MIN 5/27/2023 Gema Vilchis OT GO 1    24042348490 HC OT SELF CARE/MGMT/TRAIN EA 15 MIN 5/27/2023 Gema Vilchis OT GO 1     [id number]  OT EVAL LOW COMPLEXITY 3 5/27/2023 Gema Vilchis, OT GO 1               Gema Vilchis OT  5/27/2023

## 2023-05-27 NOTE — PROGRESS NOTES
[facility]   Hospitalist Progress Note  Date: 2023  Patient Name: Amber Poole  : 1965  MRN: [id number]  Date of admission: 2023      Subjective   Subjective     Chief Complaint: Follow-up bilateral knee pain    Summary:Amber Poole is a 57 y.o. female past medical history significant for hypothyroidism, MTHFR clotting disorder, bilateral osteoarthritis of the knees presents following bilateral knee arthroplasty.  Patient had some issues with postoperative nausea which is not out of the ordinary for the patient per family.  Resolved with antiemetics.  Patient having difficulty with pain control.  Working well physical therapy occupational therapy hoping to return home with outpatient therapy on discharge once pain controlled with p.o. analgesics.    Interval Followup: Patient sitting up in bedside chair resting comfortably.  Per patient had a lot of issues with pain control this morning improved with Dilaudid that makes patient very sleepy.  Patient states she is having more pain in her right knee than her left with associated swelling.  No further issues with nausea this morning.  Afebrile overnight.  Heart rate within normal limits.  Blood pressure within normal limits.  Satting well on room air.  Hemoglobin trended down slightly.  Potassium slightly low this morning.  No other issues per nursing.    Review of Systems  Constitutional: Negative for fatigue and fever.   HENT: Negative for sore throat and trouble swallowing.    Eyes: Negative for pain and discharge.   Respiratory: Negative for cough and shortness of breath.    Cardiovascular: Negative for chest pain and palpitations.   Gastrointestinal: Negative for abdominal pain, nausea and vomiting.   Endocrine: Negative for cold intolerance and heat intolerance.   Genitourinary: Negative for difficulty urinating and dysuria.   Musculoskeletal: Negative for back pain and neck stiffness.  Bilateral knee pain right worse than  left  Skin: Negative for color change and rash.   Neurological: Negative for syncope and headaches.   Hematological: Negative for adenopathy.   Psychiatric/Behavioral: Negative for confusion and hallucinations.    Objective   Objective     Vitals:   Temp:  [97.8 °F (36.6 °C)-99.2 °F (37.3 °C)] 97.9 °F (36.6 °C)  Heart Rate:  [74-84] 80  Resp:  [15-16] 16  BP: ()/(56-73) 125/71  Flow (L/min):  [1] 1  Physical Exam   Gen. well-developed appearing stated age in no acute distress  HEENT: Normocephalic atraumatic moist membranes pupils equal round reactive light, no scleral icterus no conjunctival injection  Cardiovascular: regular rate and rhythm no murmurs rubs or gallops S1-S2, no lower extremity edema appreciated  Pulmonary: Clear to auscultation bilaterally no wheezes rales or rhonchi symmetric chest expansion, unlabored, no conversational dyspnea appreciated  Gastrointestinal: Soft nontender nondistended positive bowel sounds all 4 quadrants no rebound or guarding  Musculoskeletal: No clubbing cyanosis, warm and well-perfused, calves soft symmetric nontender bilaterally, surgical dressing clean dry intact over bilateral knees  Skin: Clean dry without rashs  Neuro: Cranial nerves II through XII intact grossly no sensorimotor deficits appreciated bilateral upper and lower extremities  Psych: Patient is calm cooperative and appropriate with exam not responding to internal stimuli  : No Kincaid catheter no bladder distention no suprapubic tenderness    Result Review    Result Review:  I have personally reviewed these results and agree with these findings:  [x]  Laboratory  LAB RESULTS:      Lab 05/26/23 2247   WBC 12.79*   HEMOGLOBIN 11.0*   HEMATOCRIT 33.5*   PLATELETS 248   MCV 89.3         Lab 05/27/23  0428   SODIUM 137   POTASSIUM 3.7   CHLORIDE 105   CO2 24.6   ANION GAP 7.4   BUN 11   CREATININE 0.70   EGFR 102.3   GLUCOSE 114*   CALCIUM 8.6   PHOSPHORUS 2.6         Lab 05/27/23  0428   ALBUMIN 3.3*                      Brief Urine Lab Results     None        Microbiology Results (last 10 days)     ** No results found for the last 240 hours. **          []  Microbiology  [x]  Radiology  XR Knee 1 or 2 View Left    Result Date: 5/26/2023   Interval left knee arthroplasty without complicating features.      BERNARDINO PACE MD       Electronically Signed and Approved By: BERNARDINO PACE MD on 5/26/2023 at 11:53             XR Knee 1 or 2 View Right    Result Date: 5/26/2023   Interval right knee arthroplasty without immediate complication.      BERNARDINO PACE MD       Electronically Signed and Approved By: BERNARDINO PACE MD on 5/26/2023 at 11:55               []  EKG/Telemetry   []  Cardiology/Vascular   []  Pathology  []  Old records  [x]  Other:  Scheduled Meds:acetaminophen, 1,000 mg, Oral, Q6H  apixaban, 2.5 mg, Oral, Q12H  atorvastatin, 20 mg, Oral, Daily  escitalopram, 10 mg, Oral, Daily  levothyroxine, 300 mcg, Oral, Once per day on Mon Tue Wed Thu Fri Sat  liothyronine, 10 mcg, Oral, QAM AC  montelukast, 10 mg, Oral, Nightly  sodium chloride, 10 mL, Intravenous, Q12H      Continuous Infusions:lactated ringers, 80 mL/hr, Last Rate: Stopped (05/27/23 0343)      PRN Meds:.•  bisacodyl  •  bisacodyl  •  HYDROcodone-acetaminophen  •  HYDROcodone-acetaminophen  •  HYDROmorphone  •  magnesium hydroxide  •  [DISCONTINUED] Morphine **AND** naloxone  •  ondansetron **OR** ondansetron  •  senna-docusate sodium  •  sodium chloride  •  sodium chloride  •  traZODone      Assessment & Plan   Assessment / Plan     Assessment/Plan:  Primary osteoarthritis bilateral knees now status post bilateral knee arthroplasty  Postop nausea and vomiting  Hypothyroidism  MTHFR clotting disorder        Patient admitted for postoperative pain management as well as physical therapy occupational therapy  Orthopedics consulted thank you for your recommendations  Continue total joint protocol  Continue pain control with oral analgesics IV for  breakthrough  Continue to encourage activity  Continue to encourage incentive spirometry  Physical therapy occupational therapy consulted to evaluate and treat  Antibiotics and dressing changes per orthopedics  DVT prophylaxis with Eliquis for 2 weeks before transitioning to aspirin 325 for 2 weeks and then back to home aspirin due to MTHFR  Continue antiemetics as needed  Continue home Cytomel and levothyroxine  Continue home atorvastatin  Further inpatient orders recommendations pending clinical course.     Discussed plan with bedside RN.    Disposition: Home with outpatient physical therapy once pain controlled with p.o. analgesics    DVT prophylaxis:  Medical and mechanical DVT prophylaxis orders are present.    CODE STATUS:   Code Status (Patient has no pulse and is not breathing): CPR (Attempt to Resuscitate)  Medical Interventions (Patient has pulse or is breathing): Full Support  Release to patient: Routine Release

## 2023-05-27 NOTE — PLAN OF CARE
Goal Outcome Evaluation:  Plan of Care Reviewed With: patient, daughter        Progress: no change  Outcome Evaluation: Patient presents with limitations that impede his/her ability to perform ADLS. The skills of a therapist are necessary to maximize independence with ADLs.  Recommend home with assist and outpatient therapy services following hospital discharge.

## 2023-05-28 VITALS
HEART RATE: 95 BPM | WEIGHT: 185.41 LBS | SYSTOLIC BLOOD PRESSURE: 125 MMHG | HEIGHT: 67 IN | OXYGEN SATURATION: 97 % | RESPIRATION RATE: 16 BRPM | DIASTOLIC BLOOD PRESSURE: 73 MMHG | BODY MASS INDEX: 29.1 KG/M2 | TEMPERATURE: 98.1 F

## 2023-05-28 PROCEDURE — 97530 THERAPEUTIC ACTIVITIES: CPT

## 2023-05-28 PROCEDURE — 99214 OFFICE O/P EST MOD 30 MIN: CPT | Performed by: FAMILY MEDICINE

## 2023-05-28 PROCEDURE — 94799 UNLISTED PULMONARY SVC/PX: CPT

## 2023-05-28 PROCEDURE — 97116 GAIT TRAINING THERAPY: CPT

## 2023-05-28 PROCEDURE — 97535 SELF CARE MNGMENT TRAINING: CPT

## 2023-05-28 PROCEDURE — 97110 THERAPEUTIC EXERCISES: CPT

## 2023-05-28 RX ADMIN — HYDROCODONE BITARTRATE AND ACETAMINOPHEN 2 TABLET: 7.5; 325 TABLET ORAL at 03:33

## 2023-05-28 RX ADMIN — ESCITALOPRAM OXALATE 10 MG: 10 TABLET ORAL at 08:03

## 2023-05-28 RX ADMIN — HYDROCODONE BITARTRATE AND ACETAMINOPHEN 2 TABLET: 7.5; 325 TABLET ORAL at 07:38

## 2023-05-28 RX ADMIN — ATORVASTATIN CALCIUM 20 MG: 10 TABLET, FILM COATED ORAL at 08:03

## 2023-05-28 RX ADMIN — APIXABAN 2.5 MG: 2.5 TABLET, FILM COATED ORAL at 08:03

## 2023-05-28 RX ADMIN — DOCUSATE SODIUM 50MG AND SENNOSIDES 8.6MG 2 TABLET: 8.6; 5 TABLET, FILM COATED ORAL at 03:33

## 2023-05-28 RX ADMIN — LIOTHYRONINE SODIUM 10 MCG: 5 TABLET ORAL at 08:03

## 2023-05-28 RX ADMIN — Medication 10 ML: at 08:28

## 2023-05-28 NOTE — THERAPY TREATMENT NOTE
Acute Care - Physical Therapy Progress Note   Hyun     Patient Name: Amber Poole  : 1965  MRN: [id number]  Today's Date: 2023      Visit Dx:     ICD-10-CM ICD-9-CM   1. Difficulty walking  R26.2 719.7   2. Primary osteoarthritis of left knee  M17.12 715.16   3. Primary osteoarthritis of both knees  M17.0 715.16   4. Decreased activities of daily living (ADL)  Z78.9 V49.89     Patient Active Problem List   Diagnosis   • Hypothyroidism   • Seasonal allergic rhinitis   • Hypercoagulable state   • Arthritis   • Coagulation disorder   • Anemia   • Bilateral carpal tunnel syndrome   • Primary osteoarthritis of left knee   • Osteoarthrosis, localized, primary, knee, left   • Bilateral primary osteoarthritis of knee   • Primary osteoarthritis of both knees     Past Medical History:   Diagnosis Date   • Allergic    • Allergies    • Arthritis    • Bilateral primary osteoarthritis of knee 2023   • Blood clotting disorder     MTHFR-  OVER CLOTTING- TAKES ASA DAILY- SEE'S PCP   • Chronic allergic rhinitis    • Hashimoto's thyroiditis    • Hemorrhoid     - RESOLVED   • Hypercoagulable state    • Hypothyroidism    • Knee pain     BILATERAL   • PONV (postoperative nausea and vomiting)    • Seasonal allergies    • Thyroid disorder    • Torn meniscus 2020    LEFT KNEE     Past Surgical History:   Procedure Laterality Date   • COLONOSCOPY     • HEMORRHOIDECTOMY     • KNEE CARTILAGE SURGERY      BEEN    • LASER ABLATION      UTERINE, NETHERRS   • TONSILLECTOMY     • TOTAL KNEE ARTHROPLASTY Bilateral 2023    Procedure: BILATERAL  TOTAL KNEE ARTHROPLASTY WITH BRYANT ROBOT;  Surgeon: Carl Swartz MD;  Location: [facility];  Service: Robotics - Ortho;  Laterality: Bilateral;   • TUBAL ABDOMINAL LIGATION       PT Assessment (last 12 hours)     PT Evaluation and Treatment     Row Name 23 1200          Physical Therapy Time and Intention    Subjective  Information complains of;pain  -CS     Document Type therapy note (daily note)  -CS     Mode of Treatment individual therapy;physical therapy  -CS     Patient Effort good  -CS     Symptoms Noted During/After Treatment increased pain  -CS     Row Name 05/28/23 1200          Pain    Pretreatment Pain Rating 6/10  -CS     Posttreatment Pain Rating 8/10  -CS     Pain Location - Side/Orientation Bilateral  R greater than L  -CS     Pain Location - knee  -CS     Pain Intervention(s) Repositioned;Distraction;Cold applied;Cold pack  -CS     Row Name 05/28/23 1200          Transfers    Transfers car transfer  -CS     Row Name 05/28/23 1200          Sit-Stand Transfer    Sit-Stand Pope (Transfers) contact guard  -CS     Assistive Device (Sit-Stand Transfers) walker, front-wheeled  -CS     Row Name 05/28/23 1200          Stand-Sit Transfer    Stand-Sit Pope (Transfers) contact guard  -CS     Assistive Device (Stand-Sit Transfers) walker, front-wheeled  -CS     Row Name 05/28/23 1200          Car Transfer    Type (Car Transfer) sit-stand;stand-sit  -CS     Pope Level (Car Transfer) verbal cues;nonverbal cues (demo/gesture);minimum assist (75% patient effort);1 person assist  -CS     Assistive Device (Car Transfer) walker, front-wheeled  -CS     Row Name 05/28/23 1200          Gait/Stairs (Locomotion)    Pope Level (Gait) contact guard  -CS     Assistive Device (Gait) walker, front-wheeled  -CS     Distance in Feet (Gait) 460  -CS     Pattern (Gait) 4-point;step-to  -CS     Deviations/Abnormal Patterns (Gait) antalgic;gait speed decreased;stride length decreased  -CS     Bilateral Gait Deviations forward flexed posture  -CS     Left Sided Gait Deviations decreased knee extension;heel strike decreased  -CS     Right Sided Gait Deviations decreased knee extension;heel strike decreased  -CS     Handrail Location (Stairs) both sides  -CS     Number of Steps (Stairs) 10  -CS     Ascending Technique  (Stairs) step-to-step  -     Descending Technique (Stairs) step-to-step  -     Stairs, Impairments ROM decreased;strength decreased;impaired balance;pain;decreased flexibility  -     Row Name 05/28/23 1200          Hip (Therapeutic Exercise)    Hip (Therapeutic Exercise) isometric exercises;AAROM (active assistive range of motion)  -     Hip AAROM (Therapeutic Exercise) bilateral;supine;10 repetitions;5 repetitions  heel slides  -     Hip Isometrics (Therapeutic Exercise) bilateral;gluteal sets;supine;10 repetitions;5 repetitions;3 second hold  -     Row Name 05/28/23 1200          Knee (Therapeutic Exercise)    Knee (Therapeutic Exercise) AAROM (active assistive range of motion);isometric exercises  -     Knee AAROM (Therapeutic Exercise) bilateral;sitting;supine;10 repetitions;5 repetitions  LAQ's, SLR's, heel slides  -     Knee Isometrics (Therapeutic Exercise) bilateral;quad sets;supine;10 repetitions;5 repetitions;3 second hold  -     Row Name 05/28/23 1200          Ankle (Therapeutic Exercise)    Ankle (Therapeutic Exercise) AROM (active range of motion)  -     Ankle AROM (Therapeutic Exercise) bilateral;dorsiflexion;plantarflexion;supine;15 repititions  -     Row Name             Wound Left anterior knee Incision    Wound - Properties Group Side: Left  -LK Orientation: anterior  -LK Location: knee  -LK Primary Wound Type: Incision  -LK Additional Comments: LEFT TOTAL KNEE ARTHROPLASTY  -LK    Retired Wound - Properties Group Side: Left  -LK Orientation: anterior  -LK Location: knee  -LK Primary Wound Type: Incision  -LK Additional Comments: LEFT TOTAL KNEE ARTHROPLASTY  -LK    Retired Wound - Properties Group Side: Left  -LK Location: knee  -LK Primary Wound Type: Incision  -LK Additional Comments: LEFT TOTAL KNEE ARTHROPLASTY  -LK    Row Name             Wound 05/26/23 0953 Right anterior knee Incision    Wound - Properties Group Placement Date: 05/26/23 -SC Placement Time: 0953 -SC  Present on Hospital Admission: N  -SC Side: Right  -SC Orientation: anterior  -SC Location: knee  -SC Primary Wound Type: Incision  -SC    Retired Wound - Properties Group Placement Date: 05/26/23  -SC Placement Time: 0953 -SC Present on Hospital Admission: N  -SC Side: Right  -SC Orientation: anterior  -SC Location: knee  -SC Primary Wound Type: Incision  -SC    Retired Wound - Properties Group Date first assessed: 05/26/23  -SC Time first assessed: 0953 -SC Present on Hospital Admission: N  -SC Side: Right  -SC Location: knee  -SC Primary Wound Type: Incision  -SC    Row Name 05/28/23 1200          Positioning and Restraints    Pre-Treatment Position sitting in chair/recliner  -CS     Post Treatment Position chair  -CS     In Chair reclined;call light within reach;encouraged to call for assist;exit alarm on;with family/caregiver  -CS     Row Name 05/28/23 1200          Progress Summary (PT)    Progress Toward Functional Goals (PT) progress toward functional goals is good  -CS           User Key  (r) = Recorded By, (t) = Taken By, (c) = Cosigned By    Initials Name Provider Type    SC Blanca Means, RN Registered Nurse    Bridger Sharpe PTA Physical Therapist Assistant    LK Cassie Gould, RN Registered Nurse                Physical Therapy Education     Title: PT OT SLP Therapies (Done)     Topic: Physical Therapy (Done)     Point: Mobility training (Done)     Learning Progress Summary           Patient Acceptance, E,TB, VU by MADELYN at 5/27/2023 2359    Acceptance, E,TB,D, VU,DU by SAGE at 5/27/2023 0907    Acceptance, E,TB, VU by RK at 5/27/2023 0335    Acceptance, E,TB, VU by KERA at 5/26/2023 1458   Family Acceptance, E,TB, VU by RK at 5/27/2023 0335                   Point: Home exercise program (Done)     Learning Progress Summary           Patient Acceptance, E,TB, VU by RK at 5/27/2023 2359    Acceptance, E,TB,D, VU,DU by SAGE at 5/27/2023 0907    Acceptance, E,TB, VU by RK at 5/27/2023 0335   Family  Acceptance, E,TB, VU by RK at 5/27/2023 0335                   Point: Body mechanics (Done)     Learning Progress Summary           Patient Acceptance, E,TB, VU by RK at 5/27/2023 2359    Acceptance, E,TB,D, VU,DU by AC at 5/27/2023 0907    Acceptance, E,TB, VU by RK at 5/27/2023 0335    Acceptance, E,TB, VU by AV at 5/26/2023 1458   Family Acceptance, E,TB, VU by RK at 5/27/2023 0335                   Point: Precautions (Done)     Learning Progress Summary           Patient Acceptance, E,TB, VU by RK at 5/27/2023 2359    Acceptance, E,TB,D, VU,DU by AC at 5/27/2023 0907    Acceptance, E,TB, VU by RK at 5/27/2023 0335    Acceptance, E,TB, VU by AV at 5/26/2023 1458   Family Acceptance, E,TB, VU by RK at 5/27/2023 0335                               User Key     Initials Effective Dates Name Provider Type Discipline     01/16/23 -  Madisyn Mcdaniels, RN Registered Nurse Nurse     06/16/21 -  Gema Vilchis, JENNA Occupational Therapist OT     06/11/21 -  Placido Guerrero, PT Physical Therapist PT              PT Recommendation and Plan     Progress Summary (PT)  Progress Toward Functional Goals (PT): progress toward functional goals is good   Outcome Measures     Row Name 05/28/23 1200 05/27/23 0830 05/26/23 1400       How much help from another person do you currently need...    Turning from your back to your side while in flat bed without using bedrails? 3  -CS 3  -TS 3  -AV    Moving from lying on back to sitting on the side of a flat bed without bedrails? 3  -CS 3  -TS 3  -AV    Moving to and from a bed to a chair (including a wheelchair)? 3  -CS 3  -TS 3  -AV    Standing up from a chair using your arms (e.g., wheelchair, bedside chair)? 3  -CS 4  -TS 3  -AV    Climbing 3-5 steps with a railing? 3  -CS 2  -TS 2  -AV    To walk in hospital room? 3  -CS 3  -TS 3  -AV    AM-PAC 6 Clicks Score (PT) 18  -CS 18  -TS 17  -AV       Functional Assessment    Outcome Measure Options AM-PAC 6 Clicks Basic Mobility (PT)  -CS  -- AM-PAC 6 Clicks Basic Mobility (PT)  -AV          User Key  (r) = Recorded By, (t) = Taken By, (c) = Cosigned By    Initials Name Provider Type    TS Noe Gonzalez PTA Physical Therapist Assistant    AV Placido Guerrero, PT Physical Therapist    CS Bridger Miller PTA Physical Therapist Assistant                 Time Calculation:    PT Charges     Row Name 05/28/23 1215             Time Calculation    Start Time 0843  -CS      PT Received On 05/28/23  -CS         Timed Charges    06158 - PT Therapeutic Exercise Minutes 30  -CS      06090 - Gait Training Minutes  16  -CS      48256 - PT Therapeutic Activity Minutes 10  -CS      28475 - PT Self Care/Mgmt Minutes 18  -CS         Total Minutes    Timed Charges Total Minutes 74  -CS       Total Minutes 74  -CS            User Key  (r) = Recorded By, (t) = Taken By, (c) = Cosigned By    Initials Name Provider Type    CS Bridger Miller PTA Physical Therapist Assistant              Therapy Charges for Today     Code Description Service Date Service Provider Modifiers Qty    90942590003 HC PT THER PROC EA 15 MIN 5/28/2023 Bridger Miller PTA GP 2    12285452622 HC GAIT TRAINING EA 15 MIN 5/28/2023 Bridger Miller PTA GP 1    47209849792 HC PT THERAPEUTIC ACT EA 15 MIN 5/28/2023 Bridger Miller PTA GP 1    22665494929 HC PT SELF CARE/MGMT/TRAIN EA 15 MIN 5/28/2023 Bridger Miller PTA GP 1          PT G-Codes  Outcome Measure Options: AM-PAC 6 Clicks Basic Mobility (PT)  AM-PAC 6 Clicks Score (PT): 18  AM-PAC 6 Clicks Score (OT): 21    Bridger Miller PTA  5/28/2023

## 2023-05-28 NOTE — PLAN OF CARE
Goal Outcome Evaluation:      Pt discharging home. Voiding, ambulated with PTx2. Family at bedside. Call light within reach.

## 2023-05-28 NOTE — DISCHARGE SUMMARY
[facility]         HOSPITALIST  DISCHARGE SUMMARY    Patient Name: Amber Poole  : 1965  MRN: [id number]    Date of Admission: 2023  Date of Discharge: 2023  Primary Care Physician: Faith Ramos APRN    Consults     Date and Time Order Name Status Description    2023 12:38 PM Inpatient Hospitalist Consult            Active and Resolved Hospital Problems:  Primary osteoarthritis bilateral knees now status post bilateral knee arthroplasty  Postop nausea and vomiting  Hypothyroidism  MTHFR clotting disorder  Active Hospital Problems    Diagnosis POA   • **Primary osteoarthritis of left knee [M17.12] Unknown   • Primary osteoarthritis of both knees [M17.0] Yes   • Bilateral primary osteoarthritis of knee [M17.0] Unknown      Resolved Hospital Problems   No resolved problems to display.       Hospital Course     Hospital Course:  Amber Poole is a 57 y.o. female  past medical history significant for hypothyroidism, MTHFR clotting disorder, bilateral osteoarthritis of the knees presents following bilateral knee arthroplasty.  Patient had some issues with postoperative nausea which is not out of the ordinary for the patient per family.  Resolved with antiemetics.  Patient initially had difficulty with pain control.    Worked well physical therapy occupational therapy hoping to return home with outpatient therapy on discharge.  Pain became controlled with oral analgesics.  Patient seen and evaluated on date of discharge and thought stable for discharge home to follow-up with orthopedics as scheduled on 2023.        DISCHARGE Follow Up Recommendations for labs and diagnostics: As above      Day of Discharge     Vital Signs:  Temp:  [97.3 °F (36.3 °C)-98.9 °F (37.2 °C)] 98.1 °F (36.7 °C)  Heart Rate:  [] 95  Resp:  [16] 16  BP: (109-169)/(64-86) 125/73  Physical Exam:   Gen. well-developed appearing stated age in no acute distress  HEENT:  Normocephalic atraumatic moist membranes pupils equal round reactive light, no scleral icterus no conjunctival injection  Cardiovascular: regular rate and rhythm no murmurs rubs or gallops S1-S2, no lower extremity edema appreciated  Pulmonary: Clear to auscultation bilaterally no wheezes rales or rhonchi symmetric chest expansion, unlabored, no conversational dyspnea appreciated  Gastrointestinal: Soft nontender nondistended positive bowel sounds all 4 quadrants no rebound or guarding  Musculoskeletal: No clubbing cyanosis, warm and well-perfused, calves soft symmetric nontender bilaterally, surgical dressing clean dry intact over bilateral knees  Skin: Clean dry without rashs  Neuro: Cranial nerves II through XII intact grossly no sensorimotor deficits appreciated bilateral upper and lower extremities  Psych: Patient is calm cooperative and appropriate with exam not responding to internal stimuli  : No Kincaid catheter no bladder distention no suprapubic tenderness         Discharge Details        Discharge Medications      New Medications      Instructions Start Date   aspirin 325 MG tablet  Commonly known as: Goyo Aspirin  Replaces: aspirin 81 MG EC tablet   Take 1 tablet by mouth Daily after completing Eliquis      Eliquis 2.5 MG tablet tablet  Generic drug: apixaban   2.5 mg, Oral, Every 12 Hours Scheduled      HYDROcodone-acetaminophen 7.5-325 MG per tablet  Commonly known as: Norco   1 tablet, Oral, Every 4 Hours PRN         Changes to Medications      Instructions Start Date   GNP Stool Softener 240 MG capsule  Generic drug: docusate calcium  What changed: See the new instructions.   TAKE ONE CAPSULE BY MOUTH DAILY AS NEEDED FOR CONSTIPATION      traZODone 50 MG tablet  Commonly known as: DESYREL  What changed:   · how much to take  · how to take this  · when to take this  · reasons to take this   TAKE 1/2 TO 1 TABLET BY MOUTH AT BEDTIME         Continue These Medications      Instructions Start Date    ascorbic acid 500 MG tablet  Commonly known as: VITAMIN C   Take 2 tablets by mouth Daily.      atorvastatin 20 MG tablet  Commonly known as: Lipitor   20 mg, Oral, Daily      docusate sodium 100 MG capsule  Commonly known as: COLACE   100 mg, Oral, Nightly      escitalopram 10 MG tablet  Commonly known as: Lexapro   10 mg, Oral, Daily      liothyronine 5 MCG tablet  Commonly known as: CYTOMEL   10 mcg, Oral, Daily      loratadine 10 MG tablet  Commonly known as: CLARITIN   10 mg, Oral, Daily      montelukast 10 MG tablet  Commonly known as: SINGULAIR   10 mg, Oral, Every Night at Bedtime      norethindrone-ethinyl estradiol 1-5 MG-MCG tablet  Commonly known as: FEMHRT 1/5   1 tablet, Oral, Daily      norethindrone-ethinyl estradiol 1-5 MG-MCG tablet  Commonly known as: FEMHRT 1/5   1 tablet, Oral, Daily      Synthroid 300 MCG tablet  Generic drug: levothyroxine   300 mcg, Oral, Takes 6 days per week-hold on sunday      Vitamin D3 50 MCG (2000 UT) capsule   2,000 Units, Oral, Daily      Zinc 50 MG capsule   50 mg, Oral, Daily         Stop These Medications    aspirin 81 MG EC tablet  Replaced by: aspirin 325 MG tablet     celecoxib 100 MG capsule  Commonly known as: CeleBREX     Diclofenac Sodium 1 % gel gel  Commonly known as: VOLTAREN            Allergies   Allergen Reactions   • Codeine Anaphylaxis and Rash   • Penicillins Rash       Discharge Disposition:  Home or Self Care    Diet:  Hospital:  Diet Order   Procedures   • Diet: Regular/House Diet; Texture: Regular Texture (IDDSI 7); Fluid Consistency: Thin (IDDSI 0)       Discharge Activity:   Activity Instructions     Gradually Increase Activity Until at Pre-Hospitalization Level            CODE STATUS:  Code Status and Medical Interventions:   Ordered at: 05/26/23 1709     Code Status (Patient has no pulse and is not breathing):    CPR (Attempt to Resuscitate)     Medical Interventions (Patient has pulse or is breathing):    Full Support     Release to  patient:    Routine Release         Future Appointments   Date Time Provider Department Center   6/9/2023  1:00 PM [place]izzy At[place] [facility] [place] [facility]   8/9/2023  6:30 PM [facility]O [facility]   9/7/2023  9:00 AM Faith Ramos [place]N [facility] [place] [facility]           Pertinent  and/or Most Recent Results     PROCEDURES:      Carl Swartz MD   Physician  Orthopedics  Op Note     Signed  Date of Service:  05/26/23 1113  Creation Time:  05/26/23 1113     Signed          TOTAL KNEE ARTHROPLASTY WITH BRYANT ROBOT  Procedure Report     Patient Name:  Amber Poole  YOB: 1965     Date of Surgery:  5/26/2023        Pre-op Diagnosis:   Primary osteoarthritis of BILATERAL KNEE       Post-Op Diagnosis Codes:     * Primary osteoarthritis of left knee [M17.12]     * Bilateral primary osteoarthritis of knee [M17.0]     Procedure/CPT® Codes:        Procedure(s):  BILATERAL  TOTAL KNEE ARTHROPLASTY WITH BRYANT ROBOT     Surgical Approach: Knee Medial Parapatellar           Staff:  Surgeon(s):  Carl Swartz MD     Assistant: Deysi Gustafson RN; Evin Trejo RN     Anesthesia: General with Block     Estimated Blood Loss: 50ml     Implants:    Implant Name Type Inv. Item Serial No.  Lot No. LRB No. Used Action   CMT BONE PALACOS R HI/VISC 1X40 - [id number] Implant CMT BONE PALACOS R HI/VISC 1X40   [facility] [id number] Left 1 Implanted   CMT BONE PALACOS R HI/VISC 1X40 - [id number] Implant CMT BONE PALACOS R HI/VISC 1X40   [facility] [id number] Right 2 Implanted   COMP FEM/KN PERSONA CR CMT NRW SZ6 LT - [id number] Implant COMP FEM/KN PERSONA CR CMT NRW SZ6 LT   [employer] [id number] Left 1 Implanted   STEM TIB/KN PERSONA CMT 5D SZD LT - [id number] Implant STEM TIB/KN PERSONA CMT 5D SZD LT   [employer] [id number] Left 1 Implanted   PAT KN PERSONA ALLPOLY CMT 8X29MM - [id number] Implant PAT KN PERSONA ALLPOLY CMT 8X29MM   [employer] [id number] Left 1 Implanted   ART/SRF  KN PERSONA/VE PS CD/CR 6TO7 10MM LT - [id number] Implant ART/SRF KN PERSONA/VE PS CD/CR 6TO7 10MM LT   [employer] [id number] Left 1 Implanted   ART/SRF KN PERSONA/VE MC EF 6TO7 10MM RT - [id number] Implant ART/SRF KN PERSONA/VE MC EF 6TO7 10MM RT   [employer] [id number] Right 1 Implanted   COMP FEM/KN PERSONA CR CMT NRW SZ6 RT - [id number] Implant COMP FEM/KN PERSONA CR CMT NRW SZ6 RT   [employer] [id number] Right 1 Implanted   STEM TIB/KN PERSONA CMT 5D SZD RT - [id number] Implant STEM TIB/KN PERSONA CMT 5D SZD RT   [employer] [id number] Right 1 Implanted   PAT KN PERSONA ALLPOLY CMT 8X29MM - [id number] Implant PAT KN PERSONA ALLPOLY CMT 8X29MM   [employer] [id number] Right 1 Implanted         Specimen:          None     Findings: See description     Complications: None     Description of Procedure: Patient was taken to the operating room placed supine operating table after abductor canal blocks and in preoperative holding.  After general tracheal anesthesia was established the left knee and lower extremity were prepped and draped in standard surgical fashion using alcohol ChloraPrep.  The Cassidy robot was also draped sterilely and calibrated.  Incision was made 2 fingerbreadth superior superior pole of the patella down to the medial aspect of tibial tubercle the knife and full-thickness skin flaps were raised laterally and medially.  A medial parapatellar arthrotomy was then undertaken and the knee was brought into flexion.  Retractors were placed to protect protect the collateral ligaments.  Soft tissue releases and osteophytes removed as deemed necessary.  Then the tracking device was mounted on the distal femur in a standard fashion as well as through separate stab incisions in the distal tibia 5 fingerbreadths inferior to the tibial tubercle.  Then all the femoral points were mapped out using the Cassidy software the tibial points were mapped out as well.  While the plan for resection was being completed the  patella was everted calipered and cut to the appropriate thickness.  The correct size patella was chosen in the locals for the patella were reamed and a trial patella was placed and the knee was brought back into flexion.  The distal femoral cutting block was then brought in using robotic assisted arm and the distal femoral cut was made it was checked and verified and accepted.  Then the external rotation arm of the robot was used to pin the distal femur and the correct external rotation decided by the intraoperative plan using the previously mapped points.  Then the tibia was cut after removing the robotic arm and to the appropriate position to cut the tibia the cutting block was pinned and the proximal tibia cut was made excess bone from the cut was removed and the 4-in-1 cutting block was then used in the distal femur.  The tibial cut was verified and accepted femoral and tibial trials were then placed and the knee was taken through range of motion verifying flexion extension gaps and extension and flexion range of motion to be acceptable by using the software in a standard fashion.  Locals for the femur were then drilled the trials were removed the bone ends were copiously irrigated with bacitracin Simpulse irrigation.  The tibia was cemented in place according to marked external rotation from the trials the femur was cemented in position second and then the real polychosen was placed.  Excess cement removed from the implant edges the knee was held in extension until the cement dried and the patella was cemented into place and held with a patellar clamp.  After the cement hardened excess management from the implant edges Irrisept was used and wound was copiously irrigated the knee was taken through range of motion and checked 1 last time the patella tracked in the center of the trochlear groove the femur using no thumbs technique.  Then the arthrotomy was closed in 45 degrees of flexion with Ethibond the deep  fat was closed 0 Vicryl subcu was closed with 2-0 Vicryl and the skin was closed with staples incision was washed and dried and sterile dressings were applied.  the right knee and lower extremity were prepped and draped in standard surgical fashion using alcohol ChloraPrep.  The Cassidy robot was also draped sterilely and calibrated.  Incision was made 2 fingerbreadth superior superior pole of the patella down to the medial aspect of tibial tubercle the knife and full-thickness skin flaps were raised laterally and medially.  A medial parapatellar arthrotomy was then undertaken and the knee was brought into flexion.  Retractors were placed to protect protect the collateral ligaments.  Soft tissue releases and osteophytes removed as deemed necessary.  Then the tracking device was mounted on the distal femur in a standard fashion as well as through separate stab incisions in the distal tibia 5 fingerbreadths inferior to the tibial tubercle.  Then all the femoral points were mapped out using the Cassidy software the tibial points were mapped out as well.  While the plan for resection was being completed the patella was everted calipered and cut to the appropriate thickness.  The correct size patella was chosen in the locals for the patella were reamed and a trial patella was placed and the knee was brought back into flexion.  The distal femoral cutting block was then brought in using robotic assisted arm and the distal femoral cut was made it was checked and verified and accepted.  Then the external rotation arm of the robot was used to pin the distal femur and the correct external rotation decided by the intraoperative plan using the previously mapped points.  Then the tibia was cut after removing the robotic arm and to the appropriate position to cut the tibia the cutting block was pinned and the proximal tibia cut was made excess bone from the cut was removed and the 4-in-1 cutting block was then used in the distal femur.   The tibial cut was verified and accepted femoral and tibial trials were then placed and the knee was taken through range of motion verifying flexion extension gaps and extension and flexion range of motion to be acceptable by using the software in a standard fashion.  Locals for the femur were then drilled the trials were removed the bone ends were copiously irrigated with bacitracin Simpulse irrigation.  The tibia was cemented in place according to marked external rotation from the trials the femur was cemented in position second and then the real polychosen was placed.  Excess cement removed from the implant edges the knee was held in extension until the cement dried and the patella was cemented into place and held with a patellar clamp.  After the cement hardened excess management from the implant edges Irrisept was used and wound was copiously irrigated the knee was taken through range of motion and checked 1 last time the patella tracked in the center of the trochlear groove the femur using no thumbs technique.  Then the arthrotomy was closed in 45 degrees of flexion with Ethibond the deep fat was closed 0 Vicryl subcu was closed with 2-0 Vicryl and the skin was closed with staples incision was washed and dried and sterile dressings were applied  The patient tolerated the procedure well and was taken to recovery room.        Carl Swartz MD      Date: 5/26/2023  Time: 11:13 EDT                     LAB RESULTS:      Lab 05/26/23  2247   WBC 12.79*   HEMOGLOBIN 11.0*   HEMATOCRIT 33.5*   PLATELETS 248   MCV 89.3         Lab 05/27/23  0428   SODIUM 137   POTASSIUM 3.7   CHLORIDE 105   CO2 24.6   ANION GAP 7.4   BUN 11   CREATININE 0.70   EGFR 102.3   GLUCOSE 114*   CALCIUM 8.6   PHOSPHORUS 2.6         Lab 05/27/23  0428   ALBUMIN 3.3*                     Brief Urine Lab Results     None        Microbiology Results (last 10 days)     ** No results found for the last 240 hours. **          XR Knee 1 or 2 View  Left    Result Date: 5/26/2023  Impression:  Interval left knee arthroplasty without complicating features.      BERNARDINO PACE MD       Electronically Signed and Approved By: BERNARDINO PACE MD on 5/26/2023 at 11:53             XR Knee 1 or 2 View Right    Result Date: 5/26/2023  Impression:  Interval right knee arthroplasty without immediate complication.      BERNARDINO PACE MD       Electronically Signed and Approved By: BERNARDINO PACE MD on 5/26/2023 at 11:55             MRI Knee Right Without Contrast    Result Date: 5/12/2023  Impression:   1. Complex tear of the anterior horn and body of the lateral meniscus with significant extrusion.  2. Moderate tricompartmental osteoarthritis, most pronounced along the lateral facet and apex of the patellar cartilage and overlying the lateral femoral condyle.  An intra-articular body is present within the posterior joint recess. 3. Patellar tendinitis. 4. Moderate to large sized joint effusion.     TERESA MEHTA MD       Electronically Signed and Approved By: TERESA MEHTA MD on 5/12/2023 at 15:16                           Labs Pending at Discharge:        Time spent on Discharge including face to face service: Greater than 35 minutes    Electronically signed by Bernardino Harris MD, 05/28/23, 10:43 AM EDT.

## 2023-05-28 NOTE — PLAN OF CARE
Goal Outcome Evaluation:  Plan of Care Reviewed With: patient        Progress: improving  Outcome Evaluation: Pt alert and able to make needs known. Pain controlled with PRN Norco and Dilaudid, pt educated importance of utilizing ice packs and taking PO pain meds to control pain-pt stated understanding. Pt urinating without difficulty. Pt up with one person assist, rolling walker and gait belt-ambulated 100+feet, tolerated well. Dressings to bilateral knees clean, dry and intact.

## 2023-05-28 NOTE — DISCHARGE INSTRUCTIONS
Please refer to handout for further instructions.  You can change the dressing on 5/29/23. Clean incision with alcohol and apply new surgical dressing provided. Leave in place for three days. On 6/2/23 you can change the dressing daily.  You may shower on 5/29/23.

## 2023-05-31 ENCOUNTER — TELEPHONE (OUTPATIENT)
Dept: ORTHOPEDIC SURGERY | Facility: CLINIC | Age: 58
End: 2023-05-31

## 2023-05-31 DIAGNOSIS — M17.0 PRIMARY OSTEOARTHRITIS OF BOTH KNEES: ICD-10-CM

## 2023-05-31 RX ORDER — HYDROCODONE BITARTRATE AND ACETAMINOPHEN 7.5; 325 MG/1; MG/1
1 TABLET ORAL EVERY 4 HOURS PRN
Qty: 42 TABLET | Refills: 0 | Status: SHIPPED | OUTPATIENT
Start: 2023-05-31

## 2023-06-06 ENCOUNTER — TELEPHONE (OUTPATIENT)
Dept: FAMILY MEDICINE CLINIC | Facility: CLINIC | Age: 58
End: 2023-06-06
Payer: COMMERCIAL

## 2023-06-06 RX ORDER — AMITRIPTYLINE HYDROCHLORIDE 25 MG/1
25 TABLET, FILM COATED ORAL NIGHTLY
Qty: 30 TABLET | Refills: 0 | Status: SHIPPED | OUTPATIENT
Start: 2023-06-06

## 2023-06-06 RX ORDER — AMITRIPTYLINE HYDROCHLORIDE 25 MG/1
25 TABLET, FILM COATED ORAL NIGHTLY
COMMUNITY
End: 2023-06-06 | Stop reason: SDUPTHER

## 2023-06-06 NOTE — TELEPHONE ENCOUNTER
Caller: Amber Poole    Relationship: Self    Best call back number: [phone]    Requested Prescriptions:   Requested Prescriptions      No prescriptions requested or ordered in this encounter        Pharmacy where request should be sent:      Last office visit with prescribing clinician: 5/10/2023   Last telemedicine visit with prescribing clinician: Visit date not found   Next office visit with prescribing clinician: 9/7/2023     Additional details provided by patient: PATIENT HAS HAD DOUBLE KNEE REPLACEMENT SURGERY AND SHE IS ALREADY TAKING TRAZADONE. SHE NEEDS ADDITIONAL PAIN MEDICATION OR ADVICE SHE CAN'T SLEEP AT NIGHT. PLEASE CALL PATIENT BACK AND ADVISE. IT IS MORE SHE CAN'T SLEEP NOT THE PAIN. LIKE THE HYDROCODONE MIGHT BE INTERACTING WITH TRAZADONE MAKING IT NOT AS EFFECTIVE.  PLEASE CALL PATIENT BACK AND ADVISE.    Does the patient have less than a 3 day supply:  [] Yes  [] No    Would you like a call back once the refill request has been completed: [] Yes [] No    If the office needs to give you a call back, can they leave a voicemail: [] Yes [] No    Brodie Mcguire   06/06/23 10:54 EDT

## 2023-06-06 NOTE — TELEPHONE ENCOUNTER
States she can't sleep and it is not because of the pain- she thinks she is having adverse side effect of trazodone and pain med. Takes pain q 4 hours and it is working for her pain. Can't sleep, pain management advise her to call us because we RX Trazodone.

## 2023-06-07 ENCOUNTER — TELEPHONE (OUTPATIENT)
Dept: FAMILY MEDICINE CLINIC | Facility: CLINIC | Age: 58
End: 2023-06-07
Payer: COMMERCIAL

## 2023-06-07 NOTE — TELEPHONE ENCOUNTER
Caller: Amber Poole    Relationship: Self    Best call back number: [phone]    What is the best time to reach you  ANY TIME    Who are you requesting to speak with (clinical staff, provider,  specific staff member): CLINICAL        What was the call regarding:MESSAGE WAS SENT THROUGH MY CHART REGARDING SLEEP MEDICATION THAT IS NOT WORKING THROUGH THE NIGHT  CALL WAS TRANSFERRED AS WELL AS ENCOUNTER BEING SENT       Is it okay if the provider responds through MyChart: YES

## 2023-06-09 ENCOUNTER — OFFICE VISIT (OUTPATIENT)
Dept: ORTHOPEDIC SURGERY | Facility: CLINIC | Age: 58
End: 2023-06-09
Payer: COMMERCIAL

## 2023-06-09 ENCOUNTER — TELEPHONE (OUTPATIENT)
Dept: ORTHOPEDIC SURGERY | Facility: CLINIC | Age: 58
End: 2023-06-09
Payer: COMMERCIAL

## 2023-06-09 VITALS
HEIGHT: 67 IN | OXYGEN SATURATION: 93 % | HEART RATE: 97 BPM | BODY MASS INDEX: 29.03 KG/M2 | WEIGHT: 185 LBS | DIASTOLIC BLOOD PRESSURE: 79 MMHG | SYSTOLIC BLOOD PRESSURE: 119 MMHG

## 2023-06-09 DIAGNOSIS — M17.0 PRIMARY OSTEOARTHRITIS OF BOTH KNEES: ICD-10-CM

## 2023-06-09 DIAGNOSIS — M25.561 RIGHT KNEE PAIN, UNSPECIFIED CHRONICITY: Primary | ICD-10-CM

## 2023-06-09 DIAGNOSIS — M25.562 LEFT KNEE PAIN, UNSPECIFIED CHRONICITY: ICD-10-CM

## 2023-06-09 DIAGNOSIS — Z96.653 S/P TOTAL KNEE ARTHROPLASTY, BILATERAL: ICD-10-CM

## 2023-06-09 RX ORDER — HYDROCODONE BITARTRATE AND ACETAMINOPHEN 7.5; 325 MG/1; MG/1
1 TABLET ORAL EVERY 4 HOURS PRN
Qty: 42 TABLET | Refills: 0 | Status: SHIPPED | OUTPATIENT
Start: 2023-06-09

## 2023-06-09 RX ORDER — CELECOXIB 100 MG/1
1 CAPSULE ORAL EVERY 12 HOURS SCHEDULED
COMMUNITY
Start: 2023-05-31

## 2023-06-09 NOTE — PROGRESS NOTES
"Chief Complaint  Follow-up of the Left Knee, Follow-up and Pain of the Right Knee, and Suture / Staple Removal    Subjective      Amber Poole presents to [facility] for 2-week follow-up of bilateral total knee arthroplasty with Cassidy wilson with Dr. Swartz on 5/26/2023.  Patient is doing well and is ambulatory with use of a walker and nonantalgic gait.  She is attending physical therapy at [facility].  Reports that her left is doing better than the right and having less pain.  She is needing a refill on pain medication.    Objective   Allergies   Allergen Reactions   • Codeine Anaphylaxis and Rash   • Penicillins Rash       Vital Signs:   /79   Pulse 97   Ht 170.2 cm (67\")   Wt 83.9 kg (185 lb)   SpO2 93%   BMI 28.98 kg/m²       Physical Exam    Constitutional: Awake, alert. Well nourished appearance.    Integumentary: Warm, dry, intact. No obvious rashes.    HENT: Atraumatic, normocephalic.   Respiratory: Non labored respirations .   Cardiovascular: Intact peripheral pulses.    Psychiatric: Normal mood and affect. A&O X3    Ortho Exam  Bilateral knees: Incisions are well approximated and healing appropriately.  There is no redness, drainage or tenderness to the touch.  Mild edema generalized over the knees.  Left knee range of motion 0 to 95 degrees, right knee range of motion 0 to 90 degrees.  Stable to varus and valgus stress.  Sensation is intact.  Peripheral pulses are intact.    Imaging Results (Most Recent)     Procedure Component Value Units Date/Time    XR Knee 3 View Left [[id number]] Resulted: 06/09/23 1349     Updated: 06/09/23 1350    Narrative:      X-Ray Report:  Study: X-rays ordered, taken in the office, and reviewed today.   Site: Left knee xray  Indication: Left TKA  View: AP/Lateral, Standing and Sunrise view(s)  Findings: Intact left total knee arthroplasty. No evidence of hardware   malfunction or periprosthetic fractures.  Patella centrally " tracking.  Prior studies available for comparison: yes       XR Knee 3 View Right [[id number]] Resulted: 06/09/23 1348     Updated: 06/09/23 1349    Narrative:      X-Ray Report:  Study: X-rays ordered, taken in the office, and reviewed today.   Site: Right knee xray  Indication: Right TKA  View: AP/Lateral, Standing and Sunrise view(s)  Findings: Intact right total knee arthroplasty. No evidence of hardware   malfunction or periprosthetic fractures.  Patella is centrally tracking.  Prior studies available for comparison: yes                       Assessment and Plan   Problem List Items Addressed This Visit    None  Visit Diagnoses     Right knee pain, unspecified chronicity    -  Primary    Relevant Orders    XR Knee 3 View Right (Completed)    Left knee pain, unspecified chronicity        Relevant Orders    XR Knee 3 View Left (Completed)    S/P total knee arthroplasty, bilateral              Follow Up   Return in about 4 weeks (around 7/7/2023).    Patient is a non-smoker, did not discuss options for smoking cessation.     Social History     Socioeconomic History   • Marital status:    Tobacco Use   • Smoking status: Never   • Smokeless tobacco: Never   Vaping Use   • Vaping Use: Never used   Substance and Sexual Activity   • Alcohol use: Never   • Drug use: Never   • Sexual activity: Defer       Patient Instructions   X-rays taken and reviewed, showing intact hardware.    Staples removed in office and Steri-Strips applied.  Patient educated on incision care.  Please keep incision clean and dry.  Do not soak or submerge in water until incision is fully healed.  Do not apply creams or lotions over the incision.  Please allow Steri-Strips to fall off on their own within 7 to 10 days.    Continue icing and elevation of the knee as needed to help with pain and swelling.  Ice knee up to 3 or 4 times daily for no longer than 15 to 20 minutes at a time.    Continue PT to progress ROM, strength, and weightbearing  status.    Follow-up in 4 weeks. Repeat x-rays not needed at this visit.  Please call with questions or concerns.     Patient was given instructions and counseling regarding her condition or for health maintenance advice. Please see specific information pulled into the AVS if appropriate.

## 2023-06-09 NOTE — PATIENT INSTRUCTIONS
X-rays taken and reviewed, showing intact hardware.    Staples removed in office and Steri-Strips applied.  Patient educated on incision care.  Please keep incision clean and dry.  Do not soak or submerge in water until incision is fully healed.  Do not apply creams or lotions over the incision.  Please allow Steri-Strips to fall off on their own within 7 to 10 days.    Continue icing and elevation of the knee as needed to help with pain and swelling.  Ice knee up to 3 or 4 times daily for no longer than 15 to 20 minutes at a time.    Continue PT to progress ROM, strength, and weightbearing status.    Follow-up in 4 weeks. Repeat x-rays not needed at this visit.  Please call with questions or concerns.

## 2023-06-09 NOTE — TELEPHONE ENCOUNTER
PATIENT WAS SEEN IN THE OFFICE TODAY BY MONICA CASH. REFILL FOR NORCO 7.5/325MG 1 TAB Q4H PRN #42 WRITTEN ON CHECK OUT FORM.   [facility] IN [place].

## 2023-06-19 ENCOUNTER — OFFICE VISIT (OUTPATIENT)
Dept: FAMILY MEDICINE CLINIC | Facility: CLINIC | Age: 58
End: 2023-06-19
Payer: COMMERCIAL

## 2023-06-19 VITALS
OXYGEN SATURATION: 98 % | WEIGHT: 182 LBS | TEMPERATURE: 97.7 F | RESPIRATION RATE: 16 BRPM | HEIGHT: 67 IN | SYSTOLIC BLOOD PRESSURE: 136 MMHG | HEART RATE: 86 BPM | DIASTOLIC BLOOD PRESSURE: 87 MMHG | BODY MASS INDEX: 28.56 KG/M2

## 2023-06-19 DIAGNOSIS — F51.01 PRIMARY INSOMNIA: Primary | ICD-10-CM

## 2023-06-19 RX ORDER — UREA 10 %
3 LOTION (ML) TOPICAL NIGHTLY
COMMUNITY

## 2023-06-19 RX ORDER — SENNOSIDES 8.6 MG
1300 CAPSULE ORAL NIGHTLY
COMMUNITY

## 2023-06-19 RX ORDER — TRAZODONE HYDROCHLORIDE 100 MG/1
100 TABLET ORAL NIGHTLY
COMMUNITY

## 2023-06-19 NOTE — PROGRESS NOTES
"Chief Complaint  Insomnia (States she is resting now and she is doing the cbd gummies with Melatonin)    Subjective          Amber Poole presents to [facility]  History of Present Illness  She is taking the trazodone, tylenol arthritis and she is doing the CBD--this has 5mg and melatonin (3mg). She is sleeping better.  She is getting about 11p-530am of good sleep and then she will doze and then wake up.  She want to keep her Appointment today.    She is doing therapy at [facility] in [facility] for the travon knee replacement.      Allergies  Codeine and Penicillins    Social History     Tobacco Use    Smoking status: Never    Smokeless tobacco: Never   Vaping Use    Vaping Use: Never used   Substance Use Topics    Alcohol use: Never    Drug use: Never       Family History   Problem Relation Age of Onset    Diabetes Mother     Kidney nephrosis Mother     Heart disease Father     Vision loss Father     Macular degeneration Father     Kidney nephrosis Maternal Grandmother     Other Maternal Grandmother         BLADDER CALCULUS    Liver cancer Other         UNSPECIFIED    Lung cancer Other         UNSPECIFIED    Cancer Daughter     Leukemia Daughter     Leukemia Son     Macular degeneration Sister         Health Maintenance Due   Topic Date Due    COVID-19 Vaccine (1) Never done    ZOSTER VACCINE (1 of 2) Never done    ANNUAL PHYSICAL  Never done        Immunization History   Administered Date(s) Administered    Influenza, Unspecified 10/01/2020    Tdap 08/06/2018       Review of Systems   Constitutional:  Positive for fatigue.   Musculoskeletal:  Positive for gait problem and joint swelling.      Objective       Vitals:    06/19/23 1550   BP: 136/87   Pulse: 86   Resp: 16   Temp: 97.7 °F (36.5 °C)   SpO2: 98%   Weight: 82.6 kg (182 lb)   Height: 170.2 cm (67\")       Body mass index is 28.51 kg/m².         Physical Exam  Vitals reviewed.   Constitutional:       Appearance: Normal " appearance. She is well-developed.   Cardiovascular:      Rate and Rhythm: Normal rate and regular rhythm.      Heart sounds: Normal heart sounds. No murmur heard.  Pulmonary:      Effort: Pulmonary effort is normal.      Breath sounds: Normal breath sounds.   Neurological:      Mental Status: She is alert and oriented to person, place, and time.      Cranial Nerves: No cranial nerve deficit.      Motor: No weakness.   Psychiatric:         Mood and Affect: Mood and affect normal.   The inc are normal howard with steri strips.         Result Review :     The following data was reviewed by: SHAILESH Colvin on 06/19/2023:                     Assessment and Plan      Diagnoses and all orders for this visit:    1. Primary insomnia (Primary)    Howard knee replacement         Follow Up     Return if symptoms worsen or fail to improve.  She can take tylenol am and the afternoon then 2 at night.  She can take a hydrocodone if needed but hold the arthritis tylenol.  She is taking the celebrex twice a day.  Cont on the CBD oil/gummies and no more than 10mg melatonin total at a night.  Cont the trazodone.    Patient was given instructions and counseling regarding her condition or for health maintenance advice. Please see specific information pulled into the AVS if appropriate.     Parts of this note are electronic transcriptions/translations of spoken language to printed text using the Dragon Dictation system.          SHAILESH Colvin  06/19/2023Answers submitted by the patient for this visit:  Other (Submitted on 6/15/2023)  Please describe your symptoms.: Can’t go to sleep post bilateral knee replacement  Have you had these symptoms before?: No  How long have you been having these symptoms?: 1-2 weeks  Please list any medications you are currently taking for this condition.: Trazadone 100 mg, CBD gummy’s 25mg w/ 5 mg melatonin plus 3 extra mg melatonin  Please describe any probable cause for these symptoms. :  Restlessness  Primary Reason for Visit (Submitted on 6/15/2023)  What is the primary reason for your visit?: Other

## 2023-08-09 ENCOUNTER — HOSPITAL ENCOUNTER (OUTPATIENT)
Dept: MAMMOGRAPHY | Facility: HOSPITAL | Age: 58
Discharge: HOME | End: 2023-08-09
Admitting: OBSTETRICS & GYNECOLOGY
Payer: COMMERCIAL

## 2023-08-09 DIAGNOSIS — Z12.31 SCREENING MAMMOGRAM FOR BREAST CANCER: ICD-10-CM

## 2023-08-09 PROCEDURE — 77067 SCR MAMMO BI INCL CAD: CPT

## 2023-08-09 PROCEDURE — 77063 BREAST TOMOSYNTHESIS BI: CPT

## 2023-08-14 ENCOUNTER — TELEPHONE (OUTPATIENT)
Dept: ORTHOPEDIC SURGERY | Facility: CLINIC | Age: 58
End: 2023-08-14
Payer: COMMERCIAL

## 2023-08-14 NOTE — TELEPHONE ENCOUNTER
PROVIDER OUT OF OFFICE AFTER 2:00 ON 08/18/23. LVM FOR PT TO CALL BACK AND RESCHEDULE. OK FOR HUB TO SCHEDULE.

## 2023-08-15 ENCOUNTER — TELEPHONE (OUTPATIENT)
Dept: ORTHOPEDIC SURGERY | Facility: CLINIC | Age: 58
End: 2023-08-15

## 2023-08-15 DIAGNOSIS — Z96.653 AFTERCARE FOLLOWING BILATERAL KNEE JOINT REPLACEMENT SURGERY: Primary | ICD-10-CM

## 2023-08-15 DIAGNOSIS — Z96.653 S/P TOTAL KNEE ARTHROPLASTY, BILATERAL: Primary | ICD-10-CM

## 2023-08-15 DIAGNOSIS — Z47.1 AFTERCARE FOLLOWING BILATERAL KNEE JOINT REPLACEMENT SURGERY: Primary | ICD-10-CM

## 2023-08-21 ENCOUNTER — OFFICE VISIT (OUTPATIENT)
Dept: ORTHOPEDIC SURGERY | Facility: CLINIC | Age: 58
End: 2023-08-21
Payer: COMMERCIAL

## 2023-08-21 VITALS — WEIGHT: 182 LBS | BODY MASS INDEX: 28.56 KG/M2 | HEIGHT: 67 IN

## 2023-08-21 DIAGNOSIS — Z96.653 S/P TOTAL KNEE ARTHROPLASTY, BILATERAL: Primary | ICD-10-CM

## 2023-08-21 PROCEDURE — 99024 POSTOP FOLLOW-UP VISIT: CPT

## 2023-08-21 RX ORDER — CLINDAMYCIN HYDROCHLORIDE 300 MG/1
600 CAPSULE ORAL ONCE
Qty: 2 CAPSULE | Refills: 1 | Status: SHIPPED | OUTPATIENT
Start: 2023-08-21 | End: 2023-08-21

## 2023-08-21 NOTE — PATIENT INSTRUCTIONS
X-rays taken and reviewed with patient.  Hardware is intact.       Patient is doing well.  Encouraged to continue home exercises for ROM and strengthening. May continue icing as needed for no more than 20 minutes at a time for comfort and inflammation.     May apply Vitamin E, cocoa butter, etc. over incision to aid in scar appearance.     Encouraged to continue avoiding kneeling directly on prosthetic and avoid deep squatting.    Educated about needing dental prophylaxis for life.  Pt is to call our office or the dentist to receive an order for antibiotics prior to dental procedure.    Educated that numbness can improve for up to 1 year, however at the year regla if still experiencing numbness it may not return.    Order placed to continue physical therapy.    Follow-up in 6 weeks to evaluate progress with therapy.  No x-rays needed.     Follow-up in 9 months for updated x-rays at the 1 year anniversary. Call with questions or concerns.

## 2023-08-21 NOTE — PROGRESS NOTES
"Chief Complaint  Pain and Follow-up of the Left Knee and Follow-up and Pain of the Right Knee    Subjective      Amber Poole presents to [facility] for 3-month follow-up of bilateral total knee arthroplasty with Cassidy wilson with Dr. Swartz on 5/26/2023.  Patient is ambulatory without use of assistive devices.    Patient wishes to continue physical therapy.    Patient reports that she has been using the Dynasplint for the right knee up until the last 2 weeks.  There has been a new physical therapist that she has been working with that has been using different maneuvers to get the knee straightened and she is noted a lot of improvement in the last 2 weeks.  Patient is needing dental prophylaxis.    Objective   Allergies   Allergen Reactions    Codeine Anaphylaxis and Rash    Penicillins Rash       Vital Signs:   Ht 170.2 cm (67\")   Wt 82.6 kg (182 lb)   BMI 28.51 kg/mý       Physical Exam    Constitutional: Awake, alert. Well nourished appearance.    Integumentary: Warm, dry, intact. No obvious rashes.    HENT: Atraumatic, normocephalic.   Respiratory: Non labored respirations .   Cardiovascular: Intact peripheral pulses.    Psychiatric: Normal mood and affect. A&O X3    Ortho Exam  Bilateral knees: Incisions are completely healed and well approximated.  There is no redness, drainage or tenderness to the touch.  No edema noted.  Stable to varus and valgus stress.  Negative anterior and posterior drawer test.  Sensation is intact throughout.  Left knee range of motion 0 to 136 degrees.  Right knee range of motion -5 to 132 degrees.    Imaging Results (Most Recent)       Procedure Component Value Units Date/Time    XR Knee 3 View Left [[id number]] Resulted: 08/21/23 1411     Updated: 08/21/23 1411    Narrative:      X-Ray Report:  Study: X-rays ordered, taken in the office, and reviewed today.   Site: Left knee xray  Indication: Left TKA  View: AP/Lateral, Standing, and Sunrise " view(s)  Findings: Intact left total knee arthroplasty. No evidence of hardware   malfunction or periprosthetic fractures.  Patella centrally tracking.   Prior studies available for comparison: yes     XR Knee 3 View Right [[id number]] Resulted: 08/21/23 1410     Updated: 08/21/23 1411    Narrative:      X-Ray Report:  Study: X-rays ordered, taken in the office, and reviewed today.   Site: Right knee xray  Indication: Right TKA  View: AP/Lateral, Standing, and [place] view(s)  Findings: Intact right total knee arthroplasty. No evidence of hardware   malfunction or periprosthetic fractures.  Patella centrally tracking.   Prior studies available for comparison: yes                       Assessment and Plan   Problem List Items Addressed This Visit    None  Visit Diagnoses       S/P total knee arthroplasty, bilateral    -  Primary    Relevant Orders    XR Knee 3 View Right (Completed)    XR Knee 3 View Left (Completed)    Ambulatory Referral to Physical Therapy Evaluate and treat, POST OP; Stretching, Strengthening, ROM (Completed)            Follow Up   No follow-ups on file.    Patient is a non-smoker, did not discuss options for smoking cessation.     Social History     Socioeconomic History    Marital status:    Tobacco Use    Smoking status: Never    Smokeless tobacco: Never   Vaping Use    Vaping Use: Never used   Substance and Sexual Activity    Alcohol use: Never    Drug use: Never    Sexual activity: Defer       Patient Instructions   X-rays taken and reviewed with patient.  Hardware is intact.       Patient is doing well.  Encouraged to continue home exercises for ROM and strengthening. May continue icing as needed for no more than 20 minutes at a time for comfort and inflammation.     May apply Vitamin E, cocoa butter, etc. over incision to aid in scar appearance.     Encouraged to continue avoiding kneeling directly on prosthetic and avoid deep squatting.    Educated about needing dental prophylaxis  for life.  Pt is to call our office or the dentist to receive an order for antibiotics prior to dental procedure.    Educated that numbness can improve for up to 1 year, however at the year regla if still experiencing numbness it may not return.    Order placed to continue physical therapy.    Follow-up in 6 weeks to evaluate progress with therapy.  No x-rays needed.     Follow-up in 9 months for updated x-rays at the 1 year anniversary. Call with questions or concerns.   Patient was given instructions and counseling regarding her condition or for health maintenance advice. Please see specific information pulled into the AVS if appropriate.

## 2023-08-24 ASSESSMENT — KOOS JR
KOOS JR SCORE: 68.284
KOOS JR SCORE: 7

## 2023-08-30 ENCOUNTER — OFFICE VISIT (OUTPATIENT)
Dept: ORTHOPEDIC SURGERY | Facility: CLINIC | Age: 58
End: 2023-08-30
Payer: COMMERCIAL

## 2023-08-30 VITALS
WEIGHT: 182 LBS | SYSTOLIC BLOOD PRESSURE: 132 MMHG | OXYGEN SATURATION: 97 % | HEIGHT: 67 IN | HEART RATE: 79 BPM | DIASTOLIC BLOOD PRESSURE: 88 MMHG | BODY MASS INDEX: 28.56 KG/M2

## 2023-08-30 DIAGNOSIS — M25.512 LEFT SHOULDER PAIN, UNSPECIFIED CHRONICITY: Primary | ICD-10-CM

## 2023-08-30 PROCEDURE — 99203 OFFICE O/P NEW LOW 30 MIN: CPT | Performed by: ORTHOPAEDIC SURGERY

## 2023-08-30 RX ORDER — AMANTADINE HYDROCHLORIDE 100 MG/1
TABLET ORAL
COMMUNITY
Start: 2023-08-23

## 2023-08-30 NOTE — PROGRESS NOTES
"Chief Complaint  Initial Evaluation and Pain of the Left Shoulder     Subjective      Amber Poole presents to [facility] for initial evaluation of the left shoulder. She has had pain in the shoulder for awhile.  She has had pain for over a year.  She has pain in the shoulder and down her arm.  She has difficulty with forward and upward ROM.  She states the pain affects her sleeping.  She has had no recent injury or fall. 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     Allergies   Allergen Reactions    Codeine Anaphylaxis and Rash    Penicillins Rash        Social History     Socioeconomic History    Marital status:    Tobacco Use    Smoking status: Never    Smokeless tobacco: Never   Vaping Use    Vaping Use: Never used   Substance and Sexual Activity    Alcohol use: Never    Drug use: Never    Sexual activity: Defer        I reviewed the patient's chief complaint, history of present illness, review of systems, past medical history, surgical history, family history, social history, medications, and allergy list.     Review of Systems     Constitutional: Denies fevers, chills, weight loss  Cardiovascular: Denies chest pain, shortness of breath  Skin: Denies rashes, acute skin changes  Neurologic: Denies headache, loss of consciousness        Vital Signs:   /88   Pulse 79   Ht 170.2 cm (67\")   Wt 82.6 kg (182 lb)   SpO2 97%   BMI 28.51 kg/mý          Physical Exam  General: Alert. No acute distress    Ortho Exam        LEFT SHOULDER Forward flexion 170. Abduction 120. External rotation 50. Internal rotation SI joint. Positive Cross body adduction. Supraspinatus strength 4/5. Infraspinatus Strength 4/5. Infrared subscap 4/5. Positive Palacios. Positive Neer. Negative Apprehension. Negative Lift off. (Negative Obriens. Sensation intact to light touch, median, radial, ulnar nerve. Positive AIN, PIN, ulnar nerve motor. " Positive pulses. Positive Impingement signs. Good strength in triceps, biceps, deltoid, wrist extensors and wrist flexors.        Procedures        Imaging Results (Most Recent)       Procedure Component Value Units Date/Time    XR Scapula Left [[id number]] Resulted: 08/30/23 0944     Updated: 08/30/23 0944             Result Review :       X-Ray Report:  Left scapula X-Ray  Indication: Evaluation of the left scapula  AP/Lateral view(s)  Findings: No arthritis.  No fracture or dislocation.    Prior studies available for comparison: yes              Assessment and Plan     Diagnoses and all orders for this visit:    1. Left shoulder pain, unspecified chronicity (Primary)  -     XR Scapula Left        Discussed the treatment plan with the patient. I reviewed the X-rays that were obtained today with the patient.     MRI of the left shoulder.         Call or return if worsening symptoms.    Follow Up     After MRI of the left shoulder.       Patient was given instructions and counseling regarding her condition or for health maintenance advice. Please see specific information pulled into the AVS if appropriate.     Scribed for Carl Swartz MD by Leonila Sol MA.  08/30/23   09:51 EDT    I have personally performed the services described in this document as scribed by the above individual and it is both accurate and complete. Carl Swartz MD 08/30/23

## 2023-08-31 DIAGNOSIS — R23.2 HOT FLASHES: ICD-10-CM

## 2023-08-31 DIAGNOSIS — J30.89 SEASONAL ALLERGIC RHINITIS DUE TO OTHER ALLERGIC TRIGGER: ICD-10-CM

## 2023-08-31 DIAGNOSIS — R19.8 STRAINING WITH STOOLS: ICD-10-CM

## 2023-08-31 DIAGNOSIS — M19.90 UNSPECIFIED OSTEOARTHRITIS, UNSPECIFIED SITE: ICD-10-CM

## 2023-08-31 RX ORDER — DOCUSATE SODIUM 100 MG/1
CAPSULE, LIQUID FILLED ORAL
Qty: 90 CAPSULE | Refills: 1 | OUTPATIENT
Start: 2023-08-31

## 2023-08-31 RX ORDER — ATORVASTATIN CALCIUM 20 MG/1
TABLET, FILM COATED ORAL
Qty: 90 TABLET | Refills: 1 | OUTPATIENT
Start: 2023-08-31

## 2023-08-31 RX ORDER — CELECOXIB 100 MG/1
CAPSULE ORAL
Qty: 180 CAPSULE | Refills: 1 | OUTPATIENT
Start: 2023-08-31

## 2023-08-31 RX ORDER — ESCITALOPRAM OXALATE 10 MG/1
TABLET ORAL
Qty: 30 TABLET | Refills: 1 | OUTPATIENT
Start: 2023-08-31

## 2023-08-31 RX ORDER — LORATADINE 10 MG/1
TABLET ORAL
Qty: 90 TABLET | Refills: 1 | OUTPATIENT
Start: 2023-08-31

## 2023-09-07 ENCOUNTER — OFFICE VISIT (OUTPATIENT)
Dept: FAMILY MEDICINE CLINIC | Facility: CLINIC | Age: 58
End: 2023-09-07
Payer: COMMERCIAL

## 2023-09-07 VITALS
RESPIRATION RATE: 16 BRPM | HEIGHT: 67 IN | OXYGEN SATURATION: 98 % | HEART RATE: 71 BPM | WEIGHT: 183.4 LBS | SYSTOLIC BLOOD PRESSURE: 136 MMHG | BODY MASS INDEX: 28.79 KG/M2 | DIASTOLIC BLOOD PRESSURE: 84 MMHG | TEMPERATURE: 97.6 F

## 2023-09-07 DIAGNOSIS — R05.9 COUGH: ICD-10-CM

## 2023-09-07 DIAGNOSIS — E78.2 MIXED HYPERLIPIDEMIA: Primary | ICD-10-CM

## 2023-09-07 DIAGNOSIS — R23.2 HOT FLASHES: ICD-10-CM

## 2023-09-07 DIAGNOSIS — E03.9 ACQUIRED HYPOTHYROIDISM: ICD-10-CM

## 2023-09-07 DIAGNOSIS — R19.8 STRAINING WITH STOOLS: ICD-10-CM

## 2023-09-07 DIAGNOSIS — J30.2 SEASONAL ALLERGIC RHINITIS, UNSPECIFIED TRIGGER: ICD-10-CM

## 2023-09-07 DIAGNOSIS — J30.89 SEASONAL ALLERGIC RHINITIS DUE TO OTHER ALLERGIC TRIGGER: ICD-10-CM

## 2023-09-07 LAB
ALBUMIN SERPL-MCNC: 4.6 G/DL (ref 3.5–5.2)
ALBUMIN/GLOB SERPL: 1.9 G/DL
ALP SERPL-CCNC: 85 U/L (ref 39–117)
ALT SERPL W P-5'-P-CCNC: 11 U/L (ref 1–33)
ANION GAP SERPL CALCULATED.3IONS-SCNC: 10 MMOL/L (ref 5–15)
AST SERPL-CCNC: 19 U/L (ref 1–32)
BASOPHILS # BLD AUTO: 0.04 10*3/MM3 (ref 0–0.2)
BASOPHILS NFR BLD AUTO: 0.7 % (ref 0–1.5)
BILIRUB SERPL-MCNC: 0.2 MG/DL (ref 0–1.2)
BUN SERPL-MCNC: 16 MG/DL (ref 6–20)
BUN/CREAT SERPL: 23.2 (ref 7–25)
CALCIUM SPEC-SCNC: 9.9 MG/DL (ref 8.6–10.5)
CHLORIDE SERPL-SCNC: 108 MMOL/L (ref 98–107)
CHOLEST SERPL-MCNC: 173 MG/DL (ref 0–200)
CO2 SERPL-SCNC: 25 MMOL/L (ref 22–29)
CREAT SERPL-MCNC: 0.69 MG/DL (ref 0.57–1)
DEPRECATED RDW RBC AUTO: 39.8 FL (ref 37–54)
EGFRCR SERPLBLD CKD-EPI 2021: 102.6 ML/MIN/1.73
EOSINOPHIL # BLD AUTO: 0.18 10*3/MM3 (ref 0–0.4)
EOSINOPHIL NFR BLD AUTO: 3 % (ref 0.3–6.2)
ERYTHROCYTE [DISTWIDTH] IN BLOOD BY AUTOMATED COUNT: 12.7 % (ref 12.3–15.4)
GLOBULIN UR ELPH-MCNC: 2.4 GM/DL
GLUCOSE SERPL-MCNC: 93 MG/DL (ref 65–99)
HCT VFR BLD AUTO: 42.3 % (ref 34–46.6)
HDLC SERPL-MCNC: 47 MG/DL (ref 40–60)
HGB BLD-MCNC: 13.7 G/DL (ref 12–15.9)
IMM GRANULOCYTES # BLD AUTO: 0.03 10*3/MM3 (ref 0–0.05)
IMM GRANULOCYTES NFR BLD AUTO: 0.5 % (ref 0–0.5)
LDLC SERPL CALC-MCNC: 103 MG/DL (ref 0–100)
LDLC/HDLC SERPL: 2.13 {RATIO}
LYMPHOCYTES # BLD AUTO: 1.52 10*3/MM3 (ref 0.7–3.1)
LYMPHOCYTES NFR BLD AUTO: 25.2 % (ref 19.6–45.3)
MCH RBC QN AUTO: 28 PG (ref 26.6–33)
MCHC RBC AUTO-ENTMCNC: 32.4 G/DL (ref 31.5–35.7)
MCV RBC AUTO: 86.5 FL (ref 79–97)
MONOCYTES # BLD AUTO: 0.41 10*3/MM3 (ref 0.1–0.9)
MONOCYTES NFR BLD AUTO: 6.8 % (ref 5–12)
NEUTROPHILS NFR BLD AUTO: 3.86 10*3/MM3 (ref 1.7–7)
NEUTROPHILS NFR BLD AUTO: 63.8 % (ref 42.7–76)
NRBC BLD AUTO-RTO: 0 /100 WBC (ref 0–0.2)
PLATELET # BLD AUTO: 319 10*3/MM3 (ref 140–450)
PMV BLD AUTO: 11.2 FL (ref 6–12)
POTASSIUM SERPL-SCNC: 5 MMOL/L (ref 3.5–5.2)
PROT SERPL-MCNC: 7 G/DL (ref 6–8.5)
RBC # BLD AUTO: 4.89 10*6/MM3 (ref 3.77–5.28)
SODIUM SERPL-SCNC: 143 MMOL/L (ref 136–145)
TRIGL SERPL-MCNC: 129 MG/DL (ref 0–150)
TSH SERPL DL<=0.05 MIU/L-ACNC: 0.83 UIU/ML (ref 0.27–4.2)
VLDLC SERPL-MCNC: 23 MG/DL (ref 5–40)
WBC NRBC COR # BLD: 6.04 10*3/MM3 (ref 3.4–10.8)

## 2023-09-07 PROCEDURE — 80050 GENERAL HEALTH PANEL: CPT | Performed by: NURSE PRACTITIONER

## 2023-09-07 PROCEDURE — 80061 LIPID PANEL: CPT | Performed by: NURSE PRACTITIONER

## 2023-09-07 RX ORDER — ATORVASTATIN CALCIUM 20 MG/1
20 TABLET, FILM COATED ORAL DAILY
Qty: 90 TABLET | Refills: 1 | Status: SHIPPED | OUTPATIENT
Start: 2023-09-07

## 2023-09-07 RX ORDER — DOCUSATE CALCIUM 240 MG
240 CAPSULE ORAL DAILY
Qty: 90 CAPSULE | Refills: 1 | Status: SHIPPED | OUTPATIENT
Start: 2023-09-07

## 2023-09-07 RX ORDER — LORATADINE 10 MG/1
10 TABLET ORAL DAILY
Qty: 90 TABLET | Refills: 1 | Status: SHIPPED | OUTPATIENT
Start: 2023-09-07

## 2023-09-07 RX ORDER — ESCITALOPRAM OXALATE 10 MG/1
10 TABLET ORAL DAILY
Qty: 90 TABLET | Refills: 1 | Status: SHIPPED | OUTPATIENT
Start: 2023-09-07

## 2023-09-07 RX ORDER — DOCUSATE SODIUM 100 MG/1
100 CAPSULE, LIQUID FILLED ORAL 2 TIMES DAILY
Qty: 180 CAPSULE | Refills: 1 | Status: SHIPPED | OUTPATIENT
Start: 2023-09-07

## 2023-09-07 RX ORDER — MONTELUKAST SODIUM 10 MG/1
10 TABLET ORAL
Qty: 90 TABLET | Refills: 1 | Status: SHIPPED | OUTPATIENT
Start: 2023-09-07

## 2023-09-07 NOTE — PROGRESS NOTES
Chief Complaint  Hyperlipidemia, Depression, Hypothyroidism, and Hypertension    Subjective          Amber Poole presents to [facility]  History of Present Illness  Anemia:   Her iron was stable with her last set of labs.  She does take her Vit D.  She is doing her colace daily but is feeling like she may need to increase on the colace.  .  Hot Flashes:  The lexapro is doing good and she is still doing the hormones.     Allergies:  She is taking her claritin and singular.  Arthritis:  She is doing good overall.     Insomnia: She is doing well with her trazodone.  She takes 1/2-1 and is sleeping better.  She has been doing her walking in the office.  She kept the wt off.  She is doing ok but has changed diet.    Thyroid:  managed by endo  She has been doing good with therapy.    Depression: Not at risk    PHQ-2 Score: 0    and 3/7/2023    BMI is >= 25 and <30. (Overweight) The following options were offered after discussion;: exercise counseling/recommendations and nutrition counseling/recommendations         Allergies  Codeine and Penicillins    Social History     Tobacco Use    Smoking status: Never    Smokeless tobacco: Never   Vaping Use    Vaping Use: Never used   Substance Use Topics    Alcohol use: Never    Drug use: Never       Family History   Problem Relation Age of Onset    Diabetes Mother     Kidney nephrosis Mother     Heart disease Father     Vision loss Father     Macular degeneration Father     Kidney nephrosis Maternal Grandmother     Other Maternal Grandmother         BLADDER CALCULUS    Liver cancer Other         UNSPECIFIED    Lung cancer Other         UNSPECIFIED    Cancer Daughter     Leukemia Daughter     Leukemia Son     Macular degeneration Sister         Health Maintenance Due   Topic Date Due    ZOSTER VACCINE (1 of 2) Never done    ANNUAL PHYSICAL  Never done        Immunization History   Administered Date(s) Administered    Influenza, Unspecified  "10/01/2020    Tdap 08/06/2018       Review of Systems   Constitutional:  Negative for fatigue.   Respiratory:  Negative for cough and shortness of breath.    Cardiovascular:  Negative for chest pain.   Gastrointestinal:  Positive for constipation. Negative for diarrhea, nausea and vomiting.      Objective       Vitals:    09/07/23 0908 09/07/23 0940   BP: 134/97 136/84   Pulse: 71    Resp: 16    Temp: 97.6 °F (36.4 °C)    SpO2: 98%    Weight: 83.2 kg (183 lb 6.4 oz)    Height: 170.2 cm (67\")        Body mass index is 28.72 kg/m².         Physical Exam  Vitals reviewed.   Constitutional:       Appearance: Normal appearance. She is well-developed.   Cardiovascular:      Rate and Rhythm: Normal rate and regular rhythm.      Heart sounds: Normal heart sounds. No murmur heard.  Pulmonary:      Effort: Pulmonary effort is normal.      Breath sounds: Normal breath sounds. No wheezing or rhonchi.   Neurological:      Mental Status: She is alert and oriented to person, place, and time.      Cranial Nerves: No cranial nerve deficit.      Motor: No weakness.   Psychiatric:         Mood and Affect: Mood and affect normal.           Result Review :     The following data was reviewed by: SHAILESH Colvin on 09/07/2023:    Common Labs   Common labs          5/26/2023    22:47 5/27/2023    04:28 9/7/2023    09:47   Common Labs   Glucose  114  93    BUN  11  16    Creatinine  0.70  0.69    Sodium  137  143    Potassium  3.7  5.0    Chloride  105  108    Calcium  8.6  9.9    Albumin  3.3  4.6    Total Bilirubin   0.2    Alkaline Phosphatase   85    AST (SGOT)   19    ALT (SGPT)   11    WBC 12.79   6.04    Hemoglobin 11.0   13.7    Hematocrit 33.5   42.3    Platelets 248   319    Total Cholesterol   173    Triglycerides   129    HDL Cholesterol   47    LDL Cholesterol    103                     Assessment and Plan      Diagnoses and all orders for this visit:    1. Mixed hyperlipidemia (Primary)  -     Comprehensive " Metabolic Panel  -     CBC & Differential  -     TSH  -     Lipid Panel  -     atorvastatin (Lipitor) 20 MG tablet; Take 1 tablet by mouth Daily.  Dispense: 90 tablet; Refill: 1    2. Cough  -     montelukast (SINGULAIR) 10 MG tablet; Take 1 tablet by mouth every night at bedtime.  Dispense: 90 tablet; Refill: 1    3. Seasonal allergic rhinitis, unspecified trigger  -     montelukast (SINGULAIR) 10 MG tablet; Take 1 tablet by mouth every night at bedtime.  Dispense: 90 tablet; Refill: 1    4. Seasonal allergic rhinitis due to other allergic trigger  -     loratadine (CLARITIN) 10 MG tablet; Take 1 tablet by mouth Daily.  Dispense: 90 tablet; Refill: 1    5. Straining with stools  -     docusate calcium (GNP Stool Softener) 240 MG capsule; Take 1 capsule by mouth Daily.  Dispense: 90 capsule; Refill: 1  -     docusate sodium (COLACE) 100 MG capsule; Take 1 capsule by mouth 2 (Two) Times a Day.  Dispense: 180 capsule; Refill: 1    6. Hot flashes  -     escitalopram (Lexapro) 10 MG tablet; Take 1 tablet by mouth Daily.  Dispense: 90 tablet; Refill: 1    7. Acquired hypothyroidism            Follow Up     Return in about 6 months (around 3/7/2024).  Follow-up in 6 months for labs and appt. Call with any concerns or questions that you may have regarding your medications or history.    I have reviewed all medications and at this time no medications changes need to be adjusted for all chronic conditions.  She will increase the colace in the am and keep me posted.  Cont the great work with wt/knees.      Patient was given instructions and counseling regarding her condition or for health maintenance advice. Please see specific information pulled into the AVS if appropriate.     Parts of this note are electronic transcriptions/translations of spoken language to printed text using the Dragon Dictation system.          Faith Ramos, SHAILESH  09/07/2023Answers submitted by the patient for this visit:  Other (Submitted on  8/31/2023)  Please describe your symptoms.: Labs and refills  Have you had these symptoms before?: No  How long have you been having these symptoms?: 1-4 days  Primary Reason for Visit (Submitted on 8/31/2023)  What is the primary reason for your visit?: Other

## 2023-09-08 DIAGNOSIS — M19.90 UNSPECIFIED OSTEOARTHRITIS, UNSPECIFIED SITE: ICD-10-CM

## 2023-09-08 RX ORDER — CELECOXIB 100 MG/1
CAPSULE ORAL
Qty: 180 CAPSULE | Refills: 1 | OUTPATIENT
Start: 2023-09-08

## 2023-09-13 ENCOUNTER — TELEPHONE (OUTPATIENT)
Dept: FAMILY MEDICINE CLINIC | Facility: CLINIC | Age: 58
End: 2023-09-13
Payer: COMMERCIAL

## 2023-09-13 ENCOUNTER — HOSPITAL ENCOUNTER (OUTPATIENT)
Dept: MRI IMAGING | Facility: HOSPITAL | Age: 58
Discharge: HOME | End: 2023-09-13
Admitting: ORTHOPAEDIC SURGERY
Payer: COMMERCIAL

## 2023-09-13 DIAGNOSIS — M25.512 LEFT SHOULDER PAIN, UNSPECIFIED CHRONICITY: ICD-10-CM

## 2023-09-13 PROCEDURE — 73221 MRI JOINT UPR EXTREM W/O DYE: CPT

## 2023-09-13 NOTE — TELEPHONE ENCOUNTER
[facility] requesting refill celebrex 100mg . Patient is out of medication

## 2023-09-14 ENCOUNTER — TELEPHONE (OUTPATIENT)
Dept: FAMILY MEDICINE CLINIC | Facility: CLINIC | Age: 58
End: 2023-09-14
Payer: COMMERCIAL

## 2023-09-14 RX ORDER — CELECOXIB 100 MG/1
100 CAPSULE ORAL EVERY 12 HOURS SCHEDULED
Qty: 60 CAPSULE | Refills: 2 | Status: SHIPPED | OUTPATIENT
Start: 2023-09-14

## 2023-09-14 NOTE — TELEPHONE ENCOUNTER
Patient states she talked to ortho and they said it is ok to take the celebrex with the compound cream. States she talked with Zamzam at Ortho

## 2023-09-18 ENCOUNTER — OFFICE VISIT (OUTPATIENT)
Dept: ORTHOPEDIC SURGERY | Facility: CLINIC | Age: 58
End: 2023-09-18
Payer: COMMERCIAL

## 2023-09-18 VITALS
HEIGHT: 67 IN | SYSTOLIC BLOOD PRESSURE: 138 MMHG | HEART RATE: 77 BPM | DIASTOLIC BLOOD PRESSURE: 85 MMHG | OXYGEN SATURATION: 98 % | WEIGHT: 185.8 LBS | BODY MASS INDEX: 29.16 KG/M2

## 2023-09-18 DIAGNOSIS — M75.112 NONTRAUMATIC INCOMPLETE TEAR OF LEFT ROTATOR CUFF: ICD-10-CM

## 2023-09-18 DIAGNOSIS — M25.512 LEFT SHOULDER PAIN, UNSPECIFIED CHRONICITY: Primary | ICD-10-CM

## 2023-09-18 RX ADMIN — LIDOCAINE HYDROCHLORIDE 5 ML: 10 INJECTION, SOLUTION INFILTRATION; PERINEURAL at 10:57

## 2023-09-18 RX ADMIN — TRIAMCINOLONE ACETONIDE 40 MG: 40 INJECTION, SUSPENSION INTRA-ARTICULAR; INTRAMUSCULAR at 10:57

## 2023-09-18 NOTE — PROGRESS NOTES
"Chief Complaint  Follow-up of the Left Shoulder     Subjective      Amber Poole presents to [facility] for follow up of the left shoulder. She had a MRI on 9/13/23.   She has had pain in the shoulder for awhile.  She has had pain for over a year.  She has pain in the shoulder and down her arm.  She has difficulty with forward and upward ROM.  She states the pain affects her sleeping.  She has had no recent injury or fall. 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     Allergies   Allergen Reactions    Codeine Anaphylaxis and Rash    Penicillins Rash        Social History     Socioeconomic History    Marital status:    Tobacco Use    Smoking status: Never    Smokeless tobacco: Never   Vaping Use    Vaping Use: Never used   Substance and Sexual Activity    Alcohol use: Never    Drug use: Never    Sexual activity: Defer        I reviewed the patient's chief complaint, history of present illness, review of systems, past medical history, surgical history, family history, social history, medications, and allergy list.     Review of Systems     Constitutional: Denies fevers, chills, weight loss  Cardiovascular: Denies chest pain, shortness of breath  Skin: Denies rashes, acute skin changes  Neurologic: Denies headache, loss of consciousness  M      Vital Signs:   /85   Pulse 77   Ht 170.2 cm (67\")   Wt 84.3 kg (185 lb 12.8 oz)   SpO2 98%   BMI 29.10 kg/m²          Physical Exam  General: Alert. No acute distress    Ortho Exam        LEFT SHOULDER Forward flexion 170. Abduction 120. External rotation 50. Internal rotation SI joint. Positive Cross body adduction. Supraspinatus strength 4/5. Infraspinatus Strength 4/5. Infrared subscap 4/5. Positive Palacios. Positive Neer. Negative Apprehension. Negative Lift off. (Negative Obriens. Sensation intact to light touch, median, radial, ulnar nerve. Positive AIN, PIN, ulnar nerve " motor. Positive pulses. Positive Impingement signs. Good strength in triceps, biceps, deltoid, wrist extensors and wrist flexors.       Large Joint Arthrocentesis: L subacromial bursa  Date/Time: 9/18/2023 10:57 AM  Consent given by: patient  Site marked: site marked  Timeout: Immediately prior to procedure a time out was called to verify the correct patient, procedure, equipment, support staff and site/side marked as required   Supporting Documentation  Indications: pain   Procedure Details  Location: shoulder - L subacromial bursa  Preparation: Patient was prepped and draped in the usual sterile fashion  Needle size: 22 G  Medications administered: 5 mL lidocaine 1 %; 40 mg triamcinolone acetonide 40 MG/ML  Patient tolerance: patient tolerated the procedure well with no immediate complications          Imaging Results (Most Recent)       None             Result Review          XR Scapula Left    Result Date: 8/30/2023  Narrative: X-Ray Report: Left scapula X-Ray Indication: Evaluation of the left scapula AP/Lateral view(s) Findings: No arthritis.  No fracture or dislocation.  Prior studies available for comparison: yes     XR Knee 3 View Left    Result Date: 8/23/2023  Narrative: X-Ray Report: Study: X-rays ordered, taken in the office, and reviewed today. Site: Left knee xray Indication: Left TKA View: AP/Lateral, Standing, and Cow[place] view(s) Findings: Intact left total knee arthroplasty. No evidence of hardware malfunction or periprosthetic fractures.  Patella centrally tracking. Prior studies available for comparison: yes     XR Knee 3 View Right    Result Date: 8/23/2023  Narrative: X-Ray Report: Study: X-rays ordered, taken in the office, and reviewed today. Site: Right knee xray Indication: Right TKA View: AP/Lateral, Standing, and [place] view(s) Findings: Intact right total knee arthroplasty. No evidence of hardware malfunction or periprosthetic fractures.  Patella centrally tracking. Prior studies  available for comparison: yes     MRI Shoulder Left Without Contrast    Result Date: 9/14/2023  Narrative: LEFT SHOULDER MRI  HISTORY: Shoulder pain with decreased range of motion.  TECHNIQUE: Left shoulder MRI was performed in standard fashion.  FINDINGS: Detail is somewhat compromised by extensive motion on every sequence.  Marrow signal is normal. The acromioclavicular joint appears normal.  A 5 mm wide focus of fluid signal within the interstitium of the anterior distal supraspinatus tendon represents a near full-thickness tendon tear. There are thin bundle of intact fibers along both the articular and bursal surfaces in this area. There is also mild edema of the tendon posterior to this defect.  The other rotator cuff tendons appear normal. Rotator cuff and other shoulder musculature appears normal.  The long head biceps tendon is intact.  Abnormal edema and thickening is observed along the glenoid labrum superiorly and posterosuperiorly with a 5 mm paralabral cyst adjacent to the posterior labrum. This is not optimally demonstrated due to motion but is consistent with an extensive labral tear. The articular cartilage at the glenohumeral joint appears intact; no joint effusion is present.  There is no lesion in the spinoglenoid notch, quadrilateral space, nor in the visualized axilla.      Impression: 1. A 5 mm diameter near full-thickness supraspinatus tendon insertional tear anteriorly. 2. Large tear of the glenoid labrum with a paralabral cyst posteriorly.  This report was finalized on 9/14/2023 8:52 AM by Dr. Orestes Echols M.D.              Assessment and Plan     Diagnoses and all orders for this visit:    1. Left shoulder pain, unspecified chronicity (Primary)    2. Rotator cuff tear of left rotator cuff        Discussed the treatment plan with the patient. I reviewed the MRI results with the patient.     Discussed the risks and benefits of conservative measures.  The patient expressed understanding and  wished to proceed with a left shoulder steroid injection.      Prescribed physical therapy.       Call or return if worsening symptoms.    Follow Up     PRN      Patient was given instructions and counseling regarding her condition or for health maintenance advice. Please see specific information pulled into the AVS if appropriate.     Scribed for Carl Swartz MD by Leonila Sol MA.  09/18/23   10:30 EDT      I have personally performed the services described in this document as scribed by the above individual and it is both accurate and complete. Carl Swartz MD 09/20/23

## 2023-09-20 ENCOUNTER — OFFICE VISIT (OUTPATIENT)
Dept: FAMILY MEDICINE CLINIC | Facility: CLINIC | Age: 58
End: 2023-09-20
Payer: COMMERCIAL

## 2023-09-20 VITALS
TEMPERATURE: 98 F | HEART RATE: 82 BPM | SYSTOLIC BLOOD PRESSURE: 132 MMHG | HEIGHT: 67 IN | DIASTOLIC BLOOD PRESSURE: 84 MMHG | RESPIRATION RATE: 20 BRPM | BODY MASS INDEX: 28.47 KG/M2 | OXYGEN SATURATION: 99 % | WEIGHT: 181.4 LBS

## 2023-09-20 DIAGNOSIS — J06.9 ACUTE URI: Primary | ICD-10-CM

## 2023-09-20 PROCEDURE — 99213 OFFICE O/P EST LOW 20 MIN: CPT | Performed by: NURSE PRACTITIONER

## 2023-09-20 RX ORDER — BROMPHENIRAMINE MALEATE, PSEUDOEPHEDRINE HYDROCHLORIDE, AND DEXTROMETHORPHAN HYDROBROMIDE 2; 30; 10 MG/5ML; MG/5ML; MG/5ML
5 SYRUP ORAL 4 TIMES DAILY PRN
Qty: 240 ML | Refills: 0 | Status: SHIPPED | OUTPATIENT
Start: 2023-09-20

## 2023-09-20 RX ORDER — LIDOCAINE HYDROCHLORIDE 10 MG/ML
5 INJECTION, SOLUTION INFILTRATION; PERINEURAL
Status: COMPLETED | OUTPATIENT
Start: 2023-09-18 | End: 2023-09-18

## 2023-09-20 RX ORDER — TRIAMCINOLONE ACETONIDE 40 MG/ML
40 INJECTION, SUSPENSION INTRA-ARTICULAR; INTRAMUSCULAR
Status: COMPLETED | OUTPATIENT
Start: 2023-09-18 | End: 2023-09-18

## 2023-09-20 RX ORDER — ASPIRIN 81 MG/1
81 TABLET ORAL DAILY
COMMUNITY

## 2023-09-20 RX ORDER — PREDNISONE 20 MG/1
20 TABLET ORAL 2 TIMES DAILY
Qty: 10 TABLET | Refills: 0 | Status: SHIPPED | OUTPATIENT
Start: 2023-09-20 | End: 2023-09-25

## 2023-09-20 NOTE — PROGRESS NOTES
Chief Complaint  Cough (States symptoms started yesterday/) and Sore Throat (States scratchy throat)    Subjective          Amber Poole presents to [facility]  History of Present Illness  She is here for sick visit.  She said that last week she was helping her  with a.  She said that she has not had any fevers.  She said the cough started yesterday.  She has had clear drainage.  She has had a scratchy throat.  She has been using cough drops but no other medication than what she usually takes on a daily basis.  She has been around strep but has not drank after them.  She has not had any chills or body aches.  She is not coughing up anything.    Depression: Not at risk    PHQ-2 Score: 0    and 3/7/2023               Allergies  Codeine and Penicillins    Social History     Tobacco Use    Smoking status: Never    Smokeless tobacco: Never   Vaping Use    Vaping Use: Never used   Substance Use Topics    Alcohol use: Never    Drug use: Never       Family History   Problem Relation Age of Onset    Diabetes Mother     Kidney nephrosis Mother     Heart disease Father     Vision loss Father     Macular degeneration Father     Kidney nephrosis Maternal Grandmother     Other Maternal Grandmother         BLADDER CALCULUS    Liver cancer Other         UNSPECIFIED    Lung cancer Other         UNSPECIFIED    Cancer Daughter     Leukemia Daughter     Leukemia Son     Macular degeneration Sister         Health Maintenance Due   Topic Date Due    COVID-19 Vaccine (1) Never done    ZOSTER VACCINE (1 of 2) Never done    ANNUAL PHYSICAL  Never done        Immunization History   Administered Date(s) Administered    Influenza, Unspecified 10/01/2020    Tdap 08/06/2018       Review of Systems   Constitutional:  Negative for chills, diaphoresis and fever.   HENT:  Positive for congestion, postnasal drip, rhinorrhea and sore throat. Negative for sinus pressure.    Respiratory:  Positive for  "cough.       Objective       Vitals:    09/20/23 0935 09/20/23 0959   BP: 150/82 132/84   Pulse: 82    Resp: 20    Temp: 98 °F (36.7 °C)    SpO2: 99%    Weight: 82.3 kg (181 lb 6.4 oz)    Height: 170.2 cm (67\")        Body mass index is 28.41 kg/m².         Physical Exam  Vitals reviewed.   Constitutional:       Appearance: Normal appearance. She is well-developed.   HENT:      Right Ear: Tympanic membrane and ear canal normal.      Left Ear: Tympanic membrane and ear canal normal.      Nose: Congestion and rhinorrhea present.      Mouth/Throat:      Pharynx: Posterior oropharyngeal erythema present. No oropharyngeal exudate.   Eyes:      Conjunctiva/sclera: Conjunctivae normal.   Cardiovascular:      Rate and Rhythm: Normal rate and regular rhythm.      Heart sounds: Normal heart sounds. No murmur heard.  Pulmonary:      Effort: Pulmonary effort is normal.      Breath sounds: Normal breath sounds. No wheezing or rhonchi.   Neurological:      Mental Status: She is alert and oriented to person, place, and time.      Cranial Nerves: No cranial nerve deficit.      Motor: No weakness.   Psychiatric:         Mood and Affect: Mood and affect normal.           Result Review :     The following data was reviewed by: SHAILESH Colvin on 09/20/2023:                     Assessment and Plan      Diagnoses and all orders for this visit:    1. Acute URI (Primary)  -     predniSONE (DELTASONE) 20 MG tablet; Take 1 tablet by mouth 2 (Two) Times a Day for 5 days.  Dispense: 10 tablet; Refill: 0  -     brompheniramine-pseudoephedrine-DM 30-2-10 MG/5ML syrup; Take 5 mL by mouth 4 (Four) Times a Day As Needed for Allergies.  Dispense: 240 mL; Refill: 0            Follow Up     Return if symptoms worsen or fail to improve.  She declined the COVID flu test.  We also did discuss about blood pressure though her blood pressure after the recheck was better.  Discussed to push fluids.  We will do steroids and cough medicine.  If not " feeling better by Monday Tuesday then we will do some antibiotics.  Discussed the difference between a URI versus allergies.  There is not a necessity currently for antibiotics.  Patient was given instructions and counseling regarding her condition or for health maintenance advice. Please see specific information pulled into the AVS if appropriate.     Parts of this note are electronic transcriptions/translations of spoken language to printed text using the Dragon Dictation system.          Faith Ramos, SHAILESH  09/20/2023

## 2023-09-26 DIAGNOSIS — J06.9 ACUTE URI: ICD-10-CM

## 2023-09-26 RX ORDER — BROMPHENIRAMINE MALEATE, PSEUDOEPHEDRINE HYDROCHLORIDE, AND DEXTROMETHORPHAN HYDROBROMIDE 2; 30; 10 MG/5ML; MG/5ML; MG/5ML
5 SYRUP ORAL 4 TIMES DAILY PRN
Qty: 240 ML | Refills: 0 | Status: SHIPPED | OUTPATIENT
Start: 2023-09-26

## 2023-10-26 ENCOUNTER — TELEPHONE (OUTPATIENT)
Dept: FAMILY MEDICINE CLINIC | Facility: CLINIC | Age: 58
End: 2023-10-26

## 2023-10-26 ENCOUNTER — OFFICE VISIT (OUTPATIENT)
Dept: FAMILY MEDICINE CLINIC | Facility: CLINIC | Age: 58
End: 2023-10-26
Payer: COMMERCIAL

## 2023-10-26 VITALS
RESPIRATION RATE: 16 BRPM | DIASTOLIC BLOOD PRESSURE: 80 MMHG | TEMPERATURE: 97.7 F | WEIGHT: 181.5 LBS | HEART RATE: 71 BPM | HEIGHT: 67 IN | OXYGEN SATURATION: 98 % | BODY MASS INDEX: 28.49 KG/M2 | SYSTOLIC BLOOD PRESSURE: 130 MMHG

## 2023-10-26 DIAGNOSIS — K92.1 BLOOD IN STOOL: ICD-10-CM

## 2023-10-26 DIAGNOSIS — D64.9 ANEMIA, UNSPECIFIED TYPE: ICD-10-CM

## 2023-10-26 DIAGNOSIS — K58.2 IRRITABLE BOWEL SYNDROME WITH BOTH CONSTIPATION AND DIARRHEA: ICD-10-CM

## 2023-10-26 DIAGNOSIS — K64.9 HEMORRHOIDS, UNSPECIFIED HEMORRHOID TYPE: Primary | ICD-10-CM

## 2023-10-26 LAB
BASOPHILS # BLD AUTO: 0.04 10*3/MM3 (ref 0–0.2)
BASOPHILS NFR BLD AUTO: 0.6 % (ref 0–1.5)
DEPRECATED RDW RBC AUTO: 42.6 FL (ref 37–54)
EOSINOPHIL # BLD AUTO: 0.14 10*3/MM3 (ref 0–0.4)
EOSINOPHIL NFR BLD AUTO: 2 % (ref 0.3–6.2)
ERYTHROCYTE [DISTWIDTH] IN BLOOD BY AUTOMATED COUNT: 13.3 % (ref 12.3–15.4)
FERRITIN SERPL-MCNC: 53 NG/ML (ref 13–150)
HCT VFR BLD AUTO: 40.9 % (ref 34–46.6)
HGB BLD-MCNC: 13.7 G/DL (ref 12–15.9)
IMM GRANULOCYTES # BLD AUTO: 0.04 10*3/MM3 (ref 0–0.05)
IMM GRANULOCYTES NFR BLD AUTO: 0.6 % (ref 0–0.5)
IRON 24H UR-MRATE: 70 MCG/DL (ref 37–145)
IRON SATN MFR SERPL: 17 % (ref 20–50)
LYMPHOCYTES # BLD AUTO: 1.57 10*3/MM3 (ref 0.7–3.1)
LYMPHOCYTES NFR BLD AUTO: 22.7 % (ref 19.6–45.3)
MCH RBC QN AUTO: 29.5 PG (ref 26.6–33)
MCHC RBC AUTO-ENTMCNC: 33.5 G/DL (ref 31.5–35.7)
MCV RBC AUTO: 88 FL (ref 79–97)
MONOCYTES # BLD AUTO: 0.54 10*3/MM3 (ref 0.1–0.9)
MONOCYTES NFR BLD AUTO: 7.8 % (ref 5–12)
NEUTROPHILS NFR BLD AUTO: 4.6 10*3/MM3 (ref 1.7–7)
NEUTROPHILS NFR BLD AUTO: 66.3 % (ref 42.7–76)
NRBC BLD AUTO-RTO: 0 /100 WBC (ref 0–0.2)
PLATELET # BLD AUTO: 304 10*3/MM3 (ref 140–450)
PMV BLD AUTO: 11.5 FL (ref 6–12)
RBC # BLD AUTO: 4.65 10*6/MM3 (ref 3.77–5.28)
TIBC SERPL-MCNC: 405 MCG/DL (ref 298–536)
TRANSFERRIN SERPL-MCNC: 272 MG/DL (ref 200–360)
WBC NRBC COR # BLD: 6.93 10*3/MM3 (ref 3.4–10.8)

## 2023-10-26 PROCEDURE — 82728 ASSAY OF FERRITIN: CPT | Performed by: NURSE PRACTITIONER

## 2023-10-26 PROCEDURE — 85025 COMPLETE CBC W/AUTO DIFF WBC: CPT | Performed by: NURSE PRACTITIONER

## 2023-10-26 PROCEDURE — 84466 ASSAY OF TRANSFERRIN: CPT | Performed by: NURSE PRACTITIONER

## 2023-10-26 PROCEDURE — 83540 ASSAY OF IRON: CPT | Performed by: NURSE PRACTITIONER

## 2023-10-26 RX ORDER — PYRITHIONE ZINC 1 G/ML
1 LOTION/SHAMPOO TOPICAL 2 TIMES DAILY
Qty: 60 TABLET | Refills: 2 | Status: SHIPPED | OUTPATIENT
Start: 2023-10-26

## 2023-10-26 NOTE — TELEPHONE ENCOUNTER
THE [facility] F[facility]ER DOES NOT HAVE A CHEW - THEY DO HAVE A GUMMY BUT HER INSURANCE WON'T PAY FOR IT    IS THERE SOMETHING ELSE YOU WANT HER TO HAVE

## 2023-10-30 ENCOUNTER — OFFICE VISIT (OUTPATIENT)
Dept: ORTHOPEDIC SURGERY | Facility: CLINIC | Age: 58
End: 2023-10-30
Payer: COMMERCIAL

## 2023-10-30 VITALS
OXYGEN SATURATION: 97 % | HEIGHT: 67 IN | HEART RATE: 74 BPM | SYSTOLIC BLOOD PRESSURE: 126 MMHG | BODY MASS INDEX: 28.88 KG/M2 | DIASTOLIC BLOOD PRESSURE: 81 MMHG | WEIGHT: 184 LBS

## 2023-10-30 DIAGNOSIS — M25.512 CHRONIC LEFT SHOULDER PAIN: Primary | ICD-10-CM

## 2023-10-30 DIAGNOSIS — G89.29 CHRONIC LEFT SHOULDER PAIN: Primary | ICD-10-CM

## 2023-10-30 DIAGNOSIS — M25.512 LEFT SHOULDER PAIN, UNSPECIFIED CHRONICITY: Primary | ICD-10-CM

## 2023-10-30 PROCEDURE — 99213 OFFICE O/P EST LOW 20 MIN: CPT

## 2023-10-30 NOTE — PATIENT INSTRUCTIONS
Discussed treatment plan of care with patient.  We discussed that steroid injections can be given every 3 months as needed.  We discussed trying physical therapy, however patient feels that she can do home exercises.  HEP given to her in office today.  Instructed patient that if she changes her mind about physical therapy she can call for order.  Patient verbalizes understanding.    Follow-up in 6 weeks to evaluate pain.  Call for questions, concerns or worsening symptoms.

## 2023-10-30 NOTE — TELEPHONE ENCOUNTER
Patient seen in office today with Kizzy Atkins.  Requesting nerve pain relief.  [facility].

## 2023-10-30 NOTE — PROGRESS NOTES
"Chief Complaint  Follow-up of the Left Shoulder    Subjective      Amber Poole presents to [facility] for follow-up of left shoulder pain.  Patient had an MRI on 9/13/2023 which revealed a rotator cuff tear.  She received a steroid injection on 9/18/2023 and reports that she feels approximately 65% better.  Reports that she is still having some pain.  She decided not to go to physical therapy due to her improvement in pain she was unsure if she needed it.    Objective   Allergies   Allergen Reactions    Codeine Anaphylaxis and Rash    Penicillins Rash       Vital Signs:   /81   Pulse 74   Ht 170.2 cm (67\")   Wt 83.5 kg (184 lb)   SpO2 97%   BMI 28.82 kg/m²       Physical Exam    Constitutional: Awake, alert. Well nourished appearance.    Integumentary: Warm, dry, intact. No obvious rashes.    HENT: Atraumatic, normocephalic.   Respiratory: Non labored respirations .   Cardiovascular: Intact peripheral pulses.    Psychiatric: Normal mood and affect. A&O X3    Ortho Exam  Left shoulder: Active range of motion forward shoulder flexion 180 degrees, abduction 170, internal rotation T12, external rotation 70 degrees.  Good strength to rotator cuff.  Neurovascular intact.  Sensation is intact.  Full range of motion of the upon the wrist.    Imaging Results (Most Recent)       None                      Assessment and Plan   Problem List Items Addressed This Visit    None  Visit Diagnoses       Chronic left shoulder pain    -  Primary            Follow Up   Return in about 6 weeks (around 12/11/2023).    Patient is a non-smoker, did not discuss options for smoking cessation.     Social History     Socioeconomic History    Marital status:    Tobacco Use    Smoking status: Never    Smokeless tobacco: Never   Vaping Use    Vaping Use: Never used   Substance and Sexual Activity    Alcohol use: Never    Drug use: Never    Sexual activity: Defer       Patient Instructions "   Discussed treatment plan of care with patient.  We discussed that steroid injections can be given every 3 months as needed.  We discussed trying physical therapy, however patient feels that she can do home exercises.  HEP given to her in office today.  Instructed patient that if she changes her mind about physical therapy she can call for order.  Patient verbalizes understanding.    Follow-up in 6 weeks to evaluate pain.  Call for questions, concerns or worsening symptoms.      Patient was given instructions and counseling regarding her condition or for health maintenance advice. Please see specific information pulled into the AVS if appropriate.

## 2023-11-02 ENCOUNTER — PREP FOR SURGERY (OUTPATIENT)
Dept: OTHER | Facility: HOSPITAL | Age: 58
End: 2023-11-02
Payer: COMMERCIAL

## 2023-11-02 ENCOUNTER — OFFICE VISIT (OUTPATIENT)
Dept: SURGERY | Facility: CLINIC | Age: 58
End: 2023-11-02
Payer: COMMERCIAL

## 2023-11-02 VITALS
SYSTOLIC BLOOD PRESSURE: 142 MMHG | BODY MASS INDEX: 28.56 KG/M2 | HEIGHT: 67 IN | HEART RATE: 70 BPM | WEIGHT: 182 LBS | DIASTOLIC BLOOD PRESSURE: 90 MMHG

## 2023-11-02 DIAGNOSIS — K92.1 BLOOD IN STOOL: ICD-10-CM

## 2023-11-02 DIAGNOSIS — D50.8 OTHER IRON DEFICIENCY ANEMIA: Primary | ICD-10-CM

## 2023-11-02 PROCEDURE — 99203 OFFICE O/P NEW LOW 30 MIN: CPT | Performed by: NURSE PRACTITIONER

## 2023-11-02 RX ORDER — SODIUM CHLORIDE 0.9 % (FLUSH) 0.9 %
3 SYRINGE (ML) INJECTION EVERY 12 HOURS SCHEDULED
OUTPATIENT
Start: 2023-11-02

## 2023-11-02 RX ORDER — SODIUM CHLORIDE 9 MG/ML
40 INJECTION, SOLUTION INTRAVENOUS AS NEEDED
OUTPATIENT
Start: 2023-11-02

## 2023-11-02 RX ORDER — SODIUM CHLORIDE 0.9 % (FLUSH) 0.9 %
10 SYRINGE (ML) INJECTION AS NEEDED
OUTPATIENT
Start: 2023-11-02

## 2023-11-02 RX ORDER — SODIUM, POTASSIUM,MAG SULFATES 17.5-3.13G
SOLUTION, RECONSTITUTED, ORAL ORAL
Qty: 354 ML | Refills: 0 | Status: SHIPPED | OUTPATIENT
Start: 2023-11-02

## 2023-11-02 NOTE — PROGRESS NOTES
Chief Complaint: Hemorrhoids    Subjective      EGD and colonoscopy consultation       History of Present Illness  Amber Poole is a 58 y.o. female presents to [facility] for EGD and colonoscopy consultation.    Patient presents today on referral from Josephine Rosa for iron deficiency anemia (rbc: 3.75; hgb: 11.0; hct: 33.5; iron sat: 17%).    Patient admits to shortness of breath upon exertion.  Admits to some fatigue.    Patient denies any dysphagia.  Denies any heartburn or indigestion.  Denies any epigastric pain.  Denies any melena.  Denies any pain before or after eating.    Patient does report that she has seen some blood in her stool x3.  This occurred several months ago and has resolved since then.  She denies any abdominal pain.  She does note a change in bowel habit with narrow pencillike stools.  She denies any family history of colorectal cancer.    Patient denies EVELINA.  Denies any cardiac issues.  Denies taking a GLP-1 receptors.    5/21: Hemorrhoidectomy (Dheeraj): 2 column.    3/18: Colonoscopy (dheeraj): normal colon.       Objective     Past Medical History:   Diagnosis Date    Allergic 1997    Allergies     Arthritis     Bilateral primary osteoarthritis of knee 02/22/2023    Blood clotting disorder     MTHFR-  OVER CLOTTING- TAKES ASA DAILY- SEE'S PCP    Chronic allergic rhinitis     Hashimoto's thyroiditis 2022    Hemorrhoid     - RESOLVED    Hypercoagulable state     Hypothyroidism 1987    Knee pain     BILATERAL    PONV (postoperative nausea and vomiting)     Seasonal allergies     Thyroid disorder     Torn meniscus 08/2020    LEFT KNEE       Past Surgical History:   Procedure Laterality Date    COLONOSCOPY  2021    HEMORRHOIDECTOMY      JOINT REPLACEMENT  5-26-23    Bilateral knee replacement    KNEE CARTILAGE SURGERY  2020    BEEN 08.2020    LASER ABLATION  1998    UTERINE, N[place]ER[place]    TONSILLECTOMY      TOTAL KNEE ARTHROPLASTY Bilateral  05/26/2023    Procedure: BILATERAL  TOTAL KNEE ARTHROPLASTY WITH BRYANT ROBOT;  Surgeon: Carl Swartz MD;  Location: [facility] MA[facility] OR;  Service: Robotics - Ortho;  Laterality: Bilateral;    TUBAL ABDOMINAL LIGATION  1995       Outpatient Medications Marked as Taking for the 11/2/23 encounter (Office Visit) with Simba, April, SHAILESH   Medication Sig Dispense Refill    ascorbic acid (VITAMIN C) 500 MG tablet Take 2 tablets by mouth Daily.      aspirin 81 MG EC tablet Take 1 tablet by mouth Daily.      atorvastatin (Lipitor) 20 MG tablet Take 1 tablet by mouth Daily. 90 tablet 1    brompheniramine-pseudoephedrine-DM 30-2-10 MG/5ML syrup Take 5 mL by mouth 4 (Four) Times a Day As Needed for Allergies. 240 mL 0    celecoxib (CeleBREX) 100 MG capsule Take 1 capsule by mouth Every 12 (Twelve) Hours. 60 capsule 2    Cholecalciferol (Vitamin D3) 50 MCG (2000 UT) capsule Take 1 capsule by mouth Daily.      Diclofenac Sodium (VOLTAREN) 1 % gel gel apply to the affected area FOUR TIMES DAILY      docusate calcium (GNP Stool Softener) 240 MG capsule Take 1 capsule by mouth Daily. 90 capsule 1    docusate sodium (COLACE) 100 MG capsule Take 1 capsule by mouth 2 (Two) Times a Day. 180 capsule 1    escitalopram (Lexapro) 10 MG tablet Take 1 tablet by mouth Daily. 90 tablet 1    Ibuprofen 3 %, Gabapentin 10 %, Baclofen 2 %, lidocaine 4 %, Ketamine HCl 4 % Apply 1-2 g topically to the appropriate area as directed 3 (Three) to 4 (Four) times daily. 90 g 5    liothyronine (CYTOMEL) 5 MCG tablet Take 2 tablets by mouth Daily.      loratadine (CLARITIN) 10 MG tablet Take 1 tablet by mouth Daily. 90 tablet 1    Metamucil Fiber chewable tablet Chew 1 tablet 2 (Two) Times a Day. 60 tablet 2    montelukast (SINGULAIR) 10 MG tablet Take 1 tablet by mouth every night at bedtime. 90 tablet 1    norethindrone-ethinyl estradiol (FEMHRT 1/5) 1-5 MG-MCG tablet Take 1 tablet by mouth Daily.      Synthroid 300 MCG tablet Take 1 tablet by mouth. Takes 6  "days per week-hold on sunday      traZODone (DESYREL) 100 MG tablet Take 1 tablet by mouth Every Night.      Zinc 50 MG capsule Take 50 mg by mouth Daily.         Allergies   Allergen Reactions    Codeine Anaphylaxis and Rash    Penicillins Rash        Family History   Problem Relation Age of Onset    Diabetes Mother     Kidney nephrosis Mother     Heart disease Father     Vision loss Father     Macular degeneration Father     Kidney nephrosis Maternal Grandmother     Other Maternal Grandmother         BLADDER CALCULUS    Liver cancer Other         UNSPECIFIED    Lung cancer Other         UNSPECIFIED    Cancer Daughter     Leukemia Daughter     Leukemia Son     Macular degeneration Sister        Social History     Socioeconomic History    Marital status:    Tobacco Use    Smoking status: Never    Smokeless tobacco: Never   Vaping Use    Vaping Use: Never used   Substance and Sexual Activity    Alcohol use: Never    Drug use: Never    Sexual activity: Defer       Review of Systems   Constitutional:  Positive for fatigue. Negative for chills, fever and unexpected weight loss.   HENT:  Negative for trouble swallowing.    Gastrointestinal:  Positive for anal bleeding and blood in stool. Negative for abdominal distention, abdominal pain, constipation, diarrhea, nausea, rectal pain, vomiting, GERD and indigestion.        Vital Signs:   /90 (BP Location: Right arm)   Pulse 70   Ht 170.2 cm (67\")   Wt 82.6 kg (182 lb)   BMI 28.51 kg/m²      Physical Exam  Vitals and nursing note reviewed.   Constitutional:       Appearance: Normal appearance.   HENT:      Head: Normocephalic.   Cardiovascular:      Rate and Rhythm: Normal rate.   Pulmonary:      Effort: Pulmonary effort is normal.      Breath sounds: No stridor.   Abdominal:      Palpations: Abdomen is soft.      Tenderness: There is no guarding.   Musculoskeletal:         General: No deformity. Normal range of motion.   Skin:     General: Skin is warm and " dry.      Coloration: Skin is not jaundiced.   Neurological:      General: No focal deficit present.      Mental Status: She is alert and oriented to person, place, and time.   Psychiatric:         Mood and Affect: Mood normal.         Thought Content: Thought content normal.          Result Review :          []  Laboratory  []  Radiology  []  Pathology  []  Microbiology  []  EKG/Telemetry   []  Cardiology/Vascular   []  Old records  I spent 25 minutes caring for Amber on this date of service. This time includes time spent by me in the following activities: reviewing tests, obtaining and/or reviewing a separately obtained history, performing a medically appropriate examination and/or evaluation, ordering medications, tests, or procedures, and documenting information in the medical record.     Assessment and Plan    Diagnoses and all orders for this visit:    1. Other iron deficiency anemia (Primary)    2. Blood in stool    Other orders  -     sodium-potassium-magnesium sulfates (Suprep Bowel Prep Kit) 17.5-3.13-1.6 GM/177ML solution oral solution; Take as directed.  Instructions given in office.  Dispense: 2 bottles  Dispense: 354 mL; Refill: 0        Follow Up   Return for Schedule colonoscopy with Dr. Price on 11/9/2023 at [facility].    Hospital arrival time: 0600.    Possible risks/complications, benefits, and alternatives to surgical or invasive procedures have been explained to patient and/or legal guardian.    Patient has been evaluated and can tolerate anesthesia and/or sedation. Risks, benefits, and alternatives to anesthesia and sedation have been explained to the patient and/or legal guardian. Patient verbalizes understanding and is willing to proceed with the above plan.     Patient was given instructions and counseling regarding her condition or for health maintenance advice. Please see specific information pulled into the AVS if appropriate.

## 2023-11-02 NOTE — H&P (VIEW-ONLY)
Chief Complaint: Hemorrhoids    Subjective      EGD and colonoscopy consultation       History of Present Illness  Amber Poole is a 58 y.o. female presents to [facility] for EGD and colonoscopy consultation.    Patient presents today on referral from Josephine Rosa for iron deficiency anemia (rbc: 3.75; hgb: 11.0; hct: 33.5; iron sat: 17%).    Patient admits to shortness of breath upon exertion.  Admits to some fatigue.    Patient denies any dysphagia.  Denies any heartburn or indigestion.  Denies any epigastric pain.  Denies any melena.  Denies any pain before or after eating.    Patient does report that she has seen some blood in her stool x3.  This occurred several months ago and has resolved since then.  She denies any abdominal pain.  She does note a change in bowel habit with narrow pencillike stools.  She denies any family history of colorectal cancer.    Patient denies EVELINA.  Denies any cardiac issues.  Denies taking a GLP-1 receptors.    5/21: Hemorrhoidectomy (Dheeraj): 2 column.    3/18: Colonoscopy (dheeraj): normal colon.       Objective     Past Medical History:   Diagnosis Date    Allergic 1997    Allergies     Arthritis     Bilateral primary osteoarthritis of knee 02/22/2023    Blood clotting disorder     MTHFR-  OVER CLOTTING- TAKES ASA DAILY- SEE'S PCP    Chronic allergic rhinitis     Hashimoto's thyroiditis 2022    Hemorrhoid     - RESOLVED    Hypercoagulable state     Hypothyroidism 1987    Knee pain     BILATERAL    PONV (postoperative nausea and vomiting)     Seasonal allergies     Thyroid disorder     Torn meniscus 08/2020    LEFT KNEE       Past Surgical History:   Procedure Laterality Date    COLONOSCOPY  2021    HEMORRHOIDECTOMY      JOINT REPLACEMENT  5-26-23    Bilateral knee replacement    KNEE CARTILAGE SURGERY  2020    BEEN 08.2020    LASER ABLATION  1998    UTERINE, NETHERRS    TONSILLECTOMY      TOTAL KNEE ARTHROPLASTY Bilateral  05/26/2023    Procedure: BILATERAL  TOTAL KNEE ARTHROPLASTY WITH BRYANT ROBOT;  Surgeon: Carl Swartz MD;  Location: [facility] MAIN OR;  Service: Robotics - Ortho;  Laterality: Bilateral;    TUBAL ABDOMINAL LIGATION  1995       Outpatient Medications Marked as Taking for the 11/2/23 encounter (Office Visit) with Simba, April, SHAILESH   Medication Sig Dispense Refill    ascorbic acid (VITAMIN C) 500 MG tablet Take 2 tablets by mouth Daily.      aspirin 81 MG EC tablet Take 1 tablet by mouth Daily.      atorvastatin (Lipitor) 20 MG tablet Take 1 tablet by mouth Daily. 90 tablet 1    brompheniramine-pseudoephedrine-DM 30-2-10 MG/5ML syrup Take 5 mL by mouth 4 (Four) Times a Day As Needed for Allergies. 240 mL 0    celecoxib (CeleBREX) 100 MG capsule Take 1 capsule by mouth Every 12 (Twelve) Hours. 60 capsule 2    Cholecalciferol (Vitamin D3) 50 MCG (2000 UT) capsule Take 1 capsule by mouth Daily.      Diclofenac Sodium (VOLTAREN) 1 % gel gel apply to the affected area FOUR TIMES DAILY      docusate calcium (GNP Stool Softener) 240 MG capsule Take 1 capsule by mouth Daily. 90 capsule 1    docusate sodium (COLACE) 100 MG capsule Take 1 capsule by mouth 2 (Two) Times a Day. 180 capsule 1    escitalopram (Lexapro) 10 MG tablet Take 1 tablet by mouth Daily. 90 tablet 1    Ibuprofen 3 %, Gabapentin 10 %, Baclofen 2 %, lidocaine 4 %, Ketamine HCl 4 % Apply 1-2 g topically to the appropriate area as directed 3 (Three) to 4 (Four) times daily. 90 g 5    liothyronine (CYTOMEL) 5 MCG tablet Take 2 tablets by mouth Daily.      loratadine (CLARITIN) 10 MG tablet Take 1 tablet by mouth Daily. 90 tablet 1    Metamucil Fiber chewable tablet Chew 1 tablet 2 (Two) Times a Day. 60 tablet 2    montelukast (SINGULAIR) 10 MG tablet Take 1 tablet by mouth every night at bedtime. 90 tablet 1    norethindrone-ethinyl estradiol (FEMHRT 1/5) 1-5 MG-MCG tablet Take 1 tablet by mouth Daily.      Synthroid 300 MCG tablet Take 1 tablet by mouth. Takes 6  "days per week-hold on sunday      traZODone (DESYREL) 100 MG tablet Take 1 tablet by mouth Every Night.      Zinc 50 MG capsule Take 50 mg by mouth Daily.         Allergies   Allergen Reactions    Codeine Anaphylaxis and Rash    Penicillins Rash        Family History   Problem Relation Age of Onset    Diabetes Mother     Kidney nephrosis Mother     Heart disease Father     Vision loss Father     Macular degeneration Father     Kidney nephrosis Maternal Grandmother     Other Maternal Grandmother         BLADDER CALCULUS    Liver cancer Other         UNSPECIFIED    Lung cancer Other         UNSPECIFIED    Cancer Daughter     Leukemia Daughter     Leukemia Son     Macular degeneration Sister        Social History     Socioeconomic History    Marital status:    Tobacco Use    Smoking status: Never    Smokeless tobacco: Never   Vaping Use    Vaping Use: Never used   Substance and Sexual Activity    Alcohol use: Never    Drug use: Never    Sexual activity: Defer       Review of Systems   Constitutional:  Positive for fatigue. Negative for chills, fever and unexpected weight loss.   HENT:  Negative for trouble swallowing.    Gastrointestinal:  Positive for anal bleeding and blood in stool. Negative for abdominal distention, abdominal pain, constipation, diarrhea, nausea, rectal pain, vomiting, GERD and indigestion.        Vital Signs:   /90 (BP Location: Right arm)   Pulse 70   Ht 170.2 cm (67\")   Wt 82.6 kg (182 lb)   BMI 28.51 kg/m²      Physical Exam  Vitals and nursing note reviewed.   Constitutional:       Appearance: Normal appearance.   HENT:      Head: Normocephalic.   Cardiovascular:      Rate and Rhythm: Normal rate.   Pulmonary:      Effort: Pulmonary effort is normal.      Breath sounds: No stridor.   Abdominal:      Palpations: Abdomen is soft.      Tenderness: There is no guarding.   Musculoskeletal:         General: No deformity. Normal range of motion.   Skin:     General: Skin is warm and " dry.      Coloration: Skin is not jaundiced.   Neurological:      General: No focal deficit present.      Mental Status: She is alert and oriented to person, place, and time.   Psychiatric:         Mood and Affect: Mood normal.         Thought Content: Thought content normal.          Result Review :          []  Laboratory  []  Radiology  []  Pathology  []  Microbiology  []  EKG/Telemetry   []  Cardiology/Vascular   []  Old records  I spent 25 minutes caring for Amber on this date of service. This time includes time spent by me in the following activities: reviewing tests, obtaining and/or reviewing a separately obtained history, performing a medically appropriate examination and/or evaluation, ordering medications, tests, or procedures, and documenting information in the medical record.     Assessment and Plan    Diagnoses and all orders for this visit:    1. Other iron deficiency anemia (Primary)    2. Blood in stool    Other orders  -     sodium-potassium-magnesium sulfates (Suprep Bowel Prep Kit) 17.5-3.13-1.6 GM/177ML solution oral solution; Take as directed.  Instructions given in office.  Dispense: 2 bottles  Dispense: 354 mL; Refill: 0        Follow Up   Return for Schedule colonoscopy with Dr. Price on 11/9/2023 at [facility], [place], operated by [facility].    Hospital arrival time: 0600.    Possible risks/complications, benefits, and alternatives to surgical or invasive procedures have been explained to patient and/or legal guardian.    Patient has been evaluated and can tolerate anesthesia and/or sedation. Risks, benefits, and alternatives to anesthesia and sedation have been explained to the patient and/or legal guardian. Patient verbalizes understanding and is willing to proceed with the above plan.     Patient was given instructions and counseling regarding her condition or for health maintenance advice. Please see specific information pulled into the AVS if appropriate.

## 2023-11-03 NOTE — PRE-PROCEDURE INSTRUCTIONS
"Instructed on date and arrival time of 0600. Come to entrance \"C\". Must have  over age 18 to drive home.  May have two visitors; however, children under 12 must stay in waiting room.  Discussed clear liquid diet (no red or purple) and bowel prep.  May take medications as usual except for blood thinners, diabetic medications, and weight loss medications.  Bring list of medications.  Verbalized understanding of instructions given.  Instructed to call for questions or concerns.  " No

## 2023-11-07 ENCOUNTER — ANESTHESIA EVENT (OUTPATIENT)
Dept: GASTROENTEROLOGY | Facility: HOSPITAL | Age: 58
End: 2023-11-07
Payer: COMMERCIAL

## 2023-11-07 NOTE — ANESTHESIA PREPROCEDURE EVALUATION
Anesthesia Evaluation     Patient summary reviewed and Nursing notes reviewed   history of anesthetic complications:  PONV  NPO Solid Status: > 8 hours  NPO Liquid Status: > 6 hours           Airway   Mallampati: II  TM distance: >3 FB  Neck ROM: full  No difficulty expected  Dental - normal exam     Pulmonary - normal exam   Cardiovascular - normal exam  Exercise tolerance: good (4-7 METS)    ECG reviewed  Rhythm: regular  Rate: normal        Neuro/Psych  (+) numbness  GI/Hepatic/Renal/Endo    (+) thyroid problem hypothyroidism    Musculoskeletal     Abdominal    Substance History      OB/GYN          Other   arthritis,     ROS/Med Hx Other: EKG 08/06/2020 : HR 78 , Left anterior fascicular block                     Anesthesia Plan    ASA 2     general   total IV anesthesia  intravenous induction     Anesthetic plan, risks, benefits, and alternatives have been provided, discussed and informed consent has been obtained with: patient, other and child.  Pre-procedure education provided  Plan discussed with CRNA.      CODE STATUS:

## 2023-11-09 ENCOUNTER — ANESTHESIA (OUTPATIENT)
Dept: GASTROENTEROLOGY | Facility: HOSPITAL | Age: 58
End: 2023-11-09
Payer: COMMERCIAL

## 2023-11-09 ENCOUNTER — HOSPITAL ENCOUNTER (OUTPATIENT)
Facility: HOSPITAL | Age: 58
Setting detail: HOSPITAL OUTPATIENT SURGERY
Discharge: HOME | End: 2023-11-09
Attending: SURGERY | Admitting: SURGERY
Payer: COMMERCIAL

## 2023-11-09 VITALS
OXYGEN SATURATION: 96 % | HEART RATE: 71 BPM | WEIGHT: 176.37 LBS | BODY MASS INDEX: 27.62 KG/M2 | TEMPERATURE: 97.8 F | RESPIRATION RATE: 21 BRPM | DIASTOLIC BLOOD PRESSURE: 79 MMHG | SYSTOLIC BLOOD PRESSURE: 123 MMHG

## 2023-11-09 DIAGNOSIS — F51.01 PRIMARY INSOMNIA: ICD-10-CM

## 2023-11-09 DIAGNOSIS — D50.8 OTHER IRON DEFICIENCY ANEMIA: ICD-10-CM

## 2023-11-09 PROCEDURE — 25010000002 ONDANSETRON PER 1 MG

## 2023-11-09 PROCEDURE — 25810000003 LACTATED RINGERS PER 1000 ML

## 2023-11-09 PROCEDURE — 88305 TISSUE EXAM BY PATHOLOGIST: CPT | Performed by: SURGERY

## 2023-11-09 PROCEDURE — 25010000002 PROPOFOL 10 MG/ML EMULSION: Performed by: NURSE ANESTHETIST, CERTIFIED REGISTERED

## 2023-11-09 RX ORDER — LIDOCAINE HYDROCHLORIDE 20 MG/ML
INJECTION, SOLUTION EPIDURAL; INFILTRATION; INTRACAUDAL; PERINEURAL AS NEEDED
Status: DISCONTINUED | OUTPATIENT
Start: 2023-11-09 | End: 2023-11-09 | Stop reason: SURG

## 2023-11-09 RX ORDER — SODIUM CHLORIDE 0.9 % (FLUSH) 0.9 %
10 SYRINGE (ML) INJECTION AS NEEDED
Status: DISCONTINUED | OUTPATIENT
Start: 2023-11-09 | End: 2023-11-09 | Stop reason: HOSPADM

## 2023-11-09 RX ORDER — SODIUM CHLORIDE 0.9 % (FLUSH) 0.9 %
3 SYRINGE (ML) INJECTION EVERY 12 HOURS SCHEDULED
Status: DISCONTINUED | OUTPATIENT
Start: 2023-11-09 | End: 2023-11-09 | Stop reason: HOSPADM

## 2023-11-09 RX ORDER — PROPOFOL 10 MG/ML
VIAL (ML) INTRAVENOUS AS NEEDED
Status: DISCONTINUED | OUTPATIENT
Start: 2023-11-09 | End: 2023-11-09 | Stop reason: SURG

## 2023-11-09 RX ORDER — SODIUM CHLORIDE, SODIUM LACTATE, POTASSIUM CHLORIDE, CALCIUM CHLORIDE 600; 310; 30; 20 MG/100ML; MG/100ML; MG/100ML; MG/100ML
30 INJECTION, SOLUTION INTRAVENOUS CONTINUOUS
Status: DISCONTINUED | OUTPATIENT
Start: 2023-11-09 | End: 2023-11-09 | Stop reason: HOSPADM

## 2023-11-09 RX ORDER — TRAZODONE HYDROCHLORIDE 50 MG/1
TABLET ORAL
Qty: 30 TABLET | Refills: 5 | OUTPATIENT
Start: 2023-11-09

## 2023-11-09 RX ORDER — SODIUM CHLORIDE 9 MG/ML
40 INJECTION, SOLUTION INTRAVENOUS AS NEEDED
Status: DISCONTINUED | OUTPATIENT
Start: 2023-11-09 | End: 2023-11-09 | Stop reason: HOSPADM

## 2023-11-09 RX ORDER — ONDANSETRON 2 MG/ML
4 INJECTION INTRAMUSCULAR; INTRAVENOUS ONCE
Status: COMPLETED | OUTPATIENT
Start: 2023-11-09 | End: 2023-11-09

## 2023-11-09 RX ADMIN — LIDOCAINE HYDROCHLORIDE 40 MG: 20 INJECTION, SOLUTION EPIDURAL; INFILTRATION; INTRACAUDAL; PERINEURAL at 07:37

## 2023-11-09 RX ADMIN — PROPOFOL 250 MCG/KG/MIN: 10 INJECTION, EMULSION INTRAVENOUS at 07:37

## 2023-11-09 RX ADMIN — PROPOFOL 50 MG: 10 INJECTION, EMULSION INTRAVENOUS at 07:40

## 2023-11-09 RX ADMIN — ONDANSETRON 4 MG: 2 INJECTION INTRAMUSCULAR; INTRAVENOUS at 07:30

## 2023-11-09 RX ADMIN — PROPOFOL 100 MG: 10 INJECTION, EMULSION INTRAVENOUS at 07:37

## 2023-11-09 RX ADMIN — SODIUM CHLORIDE, POTASSIUM CHLORIDE, SODIUM LACTATE AND CALCIUM CHLORIDE 30 ML/HR: 600; 310; 30; 20 INJECTION, SOLUTION INTRAVENOUS at 06:34

## 2023-11-09 NOTE — ANESTHESIA POSTPROCEDURE EVALUATION
Patient: Amber Poole    Procedure Summary       Date: 11/09/23 Room / Location: [facility] ENDOSCOPY 1 / [facility] ENDOSCOPY    Anesthesia Start: 0735 Anesthesia Stop: 0804    Procedures:       COLONOSCOPY      ESOPHAGOGASTRODUODENOSCOPY Diagnosis:       Other iron deficiency anemia      (Other iron deficiency anemia [D50.8])    Surgeons: Brad Price MD Provider: Annabelle Moreno CRNA    Anesthesia Type: general ASA Status: 2            Anesthesia Type: general    Vitals  Vitals Value Taken Time   /62 11/09/23 0822   Temp 36.6 °C (97.8 °F) 11/09/23 0815   Pulse 67 11/09/23 0824   Resp 21 11/09/23 0815   SpO2 96 % 11/09/23 0824   Vitals shown include unfiled device data.        Post Anesthesia Care and Evaluation    Post-procedure mental status: acceptable.  Pain management: satisfactory to patient    Airway patency: patent  Anesthetic complications: No anesthetic complications    Cardiovascular status: acceptable  Respiratory status: acceptable    Comments: Per chart review

## 2023-11-09 NOTE — TELEPHONE ENCOUNTER
I thought pt had refills on file but per SDP she does not. Please advise if refill is appropriate

## 2023-11-10 LAB
CYTO UR: NORMAL
LAB AP CASE REPORT: NORMAL
LAB AP CLINICAL INFORMATION: NORMAL
PATH REPORT.FINAL DX SPEC: NORMAL
PATH REPORT.GROSS SPEC: NORMAL

## 2023-11-10 RX ORDER — TRAZODONE HYDROCHLORIDE 100 MG/1
100 TABLET ORAL NIGHTLY
Qty: 30 TABLET | Refills: 0 | Status: SHIPPED | OUTPATIENT
Start: 2023-11-10

## 2023-11-28 ENCOUNTER — OFFICE VISIT (OUTPATIENT)
Dept: SURGERY | Facility: CLINIC | Age: 58
End: 2023-11-28
Payer: COMMERCIAL

## 2023-11-28 VITALS
BODY MASS INDEX: 27.62 KG/M2 | WEIGHT: 176 LBS | HEART RATE: 77 BPM | HEIGHT: 67 IN | DIASTOLIC BLOOD PRESSURE: 83 MMHG | SYSTOLIC BLOOD PRESSURE: 131 MMHG

## 2023-11-28 DIAGNOSIS — K21.00 GASTROESOPHAGEAL REFLUX DISEASE WITH ESOPHAGITIS WITHOUT HEMORRHAGE: ICD-10-CM

## 2023-11-28 DIAGNOSIS — K29.30 CHRONIC SUPERFICIAL GASTRITIS WITHOUT BLEEDING: ICD-10-CM

## 2023-11-28 DIAGNOSIS — Z98.890 S/P ENDOSCOPY: Primary | ICD-10-CM

## 2023-11-28 DIAGNOSIS — Z98.890 S/P COLONOSCOPY: ICD-10-CM

## 2023-11-28 DIAGNOSIS — D50.9 IRON DEFICIENCY ANEMIA, UNSPECIFIED IRON DEFICIENCY ANEMIA TYPE: ICD-10-CM

## 2023-11-28 PROCEDURE — 99212 OFFICE O/P EST SF 10 MIN: CPT | Performed by: NURSE PRACTITIONER

## 2023-11-28 RX ORDER — AMANTADINE HYDROCHLORIDE 100 MG/1
TABLET ORAL
COMMUNITY
Start: 2023-11-20

## 2023-11-28 RX ORDER — FAMOTIDINE 20 MG/1
20 TABLET, FILM COATED ORAL 2 TIMES DAILY
Qty: 60 TABLET | Refills: 1 | Status: SHIPPED | OUTPATIENT
Start: 2023-11-28

## 2023-11-28 NOTE — PROGRESS NOTES
Chief Complaint: Post-op Follow-up    Subjective      Follow-up visit       History of Present Illness  Amber Poole is a 58 y.o. female presents to [facility] for follow-up visit.    Patient presents today for follow-up visit after undergoing EGD and colonoscopy on 11/9/2023 performed by Dr. Brad Price.  Scopes were performed for iron deficiency anemia.    Patient was with reflux esophagitis at the GE junction & gastritis per EGD/pathology.  Biopsy was negative for intestinal metaplasia, dysplasia or malignancy.  Colonoscopy was normal per colonoscopy.    Results for orders placed or performed during the hospital encounter of 11/09/23   Tissue Pathology Exam    Specimen: A: Gastric, Antrum; Tissue    B: GE Junction; Tissue   Result Value Ref Range    Case Report       Surgical Pathology Report                         Case: [id number]                                  Authorizing Provider:  Brad Price MD  Collected:           11/09/2023 07:40 AM          Ordering Location:     [facility] Received:            11/09/2023 10:11 AM                                 SUITES                                                                       Pathologist:           Lesley Chavarria DO                                                       Specimens:   1) - Gastric, Antrum, ANTRUM BIOPSIES                                                               2) - GE Junction, GE JUNCTION BIOPSIES                                                     Clinical Information       Other iron deficiency anemia      Final Diagnosis       1. Stomach, antrum, biopsy:   - Gastric antrum mucosa with mild reactive gastropathy   - Negative for Helicobacter pylori on routine H&E stain   - Negative for intestinal metaplasia, dysplasia and malignancy      2. Gastroesophageal junction, biopsy:   - Squamous mucosa with mild changes suggestive of reflux esophagitis   - Negative  "for intestinal metaplasia, dysplasia and malignancy          Gross Description       1. Gastric, Antrum.  Received in formalin and labeled \" antrum\" is a 0.5 cm fragment of tan soft tissue. The specimen is entirely submitted in one cassette.    2. GE Junction.  Received in formalin and labeled \" GE junction\" is a 0.5 cm fragment of tan soft tissue. The specimen is entirely submitted in one cassette.   RINA      Microscopic Description       Microscopic examination performed.       Post codes were performed without difficulty.  The patient tolerated the procedures well.    Patient denies any postoperative complications.      Objective     Past Medical History:   Diagnosis Date    Allergic 1997    Allergies     Arthritis     Arthritis 08/16/2021    Bilateral primary osteoarthritis of knee 02/22/2023    Blood clotting disorder     MTHFR-  OVER CLOTTING- TAKES ASA DAILY- SEE'S PCP    Chronic allergic rhinitis     Hashimoto's thyroiditis 2022    Hemorrhoid     - RESOLVED    Hypercoagulable state     Hypothyroidism 1987    Knee pain     BILATERAL    PONV (postoperative nausea and vomiting)     Seasonal allergies     Thyroid disorder     Torn meniscus 08/2020    LEFT KNEE       Past Surgical History:   Procedure Laterality Date    COLONOSCOPY  2021    COLONOSCOPY N/A 11/9/2023    Procedure: COLONOSCOPY;  Surgeon: Brad Price MD;  Location: [facility] ENDOSCOPY;  Service: General;  Laterality: N/A;    ENDOSCOPY N/A 11/9/2023    Procedure: ESOPHAGOGASTRODUODENOSCOPY;  Surgeon: Brad Price MD;  Location: [facility] ENDOSCOPY;  Service: General;  Laterality: N/A;  HIATAL HERNIA    HEMORRHOIDECTOMY      JOINT REPLACEMENT  5-26-23    Bilateral knee replacement    KNEE CARTILAGE SURGERY  2020 BEEN 08.2020    LASER ABLATION  1998    UTERINE, NETHERRS    TONSILLECTOMY      TOTAL KNEE ARTHROPLASTY Bilateral 05/26/2023    Procedure: BILATERAL  TOTAL KNEE ARTHROPLASTY WITH BRYANT ROBOT;  Surgeon: Carl Swartz MD;  " Location: [facility] MAIN OR;  Service: Robotics - Ortho;  Laterality: Bilateral;    TUBAL ABDOMINAL LIGATION  1995       Outpatient Medications Marked as Taking for the 11/28/23 encounter (Office Visit) with Karina Cottrell APRN   Medication Sig Dispense Refill    amantadine (SYMMETREL) 100 MG tablet       ascorbic acid (VITAMIN C) 500 MG tablet Take 2 tablets by mouth Daily.      aspirin 81 MG EC tablet Take 1 tablet by mouth Daily.      atorvastatin (Lipitor) 20 MG tablet Take 1 tablet by mouth Daily. 90 tablet 1    brompheniramine-pseudoephedrine-DM 30-2-10 MG/5ML syrup Take 5 mL by mouth 4 (Four) Times a Day As Needed for Allergies. 240 mL 0    celecoxib (CeleBREX) 100 MG capsule Take 1 capsule by mouth Every 12 (Twelve) Hours. 60 capsule 2    Cholecalciferol (Vitamin D3) 50 MCG (2000 UT) capsule Take 1 capsule by mouth Daily.      Diclofenac Sodium (VOLTAREN) 1 % gel gel apply to the affected area FOUR TIMES DAILY      escitalopram (Lexapro) 10 MG tablet Take 1 tablet by mouth Daily. 90 tablet 1    Ibuprofen 3 %, Gabapentin 10 %, Baclofen 2 %, lidocaine 4 %, Ketamine HCl 4 % Apply 1-2 g topically to the appropriate area as directed 3 (Three) to 4 (Four) times daily. 90 g 5    liothyronine (CYTOMEL) 5 MCG tablet Take 2 tablets by mouth Daily.      loratadine (CLARITIN) 10 MG tablet Take 1 tablet by mouth Daily. 90 tablet 1    Metamucil Fiber chewable tablet Chew 1 tablet 2 (Two) Times a Day. 60 tablet 2    montelukast (SINGULAIR) 10 MG tablet Take 1 tablet by mouth every night at bedtime. 90 tablet 1    norethindrone-ethinyl estradiol (FEMHRT 1/5) 1-5 MG-MCG tablet Take 1 tablet by mouth Daily.      Synthroid 300 MCG tablet Take 1 tablet by mouth. Takes 6 days per week-hold on sunday      Zinc 50 MG capsule Take 50 mg by mouth Daily.      [DISCONTINUED] traZODone (DESYREL) 100 MG tablet Take 1 tablet by mouth Every Night. 30 tablet 0       Allergies   Allergen Reactions    Codeine Anaphylaxis and Rash    Penicillins  "Rash        Family History   Problem Relation Age of Onset    Diabetes Mother     Kidney nephrosis Mother     Heart disease Father     Vision loss Father     Macular degeneration Father     Macular degeneration Sister     Cancer Daughter     Leukemia Daughter     Leukemia Son     Kidney nephrosis Maternal Grandmother     Other Maternal Grandmother         BLADDER CALCULUS    Liver cancer Other         UNSPECIFIED    Lung cancer Other         UNSPECIFIED    Malig Hyperthermia Neg Hx        Social History     Socioeconomic History    Marital status:    Tobacco Use    Smoking status: Never    Smokeless tobacco: Never   Vaping Use    Vaping Use: Never used   Substance and Sexual Activity    Alcohol use: Never    Drug use: Never    Sexual activity: Defer     Birth control/protection: Tubal ligation       Review of Systems   Constitutional:  Negative for chills and fever.   HENT:  Negative for trouble swallowing.    Gastrointestinal:  Negative for abdominal distention, abdominal pain, anal bleeding, blood in stool, constipation, diarrhea, nausea, rectal pain, vomiting, GERD and indigestion.        Vital Signs:   /83 (BP Location: Left arm)   Pulse 77   Ht 170.2 cm (67\")   Wt 79.8 kg (176 lb)   BMI 27.57 kg/m²      Physical Exam  Vitals and nursing note reviewed.   Constitutional:       General: She is not in acute distress.     Appearance: Normal appearance.   HENT:      Head: Normocephalic.   Cardiovascular:      Rate and Rhythm: Normal rate.   Pulmonary:      Effort: Pulmonary effort is normal.      Breath sounds: No stridor.   Abdominal:      Palpations: Abdomen is soft.      Tenderness: There is no guarding.   Musculoskeletal:         General: No deformity. Normal range of motion.   Skin:     General: Skin is warm and dry.      Coloration: Skin is not jaundiced.   Neurological:      General: No focal deficit present.      Mental Status: She is alert and oriented to person, place, and time. "   Psychiatric:         Mood and Affect: Mood normal.         Thought Content: Thought content normal.          Result Review :          []  Laboratory  []  Radiology  []  Pathology  []  Microbiology  []  EKG/Telemetry   []  Cardiology/Vascular   []  Old records  Today I reviewed Dr. Price's operative and pathology report.     Assessment and Plan    Diagnoses and all orders for this visit:    1. S/P endoscopy (Primary)    2. Iron deficiency anemia, unspecified iron deficiency anemia type  -     Ambulatory Referral to Hematology / Oncology    3. Gastroesophageal reflux disease with esophagitis without hemorrhage    4. S/P colonoscopy    5. Chronic superficial gastritis without bleeding    Other orders  -     famotidine (PEPCID) 20 MG tablet; Take 1 tablet by mouth 2 (Two) Times a Day.  Dispense: 60 tablet; Refill: 1        Follow Up   Return for Rescreen colon in 10 years follow-up in the interim as needed.    Today I did discuss with the patient that we do not have an answer for her iron deficiency anemia.  Patient's daughter has a history of leukemia, given the patient's iron deficiency anemia status I think it is best that she follow-up with a hematologist.  She is agreeable.    Take PPI or H2 blockers as prescribed    Avoid ASA and other NSAIDs such as ibuprofen    Avoid spicy foods and caffeine    Avoid smoking and excessive alcohol intake    Reduce stress/start stress reduction techniques    Avoid high fat diets    Increase fiber intake    Per AGA guidelines patient will follow-up for colonoscopy surveillance as directed.    Patient was given instructions and counseling regarding her condition or for health maintenance advice. Please see specific information pulled into the AVS if appropriate.

## 2023-11-29 ENCOUNTER — TELEPHONE (OUTPATIENT)
Dept: SURGERY | Facility: CLINIC | Age: 58
End: 2023-11-29
Payer: COMMERCIAL

## 2023-11-29 NOTE — TELEPHONE ENCOUNTER
Pt called back and I told her I will check and see if I can get this taken care of tomorrow and call her back.

## 2023-11-29 NOTE — TELEPHONE ENCOUNTER
PT CALLED AND STATED SHE WAS SEEN YESTERDAY IN OFFICE BY APRIL AND WAS TOLD SHE WAS GOING TO CALL IN A 90 DAY SUPPLY OF PEPCID WITH ENOUGH REFILLS TO LAST HER A YEAR. PT SAID SHE JUST GOT TO THE PHARMACY AND THERE WAS ONLY A 30 DAY SUPPLY WITH 1 REFILL. CBN # [phone]

## 2023-11-30 RX ORDER — TRAZODONE HYDROCHLORIDE 100 MG/1
100 TABLET ORAL
Qty: 30 TABLET | Refills: 0 | Status: SHIPPED | OUTPATIENT
Start: 2023-11-30

## 2023-11-30 NOTE — TELEPHONE ENCOUNTER
I have called the Pt pharmacy and I spoke to Kandy. The Rx was changed to disp 180 with 3 refills. I then called the Pt to make her aware. Pt V/U.

## 2023-12-11 ENCOUNTER — OFFICE VISIT (OUTPATIENT)
Dept: ORTHOPEDIC SURGERY | Facility: CLINIC | Age: 58
End: 2023-12-11
Payer: COMMERCIAL

## 2023-12-11 VITALS
HEIGHT: 67 IN | DIASTOLIC BLOOD PRESSURE: 84 MMHG | HEART RATE: 79 BPM | BODY MASS INDEX: 27.62 KG/M2 | SYSTOLIC BLOOD PRESSURE: 127 MMHG | WEIGHT: 176 LBS

## 2023-12-11 DIAGNOSIS — M25.512 LEFT SHOULDER PAIN, UNSPECIFIED CHRONICITY: Primary | ICD-10-CM

## 2023-12-11 DIAGNOSIS — G89.29 CHRONIC LEFT SHOULDER PAIN: Primary | ICD-10-CM

## 2023-12-11 DIAGNOSIS — M25.512 CHRONIC LEFT SHOULDER PAIN: Primary | ICD-10-CM

## 2023-12-11 RX ADMIN — LIDOCAINE HYDROCHLORIDE 5 ML: 10 INJECTION, SOLUTION INFILTRATION; PERINEURAL at 09:46

## 2023-12-11 RX ADMIN — TRIAMCINOLONE ACETONIDE 40 MG: 40 INJECTION, SUSPENSION INTRA-ARTICULAR; INTRAMUSCULAR at 09:46

## 2023-12-11 NOTE — TELEPHONE ENCOUNTER
Patient seen in office today with Kizzy Atkins; requesting refill of nerve cream.  [facility].

## 2023-12-11 NOTE — PROGRESS NOTES
"Chief Complaint  Follow-up of the Left Shoulder    Subjective      Amber Poole presents to [facility] for follow-up of left shoulder pain.  Patient had an MRI on 9/13/2023 which revealed a rotator cuff tear.  She has had a steroid injection on 9/18/2023 and reports she is continuing to feel improvement.  Reports that she feels approximately 50% better, previously she had reported 65% better.  Reports that the injection is beginning to wear off and she would like to receive another injection.    Objective   Allergies   Allergen Reactions    Codeine Anaphylaxis and Rash    Penicillins Rash       Vital Signs:   /84   Pulse 79   Ht 170.2 cm (67\")   Wt 79.8 kg (176 lb)   BMI 27.57 kg/m²       Physical Exam    Constitutional: Awake, alert. Well nourished appearance.    Integumentary: Warm, dry, intact. No obvious rashes.    HENT: Atraumatic, normocephalic.   Respiratory: Non labored respirations .   Cardiovascular: Intact peripheral pulses.    Psychiatric: Normal mood and affect. A&O X3    Ortho Exam  Left shoulder: Active range of motion for shoulder flexion 175 degrees, abduction 150 degrees, internal rotation T12, external rotation 45 degrees.  Positive impingement sign.  Neurovascular intact.  Sensation is intact.    Imaging Results (Most Recent)       None             Large Joint LEFT SHOULDER  Date/Time: 12/11/2023 9:46 AM  Consent given by: patient  Site marked: site marked  Timeout: Immediately prior to procedure a time out was called to verify the correct patient, procedure, equipment, support staff and site/side marked as required   Supporting Documentation  Indications: pain   Procedure Details  Location: shoulder -   Preparation: Patient was prepped and draped in the usual sterile fashion  Needle gauge: 21G.  Medications administered: 5 mL lidocaine 1 %; 40 mg triamcinolone acetonide 40 MG/ML  Patient tolerance: patient tolerated the procedure well with no " immediate complications              Assessment and Plan   Problem List Items Addressed This Visit    None  Visit Diagnoses       Chronic left shoulder pain    -  Primary    Relevant Orders    Large Joint LEFT SHOULDER            Follow Up   Return in about 3 months (around 3/11/2024).    Patient is a non-smoker, did not discuss options for smoking cessation.     Social History     Socioeconomic History    Marital status:    Tobacco Use    Smoking status: Never    Smokeless tobacco: Never   Vaping Use    Vaping Use: Never used   Substance and Sexual Activity    Alcohol use: Never    Drug use: Never    Sexual activity: Defer     Birth control/protection: Tubal ligation       Patient Instructions   Left shoulder steroid injection administered in office today and tolerated the procedure well without complications.  Patient advised on 3 months duration between injections.  Patient is not a diabetic.    Follow-up in 3 months for repeat injection if needed.  Call with questions, concerns or worsening symptoms.   Patient was given instructions and counseling regarding her condition or for health maintenance advice. Please see specific information pulled into the AVS if appropriate.

## 2023-12-12 RX ORDER — TRIAMCINOLONE ACETONIDE 40 MG/ML
40 INJECTION, SUSPENSION INTRA-ARTICULAR; INTRAMUSCULAR
Status: COMPLETED | OUTPATIENT
Start: 2023-12-11 | End: 2023-12-11

## 2023-12-12 RX ORDER — LIDOCAINE HYDROCHLORIDE 10 MG/ML
5 INJECTION, SOLUTION INFILTRATION; PERINEURAL
Status: COMPLETED | OUTPATIENT
Start: 2023-12-11 | End: 2023-12-11

## 2023-12-12 NOTE — PATIENT INSTRUCTIONS
Left shoulder steroid injection administered in office today and tolerated the procedure well without complications.  Patient advised on 3 months duration between injections.  Patient is not a diabetic.    Follow-up in 3 months for repeat injection if needed.  Call with questions, concerns or worsening symptoms.

## 2023-12-13 ENCOUNTER — TELEPHONE (OUTPATIENT)
Dept: ORTHOPEDIC SURGERY | Facility: CLINIC | Age: 58
End: 2023-12-13
Payer: COMMERCIAL

## 2023-12-13 ENCOUNTER — CONSULT (OUTPATIENT)
Dept: ONCOLOGY | Facility: HOSPITAL | Age: 58
End: 2023-12-13
Payer: COMMERCIAL

## 2023-12-13 ENCOUNTER — LAB (OUTPATIENT)
Dept: ONCOLOGY | Facility: HOSPITAL | Age: 58
End: 2023-12-13
Payer: COMMERCIAL

## 2023-12-13 VITALS
RESPIRATION RATE: 18 BRPM | TEMPERATURE: 98.8 F | HEIGHT: 67 IN | BODY MASS INDEX: 28.55 KG/M2 | HEART RATE: 75 BPM | DIASTOLIC BLOOD PRESSURE: 86 MMHG | OXYGEN SATURATION: 98 % | WEIGHT: 181.88 LBS | SYSTOLIC BLOOD PRESSURE: 136 MMHG

## 2023-12-13 DIAGNOSIS — K59.00 CONSTIPATION, UNSPECIFIED CONSTIPATION TYPE: ICD-10-CM

## 2023-12-13 DIAGNOSIS — D64.9 ANEMIA, UNSPECIFIED TYPE: Primary | ICD-10-CM

## 2023-12-13 DIAGNOSIS — D50.9 IRON DEFICIENCY ANEMIA, UNSPECIFIED IRON DEFICIENCY ANEMIA TYPE: Primary | ICD-10-CM

## 2023-12-13 LAB
BASOPHILS # BLD AUTO: 0.02 10*3/MM3 (ref 0–0.2)
BASOPHILS NFR BLD AUTO: 0.2 % (ref 0–1.5)
DEPRECATED RDW RBC AUTO: 39.9 FL (ref 37–54)
EOSINOPHIL # BLD AUTO: 0.03 10*3/MM3 (ref 0–0.4)
EOSINOPHIL NFR BLD AUTO: 0.3 % (ref 0.3–6.2)
ERYTHROCYTE [DISTWIDTH] IN BLOOD BY AUTOMATED COUNT: 12.1 % (ref 12.3–15.4)
FERRITIN SERPL-MCNC: 52.37 NG/ML (ref 13–150)
FOLATE SERPL-MCNC: 14.1 NG/ML (ref 4.78–24.2)
HCT VFR BLD AUTO: 41 % (ref 34–46.6)
HGB BLD-MCNC: 13.3 G/DL (ref 12–15.9)
IMM GRANULOCYTES # BLD AUTO: 0.02 10*3/MM3 (ref 0–0.05)
IMM GRANULOCYTES NFR BLD AUTO: 0.2 % (ref 0–0.5)
IRON 24H UR-MRATE: 80 MCG/DL (ref 37–145)
IRON SATN MFR SERPL: 18 % (ref 20–50)
LYMPHOCYTES # BLD AUTO: 1.61 10*3/MM3 (ref 0.7–3.1)
LYMPHOCYTES NFR BLD AUTO: 16.9 % (ref 19.6–45.3)
MCH RBC QN AUTO: 28.9 PG (ref 26.6–33)
MCHC RBC AUTO-ENTMCNC: 32.4 G/DL (ref 31.5–35.7)
MCV RBC AUTO: 88.9 FL (ref 79–97)
MONOCYTES # BLD AUTO: 0.61 10*3/MM3 (ref 0.1–0.9)
MONOCYTES NFR BLD AUTO: 6.4 % (ref 5–12)
NEUTROPHILS NFR BLD AUTO: 7.21 10*3/MM3 (ref 1.7–7)
NEUTROPHILS NFR BLD AUTO: 76 % (ref 42.7–76)
PLATELET # BLD AUTO: 313 10*3/MM3 (ref 140–450)
PMV BLD AUTO: 10.1 FL (ref 6–12)
RBC # BLD AUTO: 4.61 10*6/MM3 (ref 3.77–5.28)
TIBC SERPL-MCNC: 435 MCG/DL (ref 298–536)
TRANSFERRIN SERPL-MCNC: 292 MG/DL (ref 200–360)
VIT B12 BLD-MCNC: 332 PG/ML (ref 211–946)
WBC NRBC COR # BLD AUTO: 9.5 10*3/MM3 (ref 3.4–10.8)

## 2023-12-13 PROCEDURE — 36415 COLL VENOUS BLD VENIPUNCTURE: CPT | Performed by: NURSE PRACTITIONER

## 2023-12-13 PROCEDURE — 82728 ASSAY OF FERRITIN: CPT | Performed by: NURSE PRACTITIONER

## 2023-12-13 PROCEDURE — G0463 HOSPITAL OUTPT CLINIC VISIT: HCPCS | Performed by: NURSE PRACTITIONER

## 2023-12-13 PROCEDURE — 83540 ASSAY OF IRON: CPT | Performed by: NURSE PRACTITIONER

## 2023-12-13 PROCEDURE — 85025 COMPLETE CBC W/AUTO DIFF WBC: CPT | Performed by: NURSE PRACTITIONER

## 2023-12-13 PROCEDURE — 82607 VITAMIN B-12: CPT | Performed by: NURSE PRACTITIONER

## 2023-12-13 PROCEDURE — 84466 ASSAY OF TRANSFERRIN: CPT | Performed by: NURSE PRACTITIONER

## 2023-12-13 PROCEDURE — 82746 ASSAY OF FOLIC ACID SERUM: CPT | Performed by: NURSE PRACTITIONER

## 2023-12-13 RX ORDER — L.ACID,CASEI/B.ANIMAL/S.THERMO 16B CELL
1 CAPSULE ORAL DAILY
COMMUNITY

## 2023-12-13 NOTE — TELEPHONE ENCOUNTER
PATIENT STATES THE [facility] COMPOUND CREAM SHE NORMALLY GETS HAS KETAMINE HCL 10%, BUT THE ONE SENT ON MONDAY IS ONLY 4%. SHE WOULD LIKE TO KNOW IF A NEW SCRIPT WITH THE KETAMINE 10%CAN BE SENT TO [facility]. SHE STATES THIS WORKS VERY WELL FOR HER.

## 2023-12-13 NOTE — PROGRESS NOTES
Chief Complaint/Reason for Referral:  Anemia    Simba, April, APRN  Richard, Faith, APRARIE    Records Obtained:  Records of the patients history including those obtained from  [facility] and patient information were reviewed and summarized in detail.    Subjective    History of present illness:   Ms. Amber Poole is a very pleasant 58 year old  female presenting for new patient evaluation for iron deficiency anemia as new pateint referral from April Brock, APRN.       Reports she recently completed an EGD and colonoscopy  screening with Dr. Price for iron deficiency anemia. Iron studies on 9/7/2024 with normal iron of 70, ferritin normal at 53, iron sat of 17%.  Reports she has been having constipation for awhile now. Reports she is taking oral fiber supplements. Reports has been takng probiotics as well. Reports history of prior hemorrhoid surgery several years ago with Dr. Esqueda.     EGD done on 11/9/2023   - Normal second portion of the duodenum.  - Normal antrum. Biopsied.  - Medium-sized hiatal hernia.  - Normal gastroesophageal junction. Biopsied.  Impression:  - Return to nurse practitioner (date not yet determined). Recommend capsule endoscopy if lower endoscopy  negative for bleeding source    11/9/2023 Colonoscopy: Normal.       Oncology/Hematology History    No history exists.       Review of Systems   Constitutional:  Positive for fatigue. Negative for appetite change, diaphoresis, fever, unexpected weight gain and unexpected weight loss.   HENT:  Negative for hearing loss, mouth sores, sore throat, swollen glands, trouble swallowing and voice change.    Eyes:  Negative for blurred vision.   Respiratory:  Negative for cough, shortness of breath and wheezing.    Cardiovascular:  Negative for chest pain and palpitations.   Gastrointestinal:  Negative for abdominal pain, blood in stool, constipation, diarrhea, nausea and vomiting.   Endocrine: Negative for cold intolerance and heat intolerance.    Genitourinary:  Negative for difficulty urinating, dysuria, frequency, hematuria and urinary incontinence.   Musculoskeletal:  Negative for arthralgias, back pain and myalgias.   Skin:  Negative for rash, skin lesions and wound.   Neurological:  Negative for dizziness, seizures, weakness, numbness and headache.   Hematological:  Does not bruise/bleed easily.   Psychiatric/Behavioral:  Negative for depressed mood. The patient is not nervous/anxious.    All other systems reviewed and are negative.      Current Outpatient Medications on File Prior to Visit   Medication Sig Dispense Refill    ascorbic acid (VITAMIN C) 500 MG tablet Take 2 tablets by mouth Daily.      aspirin 81 MG EC tablet Take 1 tablet by mouth Daily.      atorvastatin (Lipitor) 20 MG tablet Take 1 tablet by mouth Daily. 90 tablet 1    celecoxib (CeleBREX) 100 MG capsule Take 1 capsule by mouth Every 12 (Twelve) Hours. 60 capsule 2    Cholecalciferol (Vitamin D3) 50 MCG (2000 UT) capsule Take 1 capsule by mouth Daily.      Diclofenac Sodium (VOLTAREN) 1 % gel gel apply to the affected area FOUR TIMES DAILY      escitalopram (Lexapro) 10 MG tablet Take 1 tablet by mouth Daily. 90 tablet 1    famotidine (PEPCID) 20 MG tablet Take 1 tablet by mouth 2 (Two) Times a Day. 60 tablet 1    Ibuprofen 3 %, Gabapentin 10 %, Baclofen 2 %, lidocaine 4 %, Ketamine HCl 4 % Apply 1-2 g topically to the appropriate area as directed 3 (Three) to 4 (Four) times daily. 90 g 5    Ibuprofen 3 %, Gabapentin 10 %, Baclofen 2 %, lidocaine 4 %, Ketamine HCl 4 % Apply 1-2 g topically to the appropriate area as directed 3 (Three) to 4 (Four) times daily. 90 g 5    liothyronine (CYTOMEL) 5 MCG tablet Take 2 tablets by mouth Daily.      loratadine (CLARITIN) 10 MG tablet Take 1 tablet by mouth Daily. 90 tablet 1    Metamucil Fiber chewable tablet Chew 1 tablet 2 (Two) Times a Day. 60 tablet 2    montelukast (SINGULAIR) 10 MG tablet Take 1 tablet by mouth every night at bedtime.  90 tablet 1    norethindrone-ethinyl estradiol (FEMHRT 1/5) 1-5 MG-MCG tablet Take 1 tablet by mouth Daily.      Probiotic Product (Risaquad-2) capsule capsule Take 1 capsule by mouth Daily.      Synthroid 300 MCG tablet Take 1 tablet by mouth. Takes 6 days per week-hold on sunday      traZODone (DESYREL) 100 MG tablet TAKE ONE TABLET BY MOUTH AT BEDTIME 30 tablet 0    Zinc 50 MG capsule Take 50 mg by mouth Daily.      amantadine (SYMMETREL) 100 MG tablet       brompheniramine-pseudoephedrine-DM 30-2-10 MG/5ML syrup Take 5 mL by mouth 4 (Four) Times a Day As Needed for Allergies. (Patient not taking: Reported on 12/13/2023) 240 mL 0    docusate calcium (GNP Stool Softener) 240 MG capsule Take 1 capsule by mouth Daily. 90 capsule 1    docusate sodium (COLACE) 100 MG capsule Take 1 capsule by mouth 2 (Two) Times a Day. 180 capsule 1     No current facility-administered medications on file prior to visit.       Allergies   Allergen Reactions    Codeine Anaphylaxis and Rash    Penicillins Rash     Past Medical History:   Diagnosis Date    Allergic 1997    Allergies     Arthritis     Arthritis 08/16/2021    Bilateral primary osteoarthritis of knee 02/22/2023    Blood clotting disorder     MTHFR-  OVER CLOTTING- TAKES ASA DAILY- SEE'S PCP    Chronic allergic rhinitis     Hashimoto's thyroiditis 2022    Hemorrhoid     - RESOLVED    Hypercoagulable state     Hypothyroidism 1987    Knee pain     BILATERAL    PONV (postoperative nausea and vomiting)     Seasonal allergies     Thyroid disorder     Torn meniscus 08/2020    LEFT KNEE     Past Surgical History:   Procedure Laterality Date    COLONOSCOPY  2021    COLONOSCOPY N/A 11/9/2023    Procedure: COLONOSCOPY;  Surgeon: Brad Price MD;  Location: [facility];  Service: General;  Laterality: N/A;    ENDOSCOPY N/A 11/9/2023    Procedure: ESOPHAGOGASTRODUODENOSCOPY;  Surgeon: Brad Price MD;  Location: [facility] END[facility]COPY;  Service: General;  Laterality:  N/A;  HIATAL HERNIA    HEMORRHOIDECTOMY      JOINT REPLACEMENT  5-26-23    Bilateral knee replacement    KNEE CARTILAGE SURGERY  2020 BEEN 08.2020    LASER ABLATION  1998    UTERINE, NETHERRS    TONSILLECTOMY      TOTAL KNEE ARTHROPLASTY Bilateral 05/26/2023    Procedure: BILATERAL  TOTAL KNEE ARTHROPLASTY WITH BRYANT ROBOT;  Surgeon: Carl Swartz MD;  Location: [facility] MAIN OR;  Service: Robotics - Ortho;  Laterality: Bilateral;    TUBAL ABDOMINAL LIGATION  1995     Social History     Socioeconomic History    Marital status:    Tobacco Use    Smoking status: Never    Smokeless tobacco: Never   Vaping Use    Vaping Use: Never used   Substance and Sexual Activity    Alcohol use: Never    Drug use: Never    Sexual activity: Defer     Birth control/protection: Tubal ligation     Family History   Problem Relation Age of Onset    Diabetes Mother     Kidney nephrosis Mother     Heart disease Father     Vision loss Father     Macular degeneration Father     Macular degeneration Sister     Cancer Daughter     Leukemia Daughter     Leukemia Son     Kidney nephrosis Maternal Grandmother     Other Maternal Grandmother         BLADDER CALCULUS    Liver cancer Other         UNSPECIFIED    Lung cancer Other         UNSPECIFIED    Malig Hyperthermia Neg Hx      Immunization History   Administered Date(s) Administered    Influenza, Unspecified 10/01/2020    Tdap 08/06/2018       Tobacco Use: Low Risk  (12/13/2023)    Patient History     Smoking Tobacco Use: Never     Smokeless Tobacco Use: Never     Passive Exposure: Not on file       Objective     Physical Exam  Vitals and nursing note reviewed.   Constitutional:       Appearance: Normal appearance.   HENT:      Head: Normocephalic.      Nose: Nose normal.      Mouth/Throat:      Mouth: Mucous membranes are moist.   Eyes:      Pupils: Pupils are equal, round, and reactive to light.   Cardiovascular:      Rate and Rhythm: Normal rate and regular rhythm.      Pulses:  "Normal pulses.      Heart sounds: Normal heart sounds.   Pulmonary:      Effort: Pulmonary effort is normal. No respiratory distress.      Breath sounds: Normal breath sounds.   Abdominal:      General: Bowel sounds are normal. There is no distension.      Palpations: Abdomen is soft.   Musculoskeletal:         General: Normal range of motion.      Cervical back: Normal range of motion and neck supple.   Skin:     General: Skin is warm and dry.      Capillary Refill: Capillary refill takes less than 2 seconds.   Neurological:      General: No focal deficit present.      Mental Status: She is alert and oriented to person, place, and time.   Psychiatric:         Mood and Affect: Mood normal.         Behavior: Behavior normal.         Thought Content: Thought content normal.         Judgment: Judgment normal.         Vitals:    12/13/23 0959   BP: 136/86   Pulse: 75   Resp: 18   Temp: 98.8 °F (37.1 °C)   SpO2: 98%   Weight: 82.5 kg (181 lb 14.1 oz)   Height: 170.2 cm (67.01\")   PainSc: 0-No pain       Wt Readings from Last 3 Encounters:   12/13/23 82.5 kg (181 lb 14.1 oz)   12/11/23 79.8 kg (176 lb)   11/28/23 79.8 kg (176 lb)       ECOG score: 0         ECOG: (0) Fully Active - Able to Carry On All Pre-disease Performance Without Restriction  Fall Risk Assessment was completed, and patient is at low risk for falls.  PHQ-9 Total Score: 0       The patient is  experiencing fatigue. Fatigue score: 4    PT/OT Functional Screening: PT fx screen : No needs identified  Speech Functional Screening: Speech fx screen : No needs identified  Rehab to be ordered: Rehab to be ordered : No needs identified        Result Review :  The following data was reviewed by: SHAILESH Aldana on 12/13/2023:  Lab Results   Component Value Date    HGB 13.3 12/13/2023    HCT 41.0 12/13/2023    MCV 88.9 12/13/2023     12/13/2023    WBC 9.50 12/13/2023    NEUTROABS 7.21 (H) 12/13/2023    LYMPHSABS 1.61 12/13/2023    MONOSABS 0.61 " "12/13/2023    EOSABS 0.03 12/13/2023    BASOSABS 0.02 12/13/2023     Lab Results   Component Value Date    GLUCOSE 93 09/07/2023    BUN 16 09/07/2023    CREATININE 0.69 09/07/2023     09/07/2023    K 5.0 09/07/2023     (H) 09/07/2023    CO2 25.0 09/07/2023    CALCIUM 9.9 09/07/2023    PROTEINTOT 7.0 09/07/2023    ALBUMIN 4.6 09/07/2023    BILITOT 0.2 09/07/2023    ALKPHOS 85 09/07/2023    AST 19 09/07/2023    ALT 11 09/07/2023     Lab Results   Component Value Date    FERRITIN 52.37 12/13/2023    [id number] 269 03/07/2023    FOLATE 11.20 03/07/2023     Lab Results   Component Value Date    IRON 80 12/13/2023    LABIRON 18 (L) 12/13/2023    TRANSFERRIN 292 12/13/2023    TIBC 435 12/13/2023     Lab Results   Component Value Date    FERRITIN 52.37 12/13/2023    [id number] 269 03/07/2023    FOLATE 11.20 03/07/2023     No results found for: \"PSA\", \"CEA\", \"AFP\", \"\", \"\"    MRI Shoulder Left Without Contrast    Result Date: 9/14/2023  1. A 5 mm diameter near full-thickness supraspinatus tendon insertional tear anteriorly. 2. Large tear of the glenoid labrum with a paralabral cyst posteriorly.  This report was finalized on 9/14/2023 8:52 AM by Dr. Orestes Echols M.D.             Assessment and Plan:  Diagnoses and all orders for this visit:    1. Iron deficiency anemia, unspecified iron deficiency anemia type (Primary)  -     CBC & Differential  -     Iron Profile  -     Ferritin  -     Folate  -     Vitamin B12    2. Constipation, unspecified constipation type    May have a component of iron deficiency anemia but the iron sat is acceptable and very stable at 17 to 18%. Ferritin is normal as well. Iron levels are normal. No evidence for anemia with the hemoglobin. Iron studies today with 18% on the T sat, normal iron and normal ferritin.     I would suggest she get iron through food sources and avoid oral iron since this would likely make her constipation worsen.     In regards to her chronic " constipation, I would suggest GI evaluation to further evaluation. We did discuss hydration and to back off of the fiber supplementation to see if this lessens the constipation.     Follow up with MD in 3 months.     I spent 30 minutes caring for Amber on this date of service. This time includes time spent by me in the following activities:preparing for the visit, reviewing tests, obtaining and/or reviewing a separately obtained history, performing a medically appropriate examination and/or evaluation , counseling and educating the patient/family/caregiver, ordering medications, tests, or procedures, referring and communicating with other health care professionals , documenting information in the medical record, independently interpreting results and communicating that information with the patient/family/caregiver, and care coordination    Patient Follow Up: 3 months with MD.     Patient was given instructions and counseling regarding her condition or for health maintenance advice. Please see specific information pulled into the AVS if appropriate.     Yara Wild, APRN    12/13/2023    .tob

## 2023-12-14 DIAGNOSIS — M25.512 LEFT SHOULDER PAIN, UNSPECIFIED CHRONICITY: Primary | ICD-10-CM

## 2023-12-14 DIAGNOSIS — M75.112 NONTRAUMATIC INCOMPLETE TEAR OF LEFT ROTATOR CUFF: ICD-10-CM

## 2023-12-14 NOTE — TELEPHONE ENCOUNTER
Yes we will send in a new script. Herlinda is doing it now. Sometimes insurance also dictates what percentage of medication that they will pay for and it changes occasionally. We will write it for the Ketamine 10% specifically though. Thanks.

## 2023-12-14 NOTE — TELEPHONE ENCOUNTER
PATIENT CALLED STATING THE COMPOUND CREAM REFILL SENT IN WAS NOT CORRECT. SHE WOULD LIKE A REFILL OF THE SAME MEDICATION SHE RECEIVED THE FIRST TIME.

## 2023-12-15 DIAGNOSIS — Z98.890 S/P ENDOSCOPY: Primary | ICD-10-CM

## 2023-12-20 ENCOUNTER — TELEPHONE (OUTPATIENT)
Dept: GASTROENTEROLOGY | Facility: CLINIC | Age: 58
End: 2023-12-20
Payer: COMMERCIAL

## 2023-12-20 NOTE — TELEPHONE ENCOUNTER
Spoke to patient in regards to a referral we received from Karina Cottrell for Iron deficiency anemia /S/P endoscopy. She said she wants to see Dr. Peunte because her  and son see him . She said she told April she wanted to go to him.

## 2023-12-27 ENCOUNTER — LAB (OUTPATIENT)
Dept: LAB | Facility: HOSPITAL | Age: 58
End: 2023-12-27
Payer: COMMERCIAL

## 2023-12-27 ENCOUNTER — TRANSCRIBE ORDERS (OUTPATIENT)
Dept: LAB | Facility: HOSPITAL | Age: 58
End: 2023-12-27
Payer: COMMERCIAL

## 2023-12-27 DIAGNOSIS — I51.9 MYXEDEMA HEART DISEASE: ICD-10-CM

## 2023-12-27 DIAGNOSIS — I51.9 MYXEDEMA HEART DISEASE: Primary | ICD-10-CM

## 2023-12-27 DIAGNOSIS — E03.9 MYXEDEMA HEART DISEASE: Primary | ICD-10-CM

## 2023-12-27 DIAGNOSIS — E03.9 MYXEDEMA HEART DISEASE: ICD-10-CM

## 2023-12-27 PROCEDURE — 84481 FREE ASSAY (FT-3): CPT

## 2023-12-27 PROCEDURE — 84439 ASSAY OF FREE THYROXINE: CPT

## 2023-12-27 PROCEDURE — 84443 ASSAY THYROID STIM HORMONE: CPT

## 2023-12-27 PROCEDURE — 36415 COLL VENOUS BLD VENIPUNCTURE: CPT

## 2023-12-28 LAB
T3FREE SERPL-MCNC: 2.97 PG/ML (ref 2–4.4)
T4 FREE SERPL-MCNC: 1.76 NG/DL (ref 0.93–1.7)
TSH SERPL DL<=0.05 MIU/L-ACNC: 0.19 UIU/ML (ref 0.27–4.2)

## 2024-01-02 RX ORDER — CELECOXIB 100 MG/1
100 CAPSULE ORAL EVERY 12 HOURS
Qty: 60 CAPSULE | Refills: 2 | Status: SHIPPED | OUTPATIENT
Start: 2024-01-02

## 2024-01-09 DIAGNOSIS — M25.512 LEFT SHOULDER PAIN, UNSPECIFIED CHRONICITY: ICD-10-CM

## 2024-01-09 DIAGNOSIS — M75.112 NONTRAUMATIC INCOMPLETE TEAR OF LEFT ROTATOR CUFF: ICD-10-CM

## 2024-01-22 RX ORDER — TRAZODONE HYDROCHLORIDE 100 MG/1
100 TABLET ORAL
Qty: 90 TABLET | Refills: 0 | Status: SHIPPED | OUTPATIENT
Start: 2024-01-22

## 2024-02-12 DIAGNOSIS — R19.8 STRAINING WITH STOOLS: ICD-10-CM

## 2024-02-12 DIAGNOSIS — E78.2 MIXED HYPERLIPIDEMIA: ICD-10-CM

## 2024-02-12 DIAGNOSIS — M19.90 UNSPECIFIED OSTEOARTHRITIS, UNSPECIFIED SITE: ICD-10-CM

## 2024-02-12 DIAGNOSIS — J30.89 SEASONAL ALLERGIC RHINITIS DUE TO OTHER ALLERGIC TRIGGER: ICD-10-CM

## 2024-02-12 DIAGNOSIS — R23.2 HOT FLASHES: ICD-10-CM

## 2024-02-12 RX ORDER — CELECOXIB 100 MG/1
100 CAPSULE ORAL EVERY 12 HOURS
Qty: 30 CAPSULE | Refills: 0 | Status: SHIPPED | OUTPATIENT
Start: 2024-02-12

## 2024-02-12 RX ORDER — DOCUSATE SODIUM 100 MG/1
100 CAPSULE, LIQUID FILLED ORAL 2 TIMES DAILY
Qty: 30 CAPSULE | Refills: 0 | Status: SHIPPED | OUTPATIENT
Start: 2024-02-12

## 2024-02-12 RX ORDER — ESCITALOPRAM OXALATE 10 MG/1
10 TABLET ORAL DAILY
Qty: 14 TABLET | Refills: 0 | Status: SHIPPED | OUTPATIENT
Start: 2024-02-12

## 2024-02-12 RX ORDER — DOCUSATE CALCIUM 240 MG/1
240 CAPSULE, LIQUID FILLED ORAL DAILY
Qty: 14 CAPSULE | Refills: 0 | Status: SHIPPED | OUTPATIENT
Start: 2024-02-12

## 2024-02-12 RX ORDER — ATORVASTATIN CALCIUM 20 MG/1
20 TABLET, FILM COATED ORAL DAILY
Qty: 14 TABLET | Refills: 0 | Status: SHIPPED | OUTPATIENT
Start: 2024-02-12

## 2024-02-12 RX ORDER — LORATADINE 10 MG/1
10 TABLET ORAL DAILY
Qty: 14 TABLET | Refills: 0 | Status: SHIPPED | OUTPATIENT
Start: 2024-02-12

## 2024-02-23 DIAGNOSIS — M75.112 NONTRAUMATIC INCOMPLETE TEAR OF LEFT ROTATOR CUFF: ICD-10-CM

## 2024-02-23 DIAGNOSIS — M25.512 LEFT SHOULDER PAIN, UNSPECIFIED CHRONICITY: ICD-10-CM

## 2024-02-27 ENCOUNTER — TRANSCRIBE ORDERS (OUTPATIENT)
Dept: LAB | Facility: HOSPITAL | Age: 59
End: 2024-02-27
Payer: COMMERCIAL

## 2024-02-27 ENCOUNTER — LAB (OUTPATIENT)
Dept: LAB | Facility: HOSPITAL | Age: 59
End: 2024-02-27
Payer: COMMERCIAL

## 2024-02-27 DIAGNOSIS — I51.9 MYXEDEMA HEART DISEASE: Primary | ICD-10-CM

## 2024-02-27 DIAGNOSIS — I51.9 MYXEDEMA HEART DISEASE: ICD-10-CM

## 2024-02-27 DIAGNOSIS — E03.9 MYXEDEMA HEART DISEASE: Primary | ICD-10-CM

## 2024-02-27 DIAGNOSIS — E03.9 MYXEDEMA HEART DISEASE: ICD-10-CM

## 2024-02-27 LAB
T3FREE SERPL-MCNC: 3.59 PG/ML (ref 2–4.4)
T4 FREE SERPL-MCNC: 1.58 NG/DL (ref 0.93–1.7)
TSH SERPL DL<=0.05 MIU/L-ACNC: 0.2 UIU/ML (ref 0.27–4.2)

## 2024-02-27 PROCEDURE — 84439 ASSAY OF FREE THYROXINE: CPT

## 2024-02-27 PROCEDURE — 36415 COLL VENOUS BLD VENIPUNCTURE: CPT

## 2024-02-27 PROCEDURE — 84443 ASSAY THYROID STIM HORMONE: CPT

## 2024-02-27 PROCEDURE — 84481 FREE ASSAY (FT-3): CPT

## 2024-03-07 ENCOUNTER — TRANSCRIBE ORDERS (OUTPATIENT)
Dept: ADMINISTRATIVE | Facility: HOSPITAL | Age: 59
End: 2024-03-07
Payer: COMMERCIAL

## 2024-03-07 DIAGNOSIS — Z12.31 SCREENING MAMMOGRAM FOR BREAST CANCER: Primary | ICD-10-CM

## 2024-03-12 ENCOUNTER — OFFICE VISIT (OUTPATIENT)
Dept: FAMILY MEDICINE CLINIC | Facility: CLINIC | Age: 59
End: 2024-03-12
Payer: COMMERCIAL

## 2024-03-12 VITALS
WEIGHT: 183.4 LBS | BODY MASS INDEX: 28.79 KG/M2 | TEMPERATURE: 96.7 F | HEIGHT: 67 IN | RESPIRATION RATE: 18 BRPM | OXYGEN SATURATION: 99 % | SYSTOLIC BLOOD PRESSURE: 137 MMHG | HEART RATE: 64 BPM | DIASTOLIC BLOOD PRESSURE: 77 MMHG

## 2024-03-12 DIAGNOSIS — Z00.00 ANNUAL PHYSICAL EXAM: Primary | ICD-10-CM

## 2024-03-12 DIAGNOSIS — D64.9 ANEMIA, UNSPECIFIED TYPE: ICD-10-CM

## 2024-03-12 DIAGNOSIS — M19.90 UNSPECIFIED OSTEOARTHRITIS, UNSPECIFIED SITE: ICD-10-CM

## 2024-03-12 DIAGNOSIS — E78.2 MIXED HYPERLIPIDEMIA: ICD-10-CM

## 2024-03-12 DIAGNOSIS — R05.9 COUGH: ICD-10-CM

## 2024-03-12 DIAGNOSIS — R23.2 HOT FLASHES: ICD-10-CM

## 2024-03-12 DIAGNOSIS — J30.2 SEASONAL ALLERGIC RHINITIS, UNSPECIFIED TRIGGER: ICD-10-CM

## 2024-03-12 DIAGNOSIS — F51.01 PRIMARY INSOMNIA: ICD-10-CM

## 2024-03-12 DIAGNOSIS — M17.12 PRIMARY OSTEOARTHRITIS OF LEFT KNEE: ICD-10-CM

## 2024-03-12 DIAGNOSIS — J30.89 SEASONAL ALLERGIC RHINITIS DUE TO OTHER ALLERGIC TRIGGER: ICD-10-CM

## 2024-03-12 LAB
25(OH)D3 SERPL-MCNC: 50.5 NG/ML (ref 30–100)
ALBUMIN SERPL-MCNC: 4.4 G/DL (ref 3.5–5.2)
ALBUMIN/GLOB SERPL: 1.7 G/DL
ALP SERPL-CCNC: 77 U/L (ref 39–117)
ALT SERPL W P-5'-P-CCNC: 12 U/L (ref 1–33)
ANION GAP SERPL CALCULATED.3IONS-SCNC: 10.8 MMOL/L (ref 5–15)
AST SERPL-CCNC: 19 U/L (ref 1–32)
BASOPHILS # BLD AUTO: 0.04 10*3/MM3 (ref 0–0.2)
BASOPHILS NFR BLD AUTO: 0.6 % (ref 0–1.5)
BILIRUB SERPL-MCNC: 0.4 MG/DL (ref 0–1.2)
BUN SERPL-MCNC: 17 MG/DL (ref 6–20)
BUN/CREAT SERPL: 25.4 (ref 7–25)
CALCIUM SPEC-SCNC: 9.6 MG/DL (ref 8.6–10.5)
CHLORIDE SERPL-SCNC: 107 MMOL/L (ref 98–107)
CHOLEST SERPL-MCNC: 142 MG/DL (ref 0–200)
CO2 SERPL-SCNC: 23.2 MMOL/L (ref 22–29)
CREAT SERPL-MCNC: 0.67 MG/DL (ref 0.57–1)
DEPRECATED RDW RBC AUTO: 40.4 FL (ref 37–54)
EGFRCR SERPLBLD CKD-EPI 2021: 102.7 ML/MIN/1.73
EOSINOPHIL # BLD AUTO: 0.43 10*3/MM3 (ref 0–0.4)
EOSINOPHIL NFR BLD AUTO: 5.9 % (ref 0.3–6.2)
ERYTHROCYTE [DISTWIDTH] IN BLOOD BY AUTOMATED COUNT: 12.8 % (ref 12.3–15.4)
FERRITIN SERPL-MCNC: 40.9 NG/ML (ref 13–150)
FOLATE SERPL-MCNC: 17.1 NG/ML (ref 4.78–24.2)
GLOBULIN UR ELPH-MCNC: 2.6 GM/DL
GLUCOSE SERPL-MCNC: 90 MG/DL (ref 65–99)
HCT VFR BLD AUTO: 41 % (ref 34–46.6)
HDLC SERPL-MCNC: 44 MG/DL (ref 40–60)
HGB BLD-MCNC: 13.6 G/DL (ref 12–15.9)
IMM GRANULOCYTES # BLD AUTO: 0.02 10*3/MM3 (ref 0–0.05)
IMM GRANULOCYTES NFR BLD AUTO: 0.3 % (ref 0–0.5)
IRON 24H UR-MRATE: 56 MCG/DL (ref 37–145)
IRON SATN MFR SERPL: 13 % (ref 20–50)
LDLC SERPL CALC-MCNC: 77 MG/DL (ref 0–100)
LDLC/HDLC SERPL: 1.7 {RATIO}
LYMPHOCYTES # BLD AUTO: 1.43 10*3/MM3 (ref 0.7–3.1)
LYMPHOCYTES NFR BLD AUTO: 19.7 % (ref 19.6–45.3)
MCH RBC QN AUTO: 28.9 PG (ref 26.6–33)
MCHC RBC AUTO-ENTMCNC: 33.2 G/DL (ref 31.5–35.7)
MCV RBC AUTO: 87 FL (ref 79–97)
MONOCYTES # BLD AUTO: 0.58 10*3/MM3 (ref 0.1–0.9)
MONOCYTES NFR BLD AUTO: 8 % (ref 5–12)
NEUTROPHILS NFR BLD AUTO: 4.76 10*3/MM3 (ref 1.7–7)
NEUTROPHILS NFR BLD AUTO: 65.5 % (ref 42.7–76)
NRBC BLD AUTO-RTO: 0 /100 WBC (ref 0–0.2)
PLATELET # BLD AUTO: 281 10*3/MM3 (ref 140–450)
PMV BLD AUTO: 11.5 FL (ref 6–12)
POTASSIUM SERPL-SCNC: 4.2 MMOL/L (ref 3.5–5.2)
PROT SERPL-MCNC: 7 G/DL (ref 6–8.5)
RBC # BLD AUTO: 4.71 10*6/MM3 (ref 3.77–5.28)
SODIUM SERPL-SCNC: 141 MMOL/L (ref 136–145)
TIBC SERPL-MCNC: 420 MCG/DL (ref 298–536)
TRANSFERRIN SERPL-MCNC: 282 MG/DL (ref 200–360)
TRIGL SERPL-MCNC: 117 MG/DL (ref 0–150)
VIT B12 BLD-MCNC: 298 PG/ML (ref 211–946)
VLDLC SERPL-MCNC: 21 MG/DL (ref 5–40)
WBC NRBC COR # BLD AUTO: 7.26 10*3/MM3 (ref 3.4–10.8)

## 2024-03-12 PROCEDURE — 80061 LIPID PANEL: CPT | Performed by: NURSE PRACTITIONER

## 2024-03-12 PROCEDURE — 83540 ASSAY OF IRON: CPT | Performed by: NURSE PRACTITIONER

## 2024-03-12 PROCEDURE — 82728 ASSAY OF FERRITIN: CPT | Performed by: NURSE PRACTITIONER

## 2024-03-12 PROCEDURE — 85025 COMPLETE CBC W/AUTO DIFF WBC: CPT | Performed by: NURSE PRACTITIONER

## 2024-03-12 PROCEDURE — 80053 COMPREHEN METABOLIC PANEL: CPT | Performed by: NURSE PRACTITIONER

## 2024-03-12 PROCEDURE — 82607 VITAMIN B-12: CPT | Performed by: NURSE PRACTITIONER

## 2024-03-12 PROCEDURE — 84466 ASSAY OF TRANSFERRIN: CPT | Performed by: NURSE PRACTITIONER

## 2024-03-12 PROCEDURE — 82746 ASSAY OF FOLIC ACID SERUM: CPT | Performed by: NURSE PRACTITIONER

## 2024-03-12 PROCEDURE — 82306 VITAMIN D 25 HYDROXY: CPT | Performed by: NURSE PRACTITIONER

## 2024-03-12 RX ORDER — ESCITALOPRAM OXALATE 10 MG/1
10 TABLET ORAL DAILY
Qty: 90 TABLET | Refills: 1 | Status: SHIPPED | OUTPATIENT
Start: 2024-03-12

## 2024-03-12 RX ORDER — LORATADINE 10 MG/1
10 TABLET ORAL DAILY
Qty: 90 TABLET | Refills: 1 | Status: SHIPPED | OUTPATIENT
Start: 2024-03-12

## 2024-03-12 RX ORDER — TRAZODONE HYDROCHLORIDE 50 MG/1
25 TABLET ORAL NIGHTLY
Qty: 45 TABLET | Refills: 1 | Status: SHIPPED | OUTPATIENT
Start: 2024-03-12

## 2024-03-12 RX ORDER — ATORVASTATIN CALCIUM 20 MG/1
20 TABLET, FILM COATED ORAL DAILY
Qty: 90 TABLET | Refills: 1 | Status: SHIPPED | OUTPATIENT
Start: 2024-03-12

## 2024-03-12 RX ORDER — LEVOTHYROXINE SODIUM 200 MCG
200 TABLET ORAL
COMMUNITY
Start: 2024-03-11

## 2024-03-12 RX ORDER — CELECOXIB 100 MG/1
100 CAPSULE ORAL 2 TIMES DAILY
Qty: 180 CAPSULE | Refills: 1 | Status: SHIPPED | OUTPATIENT
Start: 2024-03-12

## 2024-03-12 RX ORDER — BENZONATATE 200 MG/1
200 CAPSULE ORAL 3 TIMES DAILY PRN
Qty: 30 CAPSULE | Refills: 0 | Status: SHIPPED | OUTPATIENT
Start: 2024-03-12

## 2024-03-12 RX ORDER — TRAZODONE HYDROCHLORIDE 50 MG/1
25 TABLET ORAL NIGHTLY
Status: CANCELLED | OUTPATIENT
Start: 2024-03-12

## 2024-03-12 RX ORDER — MONTELUKAST SODIUM 10 MG/1
10 TABLET ORAL
Qty: 90 TABLET | Refills: 1 | Status: SHIPPED | OUTPATIENT
Start: 2024-03-12

## 2024-03-12 NOTE — PROGRESS NOTES
Chief Complaint  Hyperlipidemia and Depression    Subjective          Amber Poole presents to [facility]  History of Present Illness  Here for check up:  Dentist: She goes to the dentist.  Eye: She goes to the eye doctor  Mammo: Mammogram is scheduled  Pap: Pap is scheduled for March 21 with Dr. Rain  Family hx: No change in history  Dexa: Will defer to GYN  Colonoscopy: Due 11/20/2033    Anemia:   Her iron was stable with her last set of labs.  She does take her Vit D.  She is doing her colace daily but is feeling like she may need to increase on the colace.  .  Hot Flashes:  The lexapro is doing good and she is still doing the hormones.     Allergies:  She is taking her claritin and singular.  Arthritis:  She is doing good overall.     Insomnia: She is doing well with her trazodone.  She takes 1/4 is sleeping better. She would like to get a smaller dose  She has been doing her walking in the office.  She kept the wt off.  She is doing ok but has changed diet.    Thyroid:  managed by endo  Cough: She would like Tessalon Perles.  She has had them in the past and she just needs them every now and then.  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  Depression: Not at risk (12/13/2023)    PHQ-2     PHQ-2 Score: 0    and 12/13/2023               Allergies  Codeine and Penicillins    Social History     Tobacco Use    Smoking status: Never    Smokeless tobacco: Never   Vaping Use    Vaping status: Never Used   Substance Use Topics    Alcohol use: Never    Drug use: Never       Family History   Problem Relation Age of Onset    Diabetes Mother     Kidney nephrosis Mother     Heart disease Father     Vision loss Father     Macular degeneration Father     Macular degeneration Sister     Cancer Daughter     Leukemia Daughter     Leukemia Son     Kidney nephrosis Maternal Grandmother     Other Maternal  "Grandmother         BLADDER CALCULUS    Liver cancer Other         UNSPECIFIED    Lung cancer Other         UNSPECIFIED    Malig Hyperthermia Neg Hx         Health Maintenance Due   Topic Date Due    ZOSTER VACCINE (1 of 2) Never done    ANNUAL PHYSICAL  Never done    COVID-19 Vaccine (1 - 2023-24 season) Never done        Immunization History   Administered Date(s) Administered    Influenza, Unspecified 10/01/2020    Tdap 08/06/2018       Review of Systems   Constitutional:  Negative for fatigue.   Respiratory:  Negative for cough and shortness of breath.    Cardiovascular:  Negative for chest pain.   Gastrointestinal:  Negative for diarrhea, nausea and vomiting.        Objective       Vitals:    03/12/24 0858   BP: 137/77   Pulse: 64   Resp: 18   Temp: 96.7 °F (35.9 °C)   SpO2: 99%   Weight: 83.2 kg (183 lb 6.4 oz)   Height: 170.2 cm (67\")       Body mass index is 28.72 kg/m².         Physical Exam  Vitals and nursing note reviewed.   Constitutional:       General: She is not in acute distress.     Appearance: Normal appearance. She is not ill-appearing.   HENT:      Right Ear: Tympanic membrane and ear canal normal.      Left Ear: Tympanic membrane and ear canal normal.      Nose: No congestion or rhinorrhea.      Mouth/Throat:      Pharynx: No oropharyngeal exudate or posterior oropharyngeal erythema.   Eyes:      Conjunctiva/sclera: Conjunctivae normal.   Neck:      Vascular: No carotid bruit.   Cardiovascular:      Rate and Rhythm: Normal rate and regular rhythm.      Heart sounds: No murmur heard.  Pulmonary:      Effort: Pulmonary effort is normal.      Breath sounds: Normal breath sounds. No wheezing or rhonchi.   Abdominal:      General: Bowel sounds are normal.      Palpations: Abdomen is soft.   Musculoskeletal:      Cervical back: No tenderness.      Right lower leg: No edema.      Left lower leg: No edema.   Skin:     Findings: No bruising or rash.   Neurological:      General: No focal deficit " present.      Mental Status: She is alert and oriented to person, place, and time. Mental status is at baseline.   Psychiatric:         Mood and Affect: Mood normal.         Behavior: Behavior normal.         Thought Content: Thought content normal.         Judgment: Judgment normal.             Result Review :     The following data was reviewed by: SHAILESH Colvin on 03/12/2024:    Common Labs   Common labs          10/26/2023    07:36 12/13/2023    11:02 3/12/2024    09:37   Common Labs   Glucose   90    BUN   17    Creatinine   0.67    Sodium   141    Potassium   4.2    Chloride   107    Calcium   9.6    Albumin   4.4    Total Bilirubin   0.4    Alkaline Phosphatase   77    AST (SGOT)   19    ALT (SGPT)   12    WBC 6.93  9.50  7.26    Hemoglobin 13.7  13.3  13.6    Hematocrit 40.9  41.0  41.0    Platelets 304  313  281    Total Cholesterol   142    Triglycerides   117    HDL Cholesterol   44    LDL Cholesterol    77                     Assessment and Plan      Diagnoses and all orders for this visit:    1. Annual physical exam (Primary)  -     Comprehensive Metabolic Panel  -     CBC & Differential  -     Lipid Panel    2. Cough  -     montelukast (SINGULAIR) 10 MG tablet; Take 1 tablet by mouth every night at bedtime.  Dispense: 90 tablet; Refill: 1  -     benzonatate (TESSALON) 200 MG capsule; Take 1 capsule by mouth 3 (Three) Times a Day As Needed for Cough.  Dispense: 30 capsule; Refill: 0    3. Seasonal allergic rhinitis, unspecified trigger  -     montelukast (SINGULAIR) 10 MG tablet; Take 1 tablet by mouth every night at bedtime.  Dispense: 90 tablet; Refill: 1    4. Seasonal allergic rhinitis due to other allergic trigger  -     loratadine (CLARITIN) 10 MG tablet; Take 1 tablet by mouth Daily.  Dispense: 90 tablet; Refill: 1    5. Hot flashes  -     escitalopram (LEXAPRO) 10 MG tablet; Take 1 tablet by mouth Daily.  Dispense: 90 tablet; Refill: 1    6. Unspecified osteoarthritis, unspecified  site  -     Diclofenac Sodium (VOLTAREN) 1 % gel gel; Apply  topically to the appropriate area as directed 4 (Four) Times a Day.  Dispense: 100 g; Refill: 5    7. Mixed hyperlipidemia  -     atorvastatin (LIPITOR) 20 MG tablet; Take 1 tablet by mouth Daily.  Dispense: 90 tablet; Refill: 1    8. Primary insomnia  -     traZODone (DESYREL) 50 MG tablet; Take 0.5 tablets by mouth Every Night.  Dispense: 45 tablet; Refill: 1    9. Primary osteoarthritis of left knee  -     celecoxib (CeleBREX) 100 MG capsule; Take 1 capsule by mouth 2 (Two) Times a Day.  Dispense: 180 capsule; Refill: 1    10. Anemia, unspecified type  -     Iron Profile  -     Ferritin  -     Vitamin B12 & Folate  -     Vitamin D,25-Hydroxy            Follow Up     Return in about 6 months (around 9/12/2024).  ADVICE WAS GIVEN RE:  ALCOHOL USE, SEATBELT USE, REGULAR EXERCISE, HEALTHY DIET, ROUTINE EYE AND DENTAL EXAMS  Follow-up in 6 months for labs and appt. Call with any concerns or questions that you may have regarding your medications or history.    I have reviewed all medications and at this time no medications changes need to be adjusted for all chronic conditions.    Patient was given instructions and counseling regarding her condition or for health maintenance advice. Please see specific information pulled into the AVS if appropriate.     Parts of this note are electronic transcriptions/translations of spoken language to printed text using the Dragon Dictation system.          SHAILESH Colvin  03/12/2024  Answers submitted by the patient for this visit:  Other (Submitted on 3/5/2024)  Please describe your symptoms.: Med refills and labs  Have you had these symptoms before?: Yes  How long have you been having these symptoms?: Greater than 2 weeks  Primary Reason for Visit (Submitted on 3/5/2024)  What is the primary reason for your visit?: Other

## 2024-03-15 ENCOUNTER — PATIENT ROUNDING (BHMG ONLY) (OUTPATIENT)
Dept: FAMILY MEDICINE CLINIC | Facility: CLINIC | Age: 59
End: 2024-03-15
Payer: COMMERCIAL

## 2024-03-18 ENCOUNTER — OFFICE VISIT (OUTPATIENT)
Dept: ORTHOPEDIC SURGERY | Facility: CLINIC | Age: 59
End: 2024-03-18
Payer: COMMERCIAL

## 2024-03-18 VITALS
OXYGEN SATURATION: 95 % | SYSTOLIC BLOOD PRESSURE: 123 MMHG | DIASTOLIC BLOOD PRESSURE: 80 MMHG | BODY MASS INDEX: 28.72 KG/M2 | HEIGHT: 67 IN | HEART RATE: 71 BPM | WEIGHT: 183 LBS

## 2024-03-18 DIAGNOSIS — M25.512 CHRONIC LEFT SHOULDER PAIN: Primary | ICD-10-CM

## 2024-03-18 DIAGNOSIS — G89.29 CHRONIC LEFT SHOULDER PAIN: Primary | ICD-10-CM

## 2024-03-18 PROCEDURE — 20610 DRAIN/INJ JOINT/BURSA W/O US: CPT

## 2024-03-18 RX ORDER — LIDOCAINE HYDROCHLORIDE 10 MG/ML
5 INJECTION, SOLUTION EPIDURAL; INFILTRATION; INTRACAUDAL; PERINEURAL
Status: COMPLETED | OUTPATIENT
Start: 2024-03-18 | End: 2024-03-18

## 2024-03-18 RX ORDER — TRIAMCINOLONE ACETONIDE 40 MG/ML
40 INJECTION, SUSPENSION INTRA-ARTICULAR; INTRAMUSCULAR
Status: COMPLETED | OUTPATIENT
Start: 2024-03-18 | End: 2024-03-18

## 2024-03-18 RX ADMIN — LIDOCAINE HYDROCHLORIDE 5 ML: 10 INJECTION, SOLUTION EPIDURAL; INFILTRATION; INTRACAUDAL; PERINEURAL at 11:10

## 2024-03-18 RX ADMIN — TRIAMCINOLONE ACETONIDE 40 MG: 40 INJECTION, SUSPENSION INTRA-ARTICULAR; INTRAMUSCULAR at 11:10

## 2024-03-18 NOTE — PROGRESS NOTES
"Chief Complaint  Follow-up of the Left Shoulder    Subjective      Amber Poole presents to [facility] for follow-up of left shoulder pain.  Patient has a chronic rotator cuff tear that was identified on MRI on 9/13.  Patient has been managing conservatively with intermittent injections.  Reports that her last injection on 12/11 lasted up until 2 weeks ago and she is beginning to have increased pain.  She is here today to receive another steroid injection.  Reports that she has pain at the joint line with backward motions.    Objective   Allergies   Allergen Reactions    Codeine Anaphylaxis and Rash    Penicillins Rash       Vital Signs:   /80   Pulse 71   Ht 170.2 cm (67\")   Wt 83 kg (183 lb)   SpO2 95%   BMI 28.66 kg/m²       Physical Exam    Constitutional: Awake, alert. Well nourished appearance.    Integumentary: Warm, dry, intact. No obvious rashes.    HENT: Atraumatic, normocephalic.   Respiratory: Non labored respirations .   Cardiovascular: Intact peripheral pulses.    Psychiatric: Normal mood and affect. A&O X3    Ortho Exam  Left shoulder: Active range of motion for shoulder flexion 175, abduction 160, internal rotation T12, external rotation 60 degrees.  Positive impingement sign.  Neurovascular intact.  Sensation is intact.  Full range of motion of the elbow and the wrist.    Imaging Results (Most Recent)       None             Large Joint: L glenohumeral  Date/Time: 3/18/2024 11:10 AM  Consent given by: patient  Site marked: site marked  Timeout: Immediately prior to procedure a time out was called to verify the correct patient, procedure, equipment, support staff and site/side marked as required   Supporting Documentation  Indications: pain   Procedure Details  Location: shoulder - L glenohumeral  Preparation: Patient was prepped and draped in the usual sterile fashion  Needle gauge: 21g.  Medications administered: 5 mL lidocaine PF 1% 1 %; 40 mg " triamcinolone acetonide 40 MG/ML  Patient tolerance: patient tolerated the procedure well with no immediate complications              Assessment and Plan   Problem List Items Addressed This Visit    None  Visit Diagnoses       Chronic left shoulder pain    -  Primary    Relevant Orders    Large Joint: L glenohumeral            Follow Up   Return in about 3 months (around 6/18/2024).    Patient is a non-smoker, did not discuss options for smoking cessation.     Social History     Socioeconomic History    Marital status:    Tobacco Use    Smoking status: Never    Smokeless tobacco: Never   Vaping Use    Vaping status: Never Used   Substance and Sexual Activity    Alcohol use: Never    Drug use: Never    Sexual activity: Defer     Birth control/protection: Tubal ligation       Patient Instructions   Left shoulder Steroid injection administered in office today and tolerated the procedure well without complications.  Patient advised on 3 months duration between injections.  Patient is not a diabetic.    Follow-up in 3 months for repeat injection if needed.  Call with questions, concerns or worsening symptoms.   Patient was given instructions and counseling regarding her condition or for health maintenance advice. Please see specific information pulled into the AVS if appropriate.

## 2024-05-20 ENCOUNTER — OFFICE VISIT (OUTPATIENT)
Dept: ORTHOPEDIC SURGERY | Facility: CLINIC | Age: 59
End: 2024-05-20
Payer: COMMERCIAL

## 2024-05-20 VITALS — HEIGHT: 67 IN | BODY MASS INDEX: 28.72 KG/M2 | WEIGHT: 183 LBS

## 2024-05-20 DIAGNOSIS — Z96.653 HX OF TOTAL KNEE ARTHROPLASTY, BILATERAL: ICD-10-CM

## 2024-05-20 DIAGNOSIS — M25.561 RIGHT KNEE PAIN, UNSPECIFIED CHRONICITY: Primary | ICD-10-CM

## 2024-05-20 DIAGNOSIS — M25.562 LEFT KNEE PAIN, UNSPECIFIED CHRONICITY: ICD-10-CM

## 2024-05-20 NOTE — PROGRESS NOTES
"Chief Complaint  Follow-up and Pain of the Right Knee and Follow-up and Pain of the Left Knee    Subjective      Amber Poole presents to [facility] for annual follow-up of bilateral total knee arthroplasty with Cassidy wilson with Dr. Swartz on 5/26/2023.  Patient reports that she has some aching with weather changes, however she has no issues with the knee and has been able to resume activities normally without difficulty.  She is ambulatory without use of assistive devices.    Objective   Allergies   Allergen Reactions    Codeine Anaphylaxis and Rash    Penicillins Rash       Vital Signs:   Ht 170.2 cm (67\")   Wt 83 kg (183 lb)   BMI 28.66 kg/m²       Physical Exam    Constitutional: Awake, alert. Well nourished appearance.    Integumentary: Warm, dry, intact. No obvious rashes.    HENT: Atraumatic, normocephalic.   Respiratory: Non labored respirations .   Cardiovascular: Intact peripheral pulses.    Psychiatric: Normal mood and affect. A&O X3    Ortho Exam  Bilateral knees: Incisions are completely healed well-approximated.  No edema noted.  Range of motion of the left knee with 0 to 125 degrees, right knee range of motion 0 to 120 degrees.  Stable to varus and valgus stress.  Stable to anterior posterior drawer test.  Neurovascular intact.  Sensation is intact.    Imaging Results (Most Recent)       Procedure Component Value Units Date/Time    XR Knee 3 View Left [[id number]] Resulted: 05/20/24 0938     Updated: 05/20/24 0938    Narrative:      X-Ray Report:  Study: X-rays ordered, taken in the office, and reviewed today.   Site: Left knee xray  Indication: Left TKA  View: AP/Lateral, Standing, and [place] view(s)  Findings: Intact left total knee arthroplasty. No evidence of hardware   malfunction or periprosthetic fractures.  Patella centrally tracking.   Prior studies available for comparison: yes     XR Knee 3 View Right [[id number]] Resulted: 05/20/24 0938     Updated: " 05/20/24 0938    Narrative:      X-Ray Report:  Study: X-rays ordered, taken in the office, and reviewed today.   Site: Right knee xray  Indication: Right total knee arthroplasty  View: AP/Lateral, Standing, and [place] view(s)  Findings: Intact right TKA. No evidence of hardware malfunction or   periprosthetic fractures.  Patella centrally tracking.   Prior studies available for comparison: yes                       Assessment and Plan   Problem List Items Addressed This Visit    None  Visit Diagnoses       Right knee pain, unspecified chronicity    -  Primary    Relevant Orders    XR Knee 3 View Right (Completed)    Left knee pain, unspecified chronicity        Relevant Orders    XR Knee 3 View Left (Completed)    Hx of total knee arthroplasty, bilateral                Follow Up   Return if symptoms worsen or fail to improve.    Patient is a non-smoker, did not discuss options for smoking cessation.     Social History     Socioeconomic History    Marital status:    Tobacco Use    Smoking status: Never    Smokeless tobacco: Never   Vaping Use    Vaping status: Never Used   Substance and Sexual Activity    Alcohol use: Never    Drug use: Never    Sexual activity: Defer     Birth control/protection: Tubal ligation       Patient Instructions   X-rays taken and reviewed with patient.  Hardware is intact.    Advised patient to gradually resume normal activities as tolerated.  Advised on dental prophylaxis.  Advised on 2 to 3-year follow-up with repeat x-rays.      Patient may follow-up as needed.  Call with questions, concerns or worsening symptoms.   Patient was given instructions and counseling regarding her condition or for health maintenance advice. Please see specific information pulled into the AVS if appropriate.

## 2024-05-20 NOTE — PATIENT INSTRUCTIONS
X-rays taken and reviewed with patient.  Hardware is intact.    Advised patient to gradually resume normal activities as tolerated.  Advised on dental prophylaxis.  Advised on 2 to 3-year follow-up with repeat x-rays.      Patient may follow-up as needed.  Call with questions, concerns or worsening symptoms.

## 2024-05-22 ENCOUNTER — TELEPHONE (OUTPATIENT)
Dept: ORTHOPEDIC SURGERY | Facility: CLINIC | Age: 59
End: 2024-05-22
Payer: COMMERCIAL

## 2024-05-22 NOTE — TELEPHONE ENCOUNTER
VERBAL APPROVAL GIVEN TO REFILL PATIENT'S COMPOUND PAIN CREAM WITH 3 ADDITIONAL REFILLS. SPOKE WITH KAYLYN, PHARMACIST WITH [facility].

## 2024-05-24 ENCOUNTER — TRANSCRIBE ORDERS (OUTPATIENT)
Dept: LAB | Facility: HOSPITAL | Age: 59
End: 2024-05-24
Payer: COMMERCIAL

## 2024-05-24 ENCOUNTER — LAB (OUTPATIENT)
Dept: LAB | Facility: HOSPITAL | Age: 59
End: 2024-05-24
Payer: COMMERCIAL

## 2024-05-24 DIAGNOSIS — I51.9 MYXEDEMA HEART DISEASE: Primary | ICD-10-CM

## 2024-05-24 DIAGNOSIS — R19.8 STRAINING WITH STOOLS: ICD-10-CM

## 2024-05-24 DIAGNOSIS — E03.9 MYXEDEMA HEART DISEASE: ICD-10-CM

## 2024-05-24 DIAGNOSIS — E03.9 MYXEDEMA HEART DISEASE: Primary | ICD-10-CM

## 2024-05-24 DIAGNOSIS — I51.9 MYXEDEMA HEART DISEASE: ICD-10-CM

## 2024-05-24 LAB
T3FREE SERPL-MCNC: 3.69 PG/ML (ref 2–4.4)
T4 FREE SERPL-MCNC: 1.55 NG/DL (ref 0.93–1.7)
TSH SERPL DL<=0.05 MIU/L-ACNC: 0.1 UIU/ML (ref 0.27–4.2)

## 2024-05-24 PROCEDURE — 84443 ASSAY THYROID STIM HORMONE: CPT

## 2024-05-24 PROCEDURE — 84481 FREE ASSAY (FT-3): CPT

## 2024-05-24 PROCEDURE — 84439 ASSAY OF FREE THYROXINE: CPT

## 2024-05-24 PROCEDURE — 36415 COLL VENOUS BLD VENIPUNCTURE: CPT

## 2024-05-24 RX ORDER — DOCUSATE SODIUM 100 MG/1
100 CAPSULE, LIQUID FILLED ORAL 2 TIMES DAILY
Qty: 30 CAPSULE | Refills: 0 | OUTPATIENT
Start: 2024-05-24

## 2024-05-24 RX ORDER — DOCUSATE CALCIUM 240 MG/1
240 CAPSULE, LIQUID FILLED ORAL DAILY
Qty: 14 CAPSULE | Refills: 0 | OUTPATIENT
Start: 2024-05-24

## 2024-05-30 DIAGNOSIS — R19.8 STRAINING WITH STOOLS: ICD-10-CM

## 2024-05-30 RX ORDER — DOCUSATE SODIUM 100 MG/1
100 CAPSULE, LIQUID FILLED ORAL 2 TIMES DAILY
Qty: 30 CAPSULE | Refills: 0 | OUTPATIENT
Start: 2024-05-30

## 2024-05-30 RX ORDER — DOCUSATE CALCIUM 240 MG/1
240 CAPSULE, LIQUID FILLED ORAL DAILY
Qty: 14 CAPSULE | Refills: 0 | OUTPATIENT
Start: 2024-05-30

## 2024-06-04 ENCOUNTER — OFFICE VISIT (OUTPATIENT)
Dept: FAMILY MEDICINE CLINIC | Facility: CLINIC | Age: 59
End: 2024-06-04
Payer: COMMERCIAL

## 2024-06-04 VITALS
DIASTOLIC BLOOD PRESSURE: 80 MMHG | OXYGEN SATURATION: 97 % | WEIGHT: 180.2 LBS | TEMPERATURE: 97.8 F | RESPIRATION RATE: 18 BRPM | HEART RATE: 95 BPM | BODY MASS INDEX: 28.28 KG/M2 | SYSTOLIC BLOOD PRESSURE: 130 MMHG | HEIGHT: 67 IN

## 2024-06-04 DIAGNOSIS — J02.9 SORE THROAT: Primary | ICD-10-CM

## 2024-06-04 LAB
ANION GAP SERPL CALCULATED.3IONS-SCNC: 12 MMOL/L (ref 5–15)
BASOPHILS # BLD AUTO: 0.04 10*3/MM3 (ref 0–0.2)
BASOPHILS NFR BLD AUTO: 0.4 % (ref 0–1.5)
BUN SERPL-MCNC: 10 MG/DL (ref 6–20)
BUN/CREAT SERPL: 15.4 (ref 7–25)
CALCIUM SPEC-SCNC: 9.8 MG/DL (ref 8.6–10.5)
CHLORIDE SERPL-SCNC: 106 MMOL/L (ref 98–107)
CO2 SERPL-SCNC: 22 MMOL/L (ref 22–29)
CREAT SERPL-MCNC: 0.65 MG/DL (ref 0.57–1)
DEPRECATED RDW RBC AUTO: 36.5 FL (ref 37–54)
EGFRCR SERPLBLD CKD-EPI 2021: 103.5 ML/MIN/1.73
EOSINOPHIL # BLD AUTO: 0.39 10*3/MM3 (ref 0–0.4)
EOSINOPHIL NFR BLD AUTO: 3.7 % (ref 0.3–6.2)
ERYTHROCYTE [DISTWIDTH] IN BLOOD BY AUTOMATED COUNT: 11.8 % (ref 12.3–15.4)
EXPIRATION DATE: NORMAL
GLUCOSE SERPL-MCNC: 137 MG/DL (ref 65–99)
HCT VFR BLD AUTO: 43.5 % (ref 34–46.6)
HETEROPH AB SER QL LA: NEGATIVE
HGB BLD-MCNC: 14.6 G/DL (ref 12–15.9)
IMM GRANULOCYTES # BLD AUTO: 0.04 10*3/MM3 (ref 0–0.05)
IMM GRANULOCYTES NFR BLD AUTO: 0.4 % (ref 0–0.5)
INTERNAL CONTROL: NORMAL
LYMPHOCYTES # BLD AUTO: 1.23 10*3/MM3 (ref 0.7–3.1)
LYMPHOCYTES NFR BLD AUTO: 11.7 % (ref 19.6–45.3)
Lab: NORMAL
MCH RBC QN AUTO: 28.8 PG (ref 26.6–33)
MCHC RBC AUTO-ENTMCNC: 33.6 G/DL (ref 31.5–35.7)
MCV RBC AUTO: 85.8 FL (ref 79–97)
MONOCYTES # BLD AUTO: 0.68 10*3/MM3 (ref 0.1–0.9)
MONOCYTES NFR BLD AUTO: 6.5 % (ref 5–12)
NEUTROPHILS NFR BLD AUTO: 77.3 % (ref 42.7–76)
NEUTROPHILS NFR BLD AUTO: 8.13 10*3/MM3 (ref 1.7–7)
NRBC BLD AUTO-RTO: 0 /100 WBC (ref 0–0.2)
PLATELET # BLD AUTO: 305 10*3/MM3 (ref 140–450)
PMV BLD AUTO: 11.1 FL (ref 6–12)
POTASSIUM SERPL-SCNC: 3.8 MMOL/L (ref 3.5–5.2)
RBC # BLD AUTO: 5.07 10*6/MM3 (ref 3.77–5.28)
S PYO AG THROAT QL: NEGATIVE
SODIUM SERPL-SCNC: 140 MMOL/L (ref 136–145)
WBC NRBC COR # BLD AUTO: 10.51 10*3/MM3 (ref 3.4–10.8)

## 2024-06-04 PROCEDURE — 86308 HETEROPHILE ANTIBODY SCREEN: CPT | Performed by: NURSE PRACTITIONER

## 2024-06-04 PROCEDURE — 80048 BASIC METABOLIC PNL TOTAL CA: CPT | Performed by: NURSE PRACTITIONER

## 2024-06-04 PROCEDURE — 85025 COMPLETE CBC W/AUTO DIFF WBC: CPT | Performed by: NURSE PRACTITIONER

## 2024-06-04 PROCEDURE — 87205 SMEAR GRAM STAIN: CPT | Performed by: NURSE PRACTITIONER

## 2024-06-04 PROCEDURE — 99214 OFFICE O/P EST MOD 30 MIN: CPT | Performed by: NURSE PRACTITIONER

## 2024-06-04 PROCEDURE — 87070 CULTURE OTHR SPECIMN AEROBIC: CPT | Performed by: NURSE PRACTITIONER

## 2024-06-04 PROCEDURE — 87880 STREP A ASSAY W/OPTIC: CPT | Performed by: NURSE PRACTITIONER

## 2024-06-04 RX ORDER — CLINDAMYCIN HYDROCHLORIDE 300 MG/1
300 CAPSULE ORAL 2 TIMES DAILY
Qty: 20 CAPSULE | Refills: 0 | Status: SHIPPED | OUTPATIENT
Start: 2024-06-04 | End: 2024-06-14

## 2024-06-04 RX ORDER — PREDNISONE 50 MG/1
50 TABLET ORAL DAILY
Qty: 5 TABLET | Refills: 0 | Status: SHIPPED | OUTPATIENT
Start: 2024-06-04

## 2024-06-04 NOTE — PROGRESS NOTES
"Chief Complaint  Sore Throat (Hurts to swollen) and Sinusitis (Drainage left side throat)    Subjective          Amber Poole presents to [facility]  History of Present Illness  She went to urgent care and was given a steroid shot and this was Sunday she said they tested her for strep and she was negative but she has had increased soreness on the right-hand side with a lot of mucus on the left-hand side.  She has not had a fever but she is got a lot of sinus pressure and headache on the right side.  She said her right jawline hurts but no chest pain.  She cannot hardly drink but she said it \"feels like a knife in her throat\" she tried some Magic mouthwash but that did not help.  She has been able to take a couple bites of ice cream.    I did review her [place] chart.    Allergies  Codeine and Penicillins    Social History     Tobacco Use    Smoking status: Never    Smokeless tobacco: Never   Vaping Use    Vaping status: Never Used   Substance Use Topics    Alcohol use: Never    Drug use: Never       Family History   Problem Relation Age of Onset    Diabetes Mother     Kidney nephrosis Mother     Heart disease Father     Vision loss Father     Macular degeneration Father     Macular degeneration Sister     Cancer Daughter     Leukemia Daughter     Leukemia Son     Kidney nephrosis Maternal Grandmother     Other Maternal Grandmother         BLADDER CALCULUS    Liver cancer Other         UNSPECIFIED    Lung cancer Other         UNSPECIFIED    Malig Hyperthermia Neg Hx         There are no preventive care reminders to display for this patient.     Immunization History   Administered Date(s) Administered    Influenza, Unspecified 10/01/2020    Tdap 08/06/2018       Review of Systems   HENT:  Positive for sore throat, swollen glands and trouble swallowing. Negative for congestion, drooling and ear pain.    Respiratory:  Negative for cough and shortness of breath.  " "  Gastrointestinal:  Negative for abdominal pain, diarrhea and vomiting.   Musculoskeletal:  Positive for neck pain.        Objective       Vitals:    06/04/24 1112   BP: 130/80   Pulse: 95   Resp: 18   Temp: 97.8 °F (36.6 °C)   SpO2: 97%   Weight: 81.7 kg (180 lb 3.2 oz)   Height: 170.2 cm (67\")       Body mass index is 28.22 kg/m².         Physical Exam  Vitals reviewed.   Constitutional:       Appearance: Normal appearance. She is well-developed.   HENT:      Right Ear: Tympanic membrane and ear canal normal. There is no impacted cerumen.      Left Ear: Tympanic membrane and ear canal normal. There is no impacted cerumen.      Nose: Congestion and rhinorrhea present.      Mouth/Throat:      Pharynx: Posterior oropharyngeal erythema present. No oropharyngeal exudate.   Eyes:      General: No scleral icterus.        Right eye: No discharge.         Left eye: No discharge.      Conjunctiva/sclera: Conjunctivae normal.   Cardiovascular:      Rate and Rhythm: Normal rate and regular rhythm.      Heart sounds: Normal heart sounds. No murmur heard.  Pulmonary:      Effort: Pulmonary effort is normal.      Breath sounds: Normal breath sounds.   Neurological:      Mental Status: She is alert and oriented to person, place, and time.      Cranial Nerves: No cranial nerve deficit.      Motor: No weakness.   Psychiatric:         Mood and Affect: Mood and affect normal.               Result Review :     The following data was reviewed by: SHAILESH Colvin on 06/04/2024:    RAPIDSTREP   Strep          6/4/2024    11:25   Common Labs   POC Strep A, Molecular Negative            No Images in the past 120 days found..              Assessment and Plan      Assessment & Plan  Sore throat  I will go ahead and treat with antibiotics and also a 5-day course of steroids.  We will send for culture.  We will also do blood work.  Discussed that if she is unable to eat and drink she does need to think about going to the ER for " further evaluation.    Orders Placed This Encounter   Procedures    Wound Culture - Wound, Oropharynx    Mononucleosis Screen    CBC Auto Differential    Basic Metabolic Panel    POCT rapid strep A     New Medications Ordered This Visit   Medications    clindamycin (Cleocin) 300 MG capsule     Sig: Take 1 capsule by mouth 2 (Two) Times a Day for 10 days.     Dispense:  20 capsule     Refill:  0    predniSONE (DELTASONE) 50 MG tablet     Sig: Take 1 tablet by mouth Daily.     Dispense:  5 tablet     Refill:  0          Diagnosis Plan   1. Sore throat  POCT rapid strep A    Mononucleosis Screen    CBC Auto Differential    Basic Metabolic Panel    Wound Culture - Wound, Oropharynx    clindamycin (Cleocin) 300 MG capsule    predniSONE (DELTASONE) 50 MG tablet                Follow Up     Return if symptoms worsen or fail to improve.    Patient was given instructions and counseling regarding her condition or for health maintenance advice. Please see specific information pulled into the AVS if appropriate.     Parts of this note are electronic transcriptions/translations of spoken language to printed text using the Dragon Dictation system.          Faith Ramos, SHAILESH  06/04/2024  Answers submitted by the patient for this visit:  Primary Reason for Visit (Submitted on 6/3/2024)  What is the primary reason for your visit?: Sore Throat  Sore Throat Questionnaire (Submitted on 6/3/2024)  Chief Complaint: Sore throat  drainage: No

## 2024-06-06 LAB
BACTERIA SPEC AEROBE CULT: NORMAL
GRAM STN SPEC: NORMAL
GRAM STN SPEC: NORMAL

## 2024-06-10 ENCOUNTER — TELEPHONE (OUTPATIENT)
Dept: ONCOLOGY | Facility: HOSPITAL | Age: 59
End: 2024-06-10
Payer: COMMERCIAL

## 2024-06-10 NOTE — TELEPHONE ENCOUNTER
----- Message from [facility] sent at 6/9/2024  9:21 AM EDT -----  Regarding: Appointment canceled  Contact: [phone]  Appointment canceled for Amber Eve Poole ([id number])  Visit Type: FOLLOW UP 2  Date        Time      Length    Provider                  Department  6/13/2024    8:15 AM  15 mins.  Dayday Marr             [facility] [place] [facility] [place]    Reason for Cancellation: Other

## 2024-06-26 ENCOUNTER — OFFICE VISIT (OUTPATIENT)
Dept: ORTHOPEDIC SURGERY | Facility: CLINIC | Age: 59
End: 2024-06-26
Payer: COMMERCIAL

## 2024-06-26 VITALS
DIASTOLIC BLOOD PRESSURE: 93 MMHG | OXYGEN SATURATION: 96 % | WEIGHT: 180.12 LBS | HEIGHT: 67 IN | HEART RATE: 68 BPM | SYSTOLIC BLOOD PRESSURE: 147 MMHG | BODY MASS INDEX: 28.27 KG/M2

## 2024-06-26 DIAGNOSIS — M75.42 IMPINGEMENT SYNDROME OF LEFT SHOULDER: ICD-10-CM

## 2024-06-26 DIAGNOSIS — M25.512 LEFT SHOULDER PAIN, UNSPECIFIED CHRONICITY: Primary | ICD-10-CM

## 2024-06-26 PROCEDURE — 20610 DRAIN/INJ JOINT/BURSA W/O US: CPT

## 2024-06-26 RX ORDER — AMANTADINE HYDROCHLORIDE 100 MG/1
TABLET ORAL
COMMUNITY
Start: 2024-06-14

## 2024-06-26 RX ORDER — TRIAMCINOLONE ACETONIDE 40 MG/ML
40 INJECTION, SUSPENSION INTRA-ARTICULAR; INTRAMUSCULAR
Status: COMPLETED | OUTPATIENT
Start: 2024-06-26 | End: 2024-06-26

## 2024-06-26 RX ORDER — PSEUDOEPHEDRINE HCL 30 MG
100 TABLET ORAL
COMMUNITY
Start: 2024-02-29

## 2024-06-26 RX ORDER — LEVOTHYROXINE SODIUM 175 MCG
175 TABLET ORAL DAILY
COMMUNITY

## 2024-06-26 RX ORDER — LIDOCAINE HYDROCHLORIDE 20 MG/ML
SOLUTION OROPHARYNGEAL
COMMUNITY
Start: 2024-06-02

## 2024-06-26 RX ORDER — LIDOCAINE HYDROCHLORIDE 10 MG/ML
5 INJECTION, SOLUTION INFILTRATION; PERINEURAL
Status: COMPLETED | OUTPATIENT
Start: 2024-06-26 | End: 2024-06-26

## 2024-06-26 RX ADMIN — LIDOCAINE HYDROCHLORIDE 5 ML: 10 INJECTION, SOLUTION INFILTRATION; PERINEURAL at 09:05

## 2024-06-26 RX ADMIN — TRIAMCINOLONE ACETONIDE 40 MG: 40 INJECTION, SUSPENSION INTRA-ARTICULAR; INTRAMUSCULAR at 09:05

## 2024-06-26 NOTE — PROGRESS NOTES
"Chief Complaint  Pain and Follow-up of the Left Shoulder    Subjective      Amber Poole presents to [facility] for follow up of the left shoulder.  She has treated her shoulder conservatively with injections in the past.  Her last steroid injection of the left shoulder was 3/18/24.  She has pain with certain movements.  She noted the injection lasted until 2-3 weeks ago.      Allergies   Allergen Reactions    Codeine Anaphylaxis and Rash    Penicillins Rash       Objective     Vital Signs:   Vitals:    06/26/24 0844   BP: 147/93   Pulse: 68   SpO2: 96%   Weight: 81.7 kg (180 lb 1.9 oz)   Height: 170.2 cm (67\")     Body mass index is 28.21 kg/m².    I reviewed the patient's chief complaint, history of present illness, review of systems, past medical history, surgical history, family history, social history, medications, and allergy list.     REVIEW OF SYSTEMS    Constitutional: Denies fevers, chills, weight loss  Cardiovascular: Denies chest pain, shortness of breath  Skin: Denies rashes, acute skin changes  Neurologic: Denies headache, loss of consciousness  MSK: Left shoulder pain.  .     Ortho Exam  Shoulder   General: Alert. No acute distress.  Left shoulder  180 degrees active elevation. Abduction 90 with pain. External rotation to 60 degrees. Internal rotation to Mid thoracic.  5/5 resisted shoulder abduction. 5/5 supraspinatus muscle testing without pain. 5/5 subscapularis muscle testing.  5/5 rotator cuff tendon testing. Demonstrates intact active elbow ROM. Demonstrates intact active wrist ROM. Sensation intact. Palpable radial pulse. Neurovascularly intact.      Large Joint Arthrocentesis: L subacromial bursa  Date/Time: 6/26/2024 9:05 AM  Consent given by: patient  Site marked: site marked  Timeout: Immediately prior to procedure a time out was called to verify the correct patient, procedure, equipment, support staff and site/side marked as required   Supporting " Documentation  Indications: pain   Procedure Details  Location: shoulder - L subacromial bursa  Preparation: Patient was prepped and draped in the usual sterile fashion  Needle gauge: 21 G.  Approach: posterior  Medications administered: 5 mL lidocaine 1 %; 40 mg triamcinolone acetonide 40 MG/ML  Patient tolerance: patient tolerated the procedure well with no immediate complications         This injection documentation was Scribed for SHAILESH Cottrell by Anabela Peoples.  06/26/24   09:05 EDT    Imaging Results (Most Recent)       None                Assessment and Plan   Diagnoses and all orders for this visit:    1. Left shoulder pain, unspecified chronicity (Primary)    2. Impingement syndrome of left shoulder    Other orders  -     Large Joint Arthrocentesis: L subacromial bursa         Amber Poole presents to [facility] for follow up of the left shoulder.  She treats her shoulder pain conservatively with injections.  She tolerated the injection well today.  She is not a diabetic.     We discussed the risks and benefits with the patient regarding left shoulder steroid injection. Patient understood and elected to proceed.  Left shoulder steroid injection given in office today. Patient tolerated injection well with no complications.     Patient is eligible for repeat steroid injection in 3 months.          Tobacco Use: Low Risk  (6/26/2024)    Patient History     Smoking Tobacco Use: Never     Smokeless Tobacco Use: Never     Passive Exposure: Not on file     Patient reports that they are a nonsmoker; cessation education not applicable.            Follow Up   Return in about 3 months (around 9/26/2024).  There are no Patient Instructions on file for this visit.  Patient was given instructions and counseling regarding her condition or for health maintenance advice. Please see specific information pulled into the AVS if appropriate.     Scribed by Leonila Sol  MA    Dictated Utilizing Dragon Dictation. Please note that portions of this note were completed with a voice recognition program. Part of this note may be an electronic transcription/translation of spoken language to printed text using the Dragon Dictation System.

## 2024-08-12 ENCOUNTER — HOSPITAL ENCOUNTER (OUTPATIENT)
Dept: MAMMOGRAPHY | Facility: HOSPITAL | Age: 59
Discharge: HOME | End: 2024-08-12
Admitting: OBSTETRICS & GYNECOLOGY
Payer: COMMERCIAL

## 2024-08-12 DIAGNOSIS — Z12.31 SCREENING MAMMOGRAM FOR BREAST CANCER: ICD-10-CM

## 2024-08-12 PROCEDURE — 77063 BREAST TOMOSYNTHESIS BI: CPT

## 2024-08-12 PROCEDURE — 77067 SCR MAMMO BI INCL CAD: CPT

## 2024-08-26 DIAGNOSIS — R23.2 HOT FLASHES: ICD-10-CM

## 2024-08-26 DIAGNOSIS — F51.01 PRIMARY INSOMNIA: ICD-10-CM

## 2024-08-26 DIAGNOSIS — E78.2 MIXED HYPERLIPIDEMIA: ICD-10-CM

## 2024-08-26 RX ORDER — ESCITALOPRAM OXALATE 10 MG/1
10 TABLET ORAL DAILY
Qty: 90 TABLET | Refills: 1 | OUTPATIENT
Start: 2024-08-26

## 2024-08-26 RX ORDER — ATORVASTATIN CALCIUM 20 MG/1
20 TABLET, FILM COATED ORAL DAILY
Qty: 90 TABLET | Refills: 1 | OUTPATIENT
Start: 2024-08-26

## 2024-08-26 RX ORDER — TRAZODONE HYDROCHLORIDE 50 MG/1
25 TABLET, FILM COATED ORAL
Qty: 45 TABLET | Refills: 1 | OUTPATIENT
Start: 2024-08-26

## 2024-08-27 DIAGNOSIS — R23.2 HOT FLASHES: ICD-10-CM

## 2024-08-27 DIAGNOSIS — F51.01 PRIMARY INSOMNIA: ICD-10-CM

## 2024-08-27 DIAGNOSIS — E78.2 MIXED HYPERLIPIDEMIA: ICD-10-CM

## 2024-08-27 RX ORDER — ESCITALOPRAM OXALATE 10 MG/1
10 TABLET ORAL DAILY
Qty: 90 TABLET | Refills: 1 | OUTPATIENT
Start: 2024-08-27

## 2024-08-27 RX ORDER — ATORVASTATIN CALCIUM 20 MG/1
20 TABLET, FILM COATED ORAL DAILY
Qty: 90 TABLET | Refills: 1 | OUTPATIENT
Start: 2024-08-27

## 2024-08-27 RX ORDER — TRAZODONE HYDROCHLORIDE 50 MG/1
25 TABLET, FILM COATED ORAL
Qty: 45 TABLET | Refills: 1 | OUTPATIENT
Start: 2024-08-27

## 2024-08-28 ENCOUNTER — LAB (OUTPATIENT)
Dept: LAB | Facility: HOSPITAL | Age: 59
End: 2024-08-28
Payer: COMMERCIAL

## 2024-08-28 ENCOUNTER — TRANSCRIBE ORDERS (OUTPATIENT)
Dept: LAB | Facility: HOSPITAL | Age: 59
End: 2024-08-28
Payer: COMMERCIAL

## 2024-08-28 DIAGNOSIS — E03.9 MYXEDEMA HEART DISEASE: Primary | ICD-10-CM

## 2024-08-28 DIAGNOSIS — I51.9 MYXEDEMA HEART DISEASE: Primary | ICD-10-CM

## 2024-08-28 DIAGNOSIS — E03.9 MYXEDEMA HEART DISEASE: ICD-10-CM

## 2024-08-28 DIAGNOSIS — I51.9 MYXEDEMA HEART DISEASE: ICD-10-CM

## 2024-08-28 LAB
T3FREE SERPL-MCNC: 4.01 PG/ML (ref 2–4.4)
T4 FREE SERPL-MCNC: 1.58 NG/DL (ref 0.92–1.68)
TSH SERPL DL<=0.05 MIU/L-ACNC: 0.07 UIU/ML (ref 0.27–4.2)

## 2024-08-28 PROCEDURE — 36415 COLL VENOUS BLD VENIPUNCTURE: CPT

## 2024-08-28 PROCEDURE — 84439 ASSAY OF FREE THYROXINE: CPT

## 2024-08-28 PROCEDURE — 84443 ASSAY THYROID STIM HORMONE: CPT

## 2024-08-28 PROCEDURE — 84481 FREE ASSAY (FT-3): CPT

## 2024-09-05 ENCOUNTER — TRANSCRIBE ORDERS (OUTPATIENT)
Dept: LAB | Facility: HOSPITAL | Age: 59
End: 2024-09-05
Payer: COMMERCIAL

## 2024-09-05 ENCOUNTER — LAB (OUTPATIENT)
Dept: LAB | Facility: HOSPITAL | Age: 59
End: 2024-09-05
Payer: COMMERCIAL

## 2024-09-05 DIAGNOSIS — R53.83 TIREDNESS: ICD-10-CM

## 2024-09-05 DIAGNOSIS — I51.9 MYXEDEMA HEART DISEASE: Primary | ICD-10-CM

## 2024-09-05 DIAGNOSIS — I51.9 MYXEDEMA HEART DISEASE: ICD-10-CM

## 2024-09-05 DIAGNOSIS — E03.9 MYXEDEMA HEART DISEASE: ICD-10-CM

## 2024-09-05 DIAGNOSIS — E03.9 MYXEDEMA HEART DISEASE: Primary | ICD-10-CM

## 2024-09-05 LAB
ALBUMIN SERPL-MCNC: 4.1 G/DL (ref 3.5–5.2)
ALBUMIN/GLOB SERPL: 1.6 G/DL
ALP SERPL-CCNC: 122 U/L (ref 39–117)
ALT SERPL W P-5'-P-CCNC: 23 U/L (ref 1–33)
ANION GAP SERPL CALCULATED.3IONS-SCNC: 10 MMOL/L (ref 5–15)
AST SERPL-CCNC: 29 U/L (ref 1–32)
BASOPHILS # BLD AUTO: 0.05 10*3/MM3 (ref 0–0.2)
BASOPHILS NFR BLD AUTO: 0.7 % (ref 0–1.5)
BILIRUB SERPL-MCNC: 0.4 MG/DL (ref 0–1.2)
BUN SERPL-MCNC: 17 MG/DL (ref 6–20)
BUN/CREAT SERPL: 21 (ref 7–25)
CALCIUM SPEC-SCNC: 9.6 MG/DL (ref 8.6–10.5)
CHLORIDE SERPL-SCNC: 107 MMOL/L (ref 98–107)
CO2 SERPL-SCNC: 24 MMOL/L (ref 22–29)
CREAT SERPL-MCNC: 0.81 MG/DL (ref 0.57–1)
DEPRECATED RDW RBC AUTO: 39 FL (ref 37–54)
EGFRCR SERPLBLD CKD-EPI 2021: 85.3 ML/MIN/1.73
EOSINOPHIL # BLD AUTO: 0.59 10*3/MM3 (ref 0–0.4)
EOSINOPHIL NFR BLD AUTO: 7.9 % (ref 0.3–6.2)
ERYTHROCYTE [DISTWIDTH] IN BLOOD BY AUTOMATED COUNT: 12.4 % (ref 12.3–15.4)
GLOBULIN UR ELPH-MCNC: 2.6 GM/DL
GLUCOSE SERPL-MCNC: 87 MG/DL (ref 65–99)
HCT VFR BLD AUTO: 39.1 % (ref 34–46.6)
HGB BLD-MCNC: 12.8 G/DL (ref 12–15.9)
IMM GRANULOCYTES # BLD AUTO: 0.05 10*3/MM3 (ref 0–0.05)
IMM GRANULOCYTES NFR BLD AUTO: 0.7 % (ref 0–0.5)
LYMPHOCYTES # BLD AUTO: 1.58 10*3/MM3 (ref 0.7–3.1)
LYMPHOCYTES NFR BLD AUTO: 21.1 % (ref 19.6–45.3)
MCH RBC QN AUTO: 28.3 PG (ref 26.6–33)
MCHC RBC AUTO-ENTMCNC: 32.7 G/DL (ref 31.5–35.7)
MCV RBC AUTO: 86.3 FL (ref 79–97)
MONOCYTES # BLD AUTO: 0.65 10*3/MM3 (ref 0.1–0.9)
MONOCYTES NFR BLD AUTO: 8.7 % (ref 5–12)
NEUTROPHILS NFR BLD AUTO: 4.56 10*3/MM3 (ref 1.7–7)
NEUTROPHILS NFR BLD AUTO: 60.9 % (ref 42.7–76)
NRBC BLD AUTO-RTO: 0 /100 WBC (ref 0–0.2)
PLATELET # BLD AUTO: 303 10*3/MM3 (ref 140–450)
PMV BLD AUTO: 10.8 FL (ref 6–12)
POTASSIUM SERPL-SCNC: 4.2 MMOL/L (ref 3.5–5.2)
PROT SERPL-MCNC: 6.7 G/DL (ref 6–8.5)
RBC # BLD AUTO: 4.53 10*6/MM3 (ref 3.77–5.28)
SODIUM SERPL-SCNC: 141 MMOL/L (ref 136–145)
VIT B12 BLD-MCNC: 1704 PG/ML (ref 211–946)
WBC NRBC COR # BLD AUTO: 7.48 10*3/MM3 (ref 3.4–10.8)

## 2024-09-05 PROCEDURE — 36415 COLL VENOUS BLD VENIPUNCTURE: CPT

## 2024-09-05 PROCEDURE — 86431 RHEUMATOID FACTOR QUANT: CPT

## 2024-09-05 PROCEDURE — 86235 NUCLEAR ANTIGEN ANTIBODY: CPT

## 2024-09-05 PROCEDURE — 85025 COMPLETE CBC W/AUTO DIFF WBC: CPT

## 2024-09-05 PROCEDURE — 82607 VITAMIN B-12: CPT

## 2024-09-05 PROCEDURE — 80053 COMPREHEN METABOLIC PANEL: CPT

## 2024-09-06 LAB
CHROMATIN AB SERPL-ACNC: <0.2 AI (ref 0–0.9)
DSDNA AB SER-ACNC: <1 IU/ML (ref 0–9)
ENA RNP AB SER-ACNC: 0.8 AI (ref 0–0.9)
ENA SM AB SER-ACNC: <0.2 AI (ref 0–0.9)
ENA SS-A AB SER-ACNC: <0.2 AI (ref 0–0.9)
ENA SS-B AB SER-ACNC: <0.2 AI (ref 0–0.9)
RHEUMATOID FACT SERPL-ACNC: <10 IU/ML

## 2024-09-12 ENCOUNTER — OFFICE VISIT (OUTPATIENT)
Dept: FAMILY MEDICINE CLINIC | Facility: CLINIC | Age: 59
End: 2024-09-12
Payer: COMMERCIAL

## 2024-09-12 VITALS
HEART RATE: 62 BPM | OXYGEN SATURATION: 97 % | BODY MASS INDEX: 29.44 KG/M2 | TEMPERATURE: 96.7 F | SYSTOLIC BLOOD PRESSURE: 148 MMHG | HEIGHT: 67 IN | WEIGHT: 187.6 LBS | DIASTOLIC BLOOD PRESSURE: 85 MMHG

## 2024-09-12 DIAGNOSIS — J30.2 SEASONAL ALLERGIC RHINITIS, UNSPECIFIED TRIGGER: ICD-10-CM

## 2024-09-12 DIAGNOSIS — Z23 NEED FOR INFLUENZA VACCINATION: ICD-10-CM

## 2024-09-12 DIAGNOSIS — M17.12 PRIMARY OSTEOARTHRITIS OF LEFT KNEE: ICD-10-CM

## 2024-09-12 DIAGNOSIS — E03.9 ACQUIRED HYPOTHYROIDISM: Primary | ICD-10-CM

## 2024-09-12 DIAGNOSIS — D50.9 IRON DEFICIENCY ANEMIA, UNSPECIFIED IRON DEFICIENCY ANEMIA TYPE: ICD-10-CM

## 2024-09-12 DIAGNOSIS — K44.9 HIATAL HERNIA: ICD-10-CM

## 2024-09-12 DIAGNOSIS — R45.1 RESTLESS: ICD-10-CM

## 2024-09-12 DIAGNOSIS — R05.9 COUGH: ICD-10-CM

## 2024-09-12 DIAGNOSIS — F51.01 PRIMARY INSOMNIA: ICD-10-CM

## 2024-09-12 DIAGNOSIS — E78.2 MIXED HYPERLIPIDEMIA: ICD-10-CM

## 2024-09-12 DIAGNOSIS — J30.89 SEASONAL ALLERGIC RHINITIS DUE TO OTHER ALLERGIC TRIGGER: ICD-10-CM

## 2024-09-12 DIAGNOSIS — R23.2 HOT FLASHES: ICD-10-CM

## 2024-09-12 LAB
CHOLEST SERPL-MCNC: 156 MG/DL (ref 0–200)
FERRITIN SERPL-MCNC: 40.8 NG/ML (ref 13–150)
HDLC SERPL-MCNC: 48 MG/DL (ref 40–60)
IRON 24H UR-MRATE: 49 MCG/DL (ref 37–145)
IRON SATN MFR SERPL: 12 % (ref 20–50)
LDLC SERPL CALC-MCNC: 91 MG/DL (ref 0–100)
LDLC/HDLC SERPL: 1.86 {RATIO}
TIBC SERPL-MCNC: 399 MCG/DL (ref 298–536)
TRANSFERRIN SERPL-MCNC: 268 MG/DL (ref 200–360)
TRIGL SERPL-MCNC: 93 MG/DL (ref 0–150)
VLDLC SERPL-MCNC: 17 MG/DL (ref 5–40)

## 2024-09-12 PROCEDURE — 84466 ASSAY OF TRANSFERRIN: CPT | Performed by: NURSE PRACTITIONER

## 2024-09-12 PROCEDURE — 90656 IIV3 VACC NO PRSV 0.5 ML IM: CPT | Performed by: NURSE PRACTITIONER

## 2024-09-12 PROCEDURE — 80061 LIPID PANEL: CPT | Performed by: NURSE PRACTITIONER

## 2024-09-12 PROCEDURE — 99214 OFFICE O/P EST MOD 30 MIN: CPT | Performed by: NURSE PRACTITIONER

## 2024-09-12 PROCEDURE — 90471 IMMUNIZATION ADMIN: CPT | Performed by: NURSE PRACTITIONER

## 2024-09-12 PROCEDURE — 82728 ASSAY OF FERRITIN: CPT | Performed by: NURSE PRACTITIONER

## 2024-09-12 PROCEDURE — 83540 ASSAY OF IRON: CPT | Performed by: NURSE PRACTITIONER

## 2024-09-12 RX ORDER — ATORVASTATIN CALCIUM 20 MG/1
20 TABLET, FILM COATED ORAL DAILY
Qty: 90 TABLET | Refills: 1 | Status: SHIPPED | OUTPATIENT
Start: 2024-09-12

## 2024-09-12 RX ORDER — CELECOXIB 100 MG/1
100 CAPSULE ORAL 2 TIMES DAILY
Qty: 180 CAPSULE | Refills: 1 | Status: SHIPPED | OUTPATIENT
Start: 2024-09-12

## 2024-09-12 RX ORDER — BUSPIRONE HYDROCHLORIDE 5 MG/1
5 TABLET ORAL NIGHTLY
Qty: 30 TABLET | Refills: 2 | Status: SHIPPED | OUTPATIENT
Start: 2024-09-12

## 2024-09-12 RX ORDER — TRAZODONE HYDROCHLORIDE 50 MG/1
TABLET, FILM COATED ORAL
Qty: 90 TABLET | Refills: 1 | Status: SHIPPED | OUTPATIENT
Start: 2024-09-12

## 2024-09-12 RX ORDER — MONTELUKAST SODIUM 10 MG/1
10 TABLET ORAL
Qty: 90 TABLET | Refills: 1 | Status: SHIPPED | OUTPATIENT
Start: 2024-09-12

## 2024-09-12 RX ORDER — LORATADINE 10 MG/1
10 TABLET ORAL DAILY
Qty: 90 TABLET | Refills: 1 | Status: SHIPPED | OUTPATIENT
Start: 2024-09-12

## 2024-09-12 RX ORDER — ESCITALOPRAM OXALATE 10 MG/1
10 TABLET ORAL DAILY
Qty: 90 TABLET | Refills: 1 | Status: SHIPPED | OUTPATIENT
Start: 2024-09-12

## 2024-09-12 RX ORDER — FAMOTIDINE 20 MG/1
20 TABLET, FILM COATED ORAL 2 TIMES DAILY
Qty: 180 TABLET | Refills: 1 | Status: SHIPPED | OUTPATIENT
Start: 2024-09-12

## 2024-09-12 NOTE — PROGRESS NOTES
Chief Complaint  Hyperlipidemia (6 month follow up)    Subjective          Amber Poole presents to [facility]  History of Present Illness  Anemia:   Her iron was stable with her last set of labs but needs now.  She does take her Vit D.  She is doing her colace daily but is feeling like she may need to increase on the colace.  .  Hot Flashes:  The lexapro and is doing good and she is still doing the hormones.     Allergies:  She is taking her claritin and singular.  Arthritis:  She is doing good overall.     Insomnia: She is soso well with her trazodone.  She takes 1/4 is sleeping better. She is worried about her wt. She did keto but she doesn't want to gain wt. she said she cannot get her mind to stop morning.  Thyroid:  managed by endo      Allergies  Codeine and Penicillins    Social History     Tobacco Use    Smoking status: Never    Smokeless tobacco: Never   Vaping Use    Vaping status: Never Used   Substance Use Topics    Alcohol use: Never    Drug use: Never       Family History   Problem Relation Age of Onset    Diabetes Mother     Kidney nephrosis Mother     Heart disease Father     Vision loss Father     Macular degeneration Father     Macular degeneration Sister     Cancer Daughter     Leukemia Daughter     Leukemia Son     Kidney nephrosis Maternal Grandmother     Other Maternal Grandmother         BLADDER CALCULUS    Liver cancer Other         UNSPECIFIED    Lung cancer Other         UNSPECIFIED    Malig Hyperthermia Neg Hx         Health Maintenance Due   Topic Date Due    ZOSTER VACCINE (1 of 2) Never done        Immunization History   Administered Date(s) Administered    Fluzone  >6mos 09/12/2024    Influenza, Unspecified 10/01/2020    Tdap 08/06/2018       Review of Systems   Constitutional:  Positive for fatigue.   Respiratory:  Negative for cough and shortness of breath.    Cardiovascular:  Negative for chest pain.   Gastrointestinal:  Negative for  "diarrhea, nausea and vomiting.        Objective       Vitals:    09/12/24 0858   BP: 148/85   BP Location: Left arm   Patient Position: Sitting   Cuff Size: Adult   Pulse: 62   Temp: 96.7 °F (35.9 °C)   SpO2: 97%   Weight: 85.1 kg (187 lb 9.6 oz)   Height: 170.2 cm (67\")       Body mass index is 29.38 kg/m².         Physical Exam  Vitals reviewed.   Constitutional:       Appearance: Normal appearance. She is well-developed.   Cardiovascular:      Rate and Rhythm: Normal rate and regular rhythm.      Heart sounds: Normal heart sounds. No murmur heard.  Pulmonary:      Effort: Pulmonary effort is normal.      Breath sounds: Normal breath sounds.   Neurological:      Mental Status: She is alert and oriented to person, place, and time.      Cranial Nerves: No cranial nerve deficit.      Motor: No weakness.   Psychiatric:         Mood and Affect: Mood and affect normal.               Result Review :     The following data was reviewed by: SHAILESH Colvin on 09/12/2024:    Common Labs   Common labs          6/4/2024    11:45 9/5/2024    08:04 9/12/2024    09:49   Common Labs   Glucose 137  87     BUN 10  17     Creatinine 0.65  0.81     Sodium 140  141     Potassium 3.8  4.2     Chloride 106  107     Calcium 9.8  9.6     Albumin  4.1     Total Bilirubin  0.4     Alkaline Phosphatase  122     AST (SGOT)  29     ALT (SGPT)  23     WBC 10.51  7.48     Hemoglobin 14.6  12.8     Hematocrit 43.5  39.1     Platelets 305  303     Total Cholesterol   156    Triglycerides   93    HDL Cholesterol   48    LDL Cholesterol    91            Mammo Screening Digital Tomosynthesis Bilateral With CAD    Result Date: 8/12/2024  Benign findings. No mammographic evidence of malignancy. Recommend routine mammographic screening.  BI-RADS ASSESSMENT: BI-RADS 2. Benign findings.  The patient's information is entered into a computerized reminder system with a targeted due date for the next mammogram.  Note:  It has been reported that " there is approximately a 15% false negative rate in mammography.  Therefore, management of a palpable abnormality should not be deferred because of a negative mammogram.     Electronically Signed By-MEGAN VALDEZ MD On:2024 10:13 AM                  Assessment and Plan      Assessment & Plan  Mixed hyperlipidemia     Primary osteoarthritis of left knee    Hot flashes    Seasonal allergic rhinitis due to other allergic trigger    Cough    Seasonal allergic rhinitis, unspecified trigger    Acquired hypothyroidism    Primary insomnia  We will add buspar with 25mg of trazodone.  She has the worry at bedtime--she will lay there for 2 hours before she falls asleep.   Iron deficiency anemia, unspecified iron deficiency anemia type    Restless    Hiatal hernia    Need for influenza vaccination      Orders Placed This Encounter   Procedures    Fluzone >6mos    Lipid Panel    Ferritin    Iron Profile     New Medications Ordered This Visit   Medications    atorvastatin (LIPITOR) 20 MG tablet     Sig: Take 1 tablet by mouth Daily.     Dispense:  90 tablet     Refill:  1    celecoxib (CeleBREX) 100 MG capsule     Sig: Take 1 capsule by mouth 2 (Two) Times a Day.     Dispense:  180 capsule     Refill:  1    escitalopram (LEXAPRO) 10 MG tablet     Sig: Take 1 tablet by mouth Daily.     Dispense:  90 tablet     Refill:  1    loratadine (CLARITIN) 10 MG tablet     Sig: Take 1 tablet by mouth Daily.     Dispense:  90 tablet     Refill:  1    montelukast (SINGULAIR) 10 MG tablet     Sig: Take 1 tablet by mouth every night at bedtime.     Dispense:  90 tablet     Refill:  1    traZODone (DESYREL) 50 MG tablet     Si/2-1 tab to 1 hr before bed     Dispense:  90 tablet     Refill:  1    busPIRone (BUSPAR) 5 MG tablet     Sig: Take 1 tablet by mouth Every Night.     Dispense:  30 tablet     Refill:  2    famotidine (PEPCID) 20 MG tablet     Sig: Take 1 tablet by mouth 2 (Two) Times a Day.     Dispense:  180 tablet     Refill:  1           Diagnosis Plan   1. Acquired hypothyroidism        2. Mixed hyperlipidemia  atorvastatin (LIPITOR) 20 MG tablet    Lipid Panel      3. Primary osteoarthritis of left knee  celecoxib (CeleBREX) 100 MG capsule      4. Hot flashes  escitalopram (LEXAPRO) 10 MG tablet      5. Seasonal allergic rhinitis due to other allergic trigger  loratadine (CLARITIN) 10 MG tablet      6. Cough  montelukast (SINGULAIR) 10 MG tablet      7. Seasonal allergic rhinitis, unspecified trigger  montelukast (SINGULAIR) 10 MG tablet      8. Primary insomnia  traZODone (DESYREL) 50 MG tablet    busPIRone (BUSPAR) 5 MG tablet      9. Iron deficiency anemia, unspecified iron deficiency anemia type  Ferritin    Iron Profile      10. Restless  busPIRone (BUSPAR) 5 MG tablet      11. Hiatal hernia  famotidine (PEPCID) 20 MG tablet      12. Need for influenza vaccination  Fluzone >6mos                Follow Up     Return in about 6 months (around 3/12/2025).  Follow-up in 6 months for labs and appt. Call with any concerns or questions that you may have regarding your medications or history.      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    Patient was given instructions and counseling regarding her condition or for health maintenance advice. Please see specific information pulled into the AVS if appropriate.     Parts of this note are electronic transcriptions/translations of spoken language to printed text using the Dragon Dictation system.          Faith Ramos, APRARIE  09/12/2024  Answers submitted by the patient for this visit:  Other (Submitted on 9/5/2024)  Please describe your symptoms.: Refill meds  Have you had these symptoms before?: No  How long have you been having these symptoms?: 1-4 days  Primary Reason for Visit (Submitted on 9/5/2024)  What is the primary reason for your visit?: Other

## 2024-09-20 ENCOUNTER — PATIENT ROUNDING (BHMG ONLY) (OUTPATIENT)
Dept: FAMILY MEDICINE CLINIC | Facility: CLINIC | Age: 59
End: 2024-09-20
Payer: COMMERCIAL

## 2024-10-02 ENCOUNTER — OFFICE VISIT (OUTPATIENT)
Dept: ORTHOPEDIC SURGERY | Facility: CLINIC | Age: 59
End: 2024-10-02
Payer: COMMERCIAL

## 2024-10-02 VITALS
WEIGHT: 177 LBS | DIASTOLIC BLOOD PRESSURE: 78 MMHG | BODY MASS INDEX: 27.78 KG/M2 | OXYGEN SATURATION: 94 % | SYSTOLIC BLOOD PRESSURE: 116 MMHG | HEART RATE: 67 BPM | HEIGHT: 67 IN

## 2024-10-02 DIAGNOSIS — M25.512 LEFT SHOULDER PAIN, UNSPECIFIED CHRONICITY: Primary | ICD-10-CM

## 2024-10-02 DIAGNOSIS — M75.42 IMPINGEMENT SYNDROME OF LEFT SHOULDER: ICD-10-CM

## 2024-10-02 RX ORDER — TRIAMCINOLONE ACETONIDE 40 MG/ML
40 INJECTION, SUSPENSION INTRA-ARTICULAR; INTRAMUSCULAR
Status: COMPLETED | OUTPATIENT
Start: 2024-10-02 | End: 2024-10-02

## 2024-10-02 RX ORDER — LIDOCAINE HYDROCHLORIDE 10 MG/ML
5 INJECTION, SOLUTION INFILTRATION; PERINEURAL
Status: COMPLETED | OUTPATIENT
Start: 2024-10-02 | End: 2024-10-02

## 2024-10-02 RX ADMIN — LIDOCAINE HYDROCHLORIDE 5 ML: 10 INJECTION, SOLUTION INFILTRATION; PERINEURAL at 09:28

## 2024-10-02 RX ADMIN — TRIAMCINOLONE ACETONIDE 40 MG: 40 INJECTION, SUSPENSION INTRA-ARTICULAR; INTRAMUSCULAR at 09:28

## 2024-10-02 NOTE — PROGRESS NOTES
"Chief Complaint  Follow-up of the Left Shoulder    Subjective      Amber Poole presents to [facility] for follow up of her left shoulder.  Patient has left shoulder pain and impingement syndrome that she is treating conservatively with intermittent steroid injections.  Patient was last seen in office on 6/26/2024 and received a left shoulder steroid injection..  Patient states that she got significant relief with the previous steroid injection.  She denies any falls or injuries.  She states that it started to wear off approximately 7 to 10 days ago and she would like to repeat her left shoulder steroid injection today in office.    Allergies   Allergen Reactions    Codeine Anaphylaxis and Rash    Penicillins Rash       Objective     Vital Signs:   Vitals:    10/02/24 0903   BP: 116/78   Pulse: 67   SpO2: 94%   Weight: 80.3 kg (177 lb)   Height: 170.2 cm (67.01\")     Body mass index is 27.72 kg/m².    I reviewed the patient's chief complaint, history of present illness, review of systems, past medical history, surgical history, family history, social history, medications, and allergy list.     REVIEW OF SYSTEMS    Constitutional: Denies fevers, chills, weight loss  Cardiovascular: Denies chest pain, shortness of breath  Skin: Denies rashes, acute skin changes  Neurologic: Denies headache, loss of consciousness  MSK: Left shoulder pain.     Ortho Exam  Shoulder   General: Alert. No acute distress.  Left upper Extremity: 180 degrees active elevation. External rotation to 75 degrees. Demonstrates intact active elbow ROM. Demonstrates intact active wrist ROM. Sensation intact. Palpable radial pulse. Neurovascularly intact.      Large Joint: L subacromial bursa  Date/Time: 10/2/2024 9:28 AM  Consent given by: patient  Site marked: site marked  Timeout: Immediately prior to procedure a time out was called to verify the correct patient, procedure, equipment, support staff and site/side " marked as required   Supporting Documentation  Indications: pain   Procedure Details  Location: shoulder - L subacromial bursa  Preparation: Patient was prepped and draped in the usual sterile fashion  Needle gauge: 21g.  Medications administered: 5 mL lidocaine 1 %; 40 mg triamcinolone acetonide 40 MG/ML  Patient tolerance: patient tolerated the procedure well with no immediate complications       This injection documentation was Scribed for SHAILESH Cottrell by Maria Luz Etienne MA.  10/02/24   09:28 EDT      Imaging Results (Most Recent)       None                Assessment and Plan   Diagnoses and all orders for this visit:    1. Left shoulder pain, unspecified chronicity (Primary)    2. Impingement syndrome of left shoulder    Other orders  -     Large Joint: L subacromial bursa         Amber Poole presents today to [facility] for the follow up of their left shoulder. They have left shoulder pain and impingement syndrome that we have been treating conservatively with intermittent injections.     We discussed the risks and benefits with the patient regarding left shoulder steroid injection. Patient understood and elected to proceed.  Left shoulder steroid injection given in office today. Patient tolerated injection well with no complications.     Patient is eligible for repeat steroid injection in 3 months.        Tobacco Use: Low Risk  (10/2/2024)    Patient History     Smoking Tobacco Use: Never     Smokeless Tobacco Use: Never     Passive Exposure: Not on file     Patient reports that they are a nonsmoker; cessation education not applicable.            Follow Up   Return in about 3 months (around 1/2/2025).  There are no Patient Instructions on file for this visit.  Patient was given instructions and counseling regarding her condition or for health maintenance advice. Please see specific information pulled into the AVS if appropriate.       Dictated Utilizing Dragon Dictation. Please note  that portions of this note were completed with a voice recognition program. Part of this note may be an electronic transcription/translation of spoken language to printed text using the Dragon Dictation System.

## 2024-10-10 DIAGNOSIS — E66.9 OBESITY WITH SERIOUS COMORBIDITY, UNSPECIFIED CLASS, UNSPECIFIED OBESITY TYPE: Primary | ICD-10-CM

## 2024-10-11 ENCOUNTER — LAB (OUTPATIENT)
Dept: LAB | Facility: HOSPITAL | Age: 59
End: 2024-10-11
Payer: COMMERCIAL

## 2024-10-11 DIAGNOSIS — E66.9 OBESITY WITH SERIOUS COMORBIDITY, UNSPECIFIED CLASS, UNSPECIFIED OBESITY TYPE: ICD-10-CM

## 2024-10-11 LAB — HBA1C MFR BLD: 5.3 % (ref 4.8–5.6)

## 2024-10-11 PROCEDURE — 36415 COLL VENOUS BLD VENIPUNCTURE: CPT

## 2024-10-11 PROCEDURE — 83036 HEMOGLOBIN GLYCOSYLATED A1C: CPT

## 2024-10-25 ENCOUNTER — TELEPHONE (OUTPATIENT)
Dept: FAMILY MEDICINE CLINIC | Facility: CLINIC | Age: 59
End: 2024-10-25
Payer: COMMERCIAL

## 2024-10-28 ENCOUNTER — OFFICE VISIT (OUTPATIENT)
Dept: FAMILY MEDICINE CLINIC | Facility: CLINIC | Age: 59
End: 2024-10-28
Payer: COMMERCIAL

## 2024-10-28 VITALS
TEMPERATURE: 96.7 F | BODY MASS INDEX: 27.72 KG/M2 | OXYGEN SATURATION: 98 % | HEIGHT: 67 IN | HEART RATE: 69 BPM | WEIGHT: 176.6 LBS | SYSTOLIC BLOOD PRESSURE: 128 MMHG | DIASTOLIC BLOOD PRESSURE: 78 MMHG

## 2024-10-28 DIAGNOSIS — E66.9 OBESITY WITH SERIOUS COMORBIDITY, UNSPECIFIED CLASS, UNSPECIFIED OBESITY TYPE: ICD-10-CM

## 2024-10-28 DIAGNOSIS — Q38.3 TONGUE ABNORMALITY: Primary | ICD-10-CM

## 2024-10-28 DIAGNOSIS — R42 DIZZINESS: ICD-10-CM

## 2024-10-28 DIAGNOSIS — K58.2 IRRITABLE BOWEL SYNDROME WITH BOTH CONSTIPATION AND DIARRHEA: ICD-10-CM

## 2024-10-28 PROCEDURE — 99214 OFFICE O/P EST MOD 30 MIN: CPT | Performed by: NURSE PRACTITIONER

## 2024-10-28 RX ORDER — PREDNISONE 50 MG/1
50 TABLET ORAL DAILY
Qty: 3 TABLET | Refills: 0 | Status: SHIPPED | OUTPATIENT
Start: 2024-10-28

## 2024-10-28 NOTE — PROGRESS NOTES
Chief Complaint  Mouth Lesions (Spot on tongue needs new referral for ENT )    Subjective          Amber Poole presents to [facility]  History of Present Illness  She has had this issues on the left side of tongue.  She doesn't recall doing anything on the tongue.  She does not use any tobacco.    She did have some dizziness yesterday.  She felt a little dizzy when she moves left and right.      She has been having some constipation.  She is drinking Gatorade zero.  She is losing wt.  She is taking med.  She stopped tea and pepsi zero and stopped with the med.  She needs a refill of her Zepbound.  She said that with his last dose after the third week she could tell that her hunger was coming back.  She has lost weight.  She is aware to increase protein and water.  She also needs to increase exercising she needs to be doing 10 minutes to 15 minutes a day.               Allergies  Codeine and Penicillins    Social History     Tobacco Use    Smoking status: Never    Smokeless tobacco: Never   Vaping Use    Vaping status: Never Used   Substance Use Topics    Alcohol use: Never    Drug use: Never       Family History   Problem Relation Age of Onset    Diabetes Mother     Kidney nephrosis Mother     Heart disease Father     Vision loss Father     Macular degeneration Father     Macular degeneration Sister     Cancer Daughter     Leukemia Daughter     Leukemia Son     Kidney nephrosis Maternal Grandmother     Liver cancer Other         UNSPECIFIED    Lung cancer Other         UNSPECIFIED    Malig Hyperthermia Neg Hx         There are no preventive care reminders to display for this patient.     Immunization History   Administered Date(s) Administered    Fluzone  >6mos 09/12/2024    Influenza, Unspecified 10/01/2020    Tdap 08/06/2018       Review of Systems   HENT:          Skin lesion to the tongue        Objective       Vitals:    10/28/24 0834   BP: 128/78   BP Location: Left  "arm   Patient Position: Sitting   Cuff Size: Adult   Pulse: 69   Temp: 96.7 °F (35.9 °C)   SpO2: 98%   Weight: 80.1 kg (176 lb 9.6 oz)   Height: 170.2 cm (67.01\")       Body mass index is 27.65 kg/m².         Physical Exam  Constitutional:       Appearance: Normal appearance.   HENT:      Head: Normocephalic.   Pulmonary:      Effort: Pulmonary effort is normal.   Skin:     Findings: No bruising.   Neurological:      General: No focal deficit present.      Mental Status: She is alert and oriented to person, place, and time.   Psychiatric:         Mood and Affect: Mood normal.         Behavior: Behavior normal.         Thought Content: Thought content normal.         Judgment: Judgment normal.       Left side of the tongue          Result Review :     The following data was reviewed by: SHAILESH Colvin on 10/28/2024:            Mammo Screening Digital Tomosynthesis Bilateral With CAD    Result Date: 8/12/2024  Benign findings. No mammographic evidence of malignancy. Recommend routine mammographic screening.  BI-RADS ASSESSMENT: BI-RADS 2. Benign findings.  The patient's information is entered into a computerized reminder system with a targeted due date for the next mammogram.  Note:  It has been reported that there is approximately a 15% false negative rate in mammography.  Therefore, management of a palpable abnormality should not be deferred because of a negative mammogram.     Electronically Signed By-MEGAN VALDEZ MD On:8/12/2024 10:13 AM                  Assessment and Plan      Assessment & Plan  Tongue abnormality  Referral to ENT  Dizziness  We will do 3 days of steroids.  Obesity with serious comorbidity, unspecified class, unspecified obesity type  Patient's (Body mass index is 27.65 kg/m².) continue on with the Zepbound.  I have increased the dose for an additional month.  She will keep me posted in roughly 4 weeks on how she is doing  Irritable bowel syndrome with both constipation and " diarrhea  Continue regimen but increase fluids.  It is mostly the constipation.  She has had this before the Zepbound started.    Orders Placed This Encounter   Procedures    Ambulatory Referral to ENT (Otolaryngology)     New Medications Ordered This Visit   Medications    predniSONE (DELTASONE) 50 MG tablet     Sig: Take 1 tablet by mouth Daily.     Dispense:  3 tablet     Refill:  0    Tirzepatide-Weight Management (ZEPBOUND) 7.5 MG/0.5ML solution auto-injector     Sig: Inject 0.5 mL under the skin into the appropriate area as directed 1 (One) Time Per Week.     Dispense:  2 mL     Refill:  0          Diagnosis Plan   1. Tongue abnormality  Ambulatory Referral to ENT (Otolaryngology)      2. Dizziness  predniSONE (DELTASONE) 50 MG tablet      3. Obesity with serious comorbidity, unspecified class, unspecified obesity type  Tirzepatide-Weight Management (ZEPBOUND) 7.5 MG/0.5ML solution auto-injector      4. Irritable bowel syndrome with both constipation and diarrhea                  Follow Up     No follow-ups on file.    Patient was given instructions and counseling regarding her condition or for health maintenance advice. Please see specific information pulled into the AVS if appropriate.     Parts of this note are electronic transcriptions/translations of spoken language to printed text using the Dragon Dictation system.          Faith Ramos, APRN  10/28/2024  Answers submitted by the patient for this visit:  Other (Submitted on 10/25/2024)  Please describe your symptoms.: Place on left side of tongue  Have you had these symptoms before?: Yes  How long have you been having these symptoms?: Greater than 2 weeks  Primary Reason for Visit (Submitted on 10/25/2024)  What is the primary reason for your visit?: Problem Not Listed

## 2024-11-18 ENCOUNTER — PATIENT MESSAGE (OUTPATIENT)
Dept: FAMILY MEDICINE CLINIC | Facility: CLINIC | Age: 59
End: 2024-11-18
Payer: COMMERCIAL

## 2024-11-18 DIAGNOSIS — E66.9 OBESITY WITH SERIOUS COMORBIDITY, UNSPECIFIED CLASS, UNSPECIFIED OBESITY TYPE: Primary | ICD-10-CM

## 2024-11-21 DIAGNOSIS — F51.01 PRIMARY INSOMNIA: ICD-10-CM

## 2024-11-21 DIAGNOSIS — R45.1 RESTLESS: ICD-10-CM

## 2024-11-21 RX ORDER — BUSPIRONE HYDROCHLORIDE 5 MG/1
5 TABLET ORAL
Qty: 30 TABLET | Refills: 2 | OUTPATIENT
Start: 2024-11-21

## 2024-11-21 NOTE — TELEPHONE ENCOUNTER
Bell with [facility] requesting refill buspar 5 mg for 90 days. This a sink patient.

## 2024-11-27 ENCOUNTER — TRANSCRIBE ORDERS (OUTPATIENT)
Dept: LAB | Facility: HOSPITAL | Age: 59
End: 2024-11-27
Payer: COMMERCIAL

## 2024-11-27 ENCOUNTER — LAB (OUTPATIENT)
Dept: LAB | Facility: HOSPITAL | Age: 59
End: 2024-11-27
Payer: COMMERCIAL

## 2024-11-27 DIAGNOSIS — R53.83 OTHER FATIGUE: ICD-10-CM

## 2024-11-27 DIAGNOSIS — E03.9 HYPOTHYROIDISM, UNSPECIFIED TYPE: Primary | ICD-10-CM

## 2024-11-27 DIAGNOSIS — E03.9 HYPOTHYROIDISM, UNSPECIFIED TYPE: ICD-10-CM

## 2024-11-27 LAB
T3FREE SERPL-MCNC: 2.12 PG/ML (ref 2–4.4)
T4 FREE SERPL-MCNC: 1.28 NG/DL (ref 0.92–1.68)
TSH SERPL DL<=0.05 MIU/L-ACNC: 0.83 UIU/ML (ref 0.27–4.2)

## 2024-11-27 PROCEDURE — 84443 ASSAY THYROID STIM HORMONE: CPT

## 2024-11-27 PROCEDURE — 36415 COLL VENOUS BLD VENIPUNCTURE: CPT

## 2024-11-27 PROCEDURE — 84481 FREE ASSAY (FT-3): CPT

## 2024-11-27 PROCEDURE — 84439 ASSAY OF FREE THYROXINE: CPT

## 2024-12-12 ENCOUNTER — OFFICE VISIT (OUTPATIENT)
Dept: FAMILY MEDICINE CLINIC | Facility: CLINIC | Age: 59
End: 2024-12-12
Payer: COMMERCIAL

## 2024-12-12 VITALS
WEIGHT: 172.3 LBS | HEIGHT: 67 IN | HEART RATE: 79 BPM | TEMPERATURE: 97.5 F | RESPIRATION RATE: 16 BRPM | OXYGEN SATURATION: 98 % | SYSTOLIC BLOOD PRESSURE: 120 MMHG | DIASTOLIC BLOOD PRESSURE: 75 MMHG | BODY MASS INDEX: 27.04 KG/M2

## 2024-12-12 DIAGNOSIS — K62.5 RECTAL BLEEDING: Primary | ICD-10-CM

## 2024-12-12 DIAGNOSIS — Z98.890 HISTORY OF HEMORRHOIDECTOMY: ICD-10-CM

## 2024-12-12 DIAGNOSIS — K59.09 OTHER CONSTIPATION: ICD-10-CM

## 2024-12-12 LAB — HEMOCCULT STL QL IA: NEGATIVE

## 2024-12-12 PROCEDURE — 82274 ASSAY TEST FOR BLOOD FECAL: CPT | Performed by: NURSE PRACTITIONER

## 2024-12-12 PROCEDURE — 99214 OFFICE O/P EST MOD 30 MIN: CPT | Performed by: NURSE PRACTITIONER

## 2024-12-12 RX ORDER — FERROUS SULFATE 325(65) MG
325 TABLET ORAL
COMMUNITY

## 2024-12-12 NOTE — PROGRESS NOTES
"Chief Complaint  Rectal Bleeding (States she had this before - states she had blood in her stool Tuesday -states she constipation.)    Subjective          Amber Poole presents to [facility]  History of Present Illness  She had to stop the iron about 6 weeks ago due to the constipation.  She said  that we placed a referral in to gastro and then they got pushed out for an additional couple months for a total of 7 months that she chose not to go.  She said that on Sunday it took all day to pass the stool and then Monday and Tuesday she went a little bit then Wednesday she increased her \"green vegetables and then it went back to normal\".  She knows that the Zepbound can cause some constipation but she has been having issues with constipation prior to the Zepbound.  She said that in 2021 she had her hemorrhoids removed and she was supposed to also have a capsule endoscopy but she never got that.  She really does not want to see general surgery she wants to see gastro.  She is taking her stool softener twice a day and trying to push fluids.    She has had some rectal bleeding at times but she has not had any today.               Allergies  Codeine and Penicillins    Social History     Tobacco Use    Smoking status: Never    Smokeless tobacco: Never   Vaping Use    Vaping status: Never Used   Substance Use Topics    Alcohol use: Never    Drug use: Never       Family History   Problem Relation Age of Onset    Diabetes Mother     Kidney nephrosis Mother     Heart disease Father     Vision loss Father     Macular degeneration Father     Macular degeneration Sister     Cancer Daughter     Leukemia Daughter     Leukemia Son     Kidney nephrosis Maternal Grandmother     Liver cancer Other         UNSPECIFIED    Lung cancer Other         UNSPECIFIED    Malig Hyperthermia Neg Hx         Health Maintenance Due   Topic Date Due    PAP SMEAR  12/06/2024        Immunization History " "  Administered Date(s) Administered    Fluzone  >6mos 09/12/2024    Influenza, Unspecified 10/01/2020    Tdap 08/06/2018       Review of Systems   Constitutional:  Negative for chills, diaphoresis, fatigue and fever.   HENT:  Negative for congestion, sore throat and swollen glands.    Respiratory:  Negative for cough.    Cardiovascular:  Negative for chest pain.   Gastrointestinal:  Positive for constipation. Negative for abdominal pain, diarrhea, nausea and vomiting.   Genitourinary:  Negative for dysuria.   Musculoskeletal:  Negative for myalgias and neck pain.   Skin:  Negative for rash.   Neurological:  Negative for weakness and numbness.        Objective       Vitals:    12/12/24 0916   BP: 120/75   Pulse: 79   Resp: 16   Temp: 97.5 °F (36.4 °C)   SpO2: 98%   Weight: 78.2 kg (172 lb 4.8 oz)   Height: 170.2 cm (67\")       Body mass index is 26.99 kg/m².         Physical Exam  Vitals reviewed.   Constitutional:       Appearance: Normal appearance. She is well-developed.   Cardiovascular:      Rate and Rhythm: Normal rate and regular rhythm.      Heart sounds: Normal heart sounds. No murmur heard.  Pulmonary:      Effort: Pulmonary effort is normal.      Breath sounds: Normal breath sounds.   Abdominal:      General: Bowel sounds are normal.   Neurological:      Mental Status: She is alert and oriented to person, place, and time.      Cranial Nerves: No cranial nerve deficit.      Motor: No weakness.   Psychiatric:         Mood and Affect: Mood and affect normal.               Result Review :     The following data was reviewed by: SHAILESH Colvin on 12/12/2024:            No Images in the past 120 days found..              Assessment and Plan      Assessment & Plan  Rectal bleeding    Orders:    Ambulatory Referral to Gastroenterology    Occult Blood, Fecal By Immunoassay - Stool, Per Rectum; Future    Occult Blood, Fecal By Immunoassay - Stool, Per Rectum  We will do a referral to gastrology.  History " of hemorrhoidectomy    Orders:    Ambulatory Referral to Gastroenterology    Other constipation  We will start Linzess.  She is aware she needs to push fluids.  We may need to hold Zepbound but we will currently we will continue it as she is having good luck with it.  Orders:    Ambulatory Referral to Gastroenterology    linaclotide (Linzess) 72 MCG capsule capsule; Take 1 capsule by mouth Every Morning Before Breakfast.    Occult Blood, Fecal By Immunoassay - Stool, Per Rectum; Future    Occult Blood, Fecal By Immunoassay - Stool, Per Rectum       Diagnosis Plan   1. Rectal bleeding  Ambulatory Referral to Gastroenterology    Occult Blood, Fecal By Immunoassay - Stool, Per Rectum    Occult Blood, Fecal By Immunoassay - Stool, Per Rectum      2. History of hemorrhoidectomy  Ambulatory Referral to Gastroenterology      3. Other constipation  Ambulatory Referral to Gastroenterology    linaclotide (Linzess) 72 MCG capsule capsule    Occult Blood, Fecal By Immunoassay - Stool, Per Rectum    Occult Blood, Fecal By Immunoassay - Stool, Per Rectum                Follow Up     Return if symptoms worsen or fail to improve.    Patient was given instructions and counseling regarding her condition or for health maintenance advice. Please see specific information pulled into the AVS if appropriate.     Parts of this note are electronic transcriptions/translations of spoken language to printed text using the Dragon Dictation system.          Faith Ramos, SHAILESH  12/12/2024  Answers submitted by the patient for this visit:  Primary Reason for Visit (Submitted on 12/5/2024)  What is the primary reason for your visit?: Problem Not Listed

## 2024-12-16 DIAGNOSIS — E66.9 OBESITY WITH SERIOUS COMORBIDITY, UNSPECIFIED CLASS, UNSPECIFIED OBESITY TYPE: ICD-10-CM

## 2025-01-08 ENCOUNTER — TELEPHONE (OUTPATIENT)
Dept: GASTROENTEROLOGY | Facility: CLINIC | Age: 60
End: 2025-01-08
Payer: MEDICAID

## 2025-01-08 NOTE — TELEPHONE ENCOUNTER
Spoke with patient from the cancellation list to see if she would like a sooner appointment. Patient has reschedule her appointment on 4/1/25 to 1/10/25@10:15.

## 2025-01-10 ENCOUNTER — OFFICE VISIT (OUTPATIENT)
Dept: GASTROENTEROLOGY | Facility: CLINIC | Age: 60
End: 2025-01-10
Payer: COMMERCIAL

## 2025-01-10 VITALS
HEIGHT: 67 IN | DIASTOLIC BLOOD PRESSURE: 74 MMHG | HEART RATE: 75 BPM | WEIGHT: 172.2 LBS | BODY MASS INDEX: 27.03 KG/M2 | SYSTOLIC BLOOD PRESSURE: 125 MMHG

## 2025-01-10 DIAGNOSIS — K59.04 CHRONIC IDIOPATHIC CONSTIPATION: Primary | ICD-10-CM

## 2025-01-10 RX ORDER — ESCITALOPRAM OXALATE 10 MG/1
TABLET ORAL
COMMUNITY
Start: 2024-12-31

## 2025-01-10 NOTE — PROGRESS NOTES
Chief Complaint     Rectal Bleeding and Constipation    History of Present Illness     Amber Poole is a 59 y.o. female who presents to [facility] on referral from MATEO Colvin for a gastroenterology evaluation of rectal bleeding and constipation.      She reports constipation that's been present for the past 3-4 years.  She is taking a stool softener in the morning and at night.  Recently she was started on Linzess per PCP.  This has improved constipation and she is no longer having issues.  Prior to his, was straining and seeing bleeding with bowel movements.  Stool is now most like a #4 on the bristol stool chart.      She is drinking water and Gatorade zero.       History      Past Medical History:   Diagnosis Date    Allergic 1997    Allergies     Arthritis     Arthritis 08/16/2021    Bilateral primary osteoarthritis of knee 02/22/2023    Blood clotting disorder     MTHFR-  OVER CLOTTING- TAKES ASA DAILY- SEE'S PCP    Chronic allergic rhinitis     Colon polyp     Hashimoto's thyroiditis 2022    Hemorrhoid     - RESOLVED    Hypercoagulable state     Hypothyroidism 1987    Knee pain     BILATERAL    PONV (postoperative nausea and vomiting)     Seasonal allergies     Thyroid disorder     Torn meniscus 08/2020    LEFT KNEE       Past Surgical History:   Procedure Laterality Date    COLONOSCOPY  2021    COLONOSCOPY N/A 11/9/2023    Procedure: COLONOSCOPY;  Surgeon: Brad Price MD;  Location: [facility] END[facility];  Service: General;  Laterality: N/A;    ENDOSCOPY N/A 11/9/2023    Procedure: ESOPHAGOGASTRODUODENOSCOPY;  Surgeon: Brad Price MD;  Location: [facility] ENDOSCOPY;  Service: General;  Laterality: N/A;  HIATAL HERNIA    HEMORRHOIDECTOMY      JOINT REPLACEMENT  5-26-23    Bilateral knee replacement    KNEE CARTILAGE SURGERY  2020    BEEN 08.2020    LASER ABLATION  1998    UTERINE, [place]    TONSILLECTOMY      TOTAL KNEE ARTHROPLASTY  Bilateral 05/26/2023    Procedure: BILATERAL  TOTAL KNEE ARTHROPLASTY WITH BRYANT ROBOT;  Surgeon: Carl Swartz MD;  Location: [facility] MAIN OR;  Service: Robotics - Ortho;  Laterality: Bilateral;    TUBAL ABDOMINAL LIGATION  1995       Family History   Problem Relation Age of Onset    Diabetes Mother     Kidney nephrosis Mother     Heart disease Father     Vision loss Father     Macular degeneration Father     Macular degeneration Sister     Cancer Daughter     Leukemia Daughter     Leukemia Son     Kidney nephrosis Maternal Grandmother     Liver cancer Other         UNSPECIFIED    Lung cancer Other         UNSPECIFIED    Malig Hyperthermia Neg Hx         Current Medications        Current Outpatient Medications:     amantadine (SYMMETREL) 100 MG tablet, , Disp: , Rfl:     aspirin 81 MG EC tablet, Take 1 tablet by mouth Daily., Disp: , Rfl:     atorvastatin (LIPITOR) 20 MG tablet, Take 1 tablet by mouth Daily., Disp: 90 tablet, Rfl: 1    celecoxib (CeleBREX) 100 MG capsule, Take 1 capsule by mouth 2 (Two) Times a Day., Disp: 180 capsule, Rfl: 1    docusate sodium 100 MG capsule, Take 1 capsule by mouth., Disp: , Rfl:     escitalopram (Lexapro) 10 MG tablet, , Disp: , Rfl:     famotidine (PEPCID) 20 MG tablet, Take 1 tablet by mouth 2 (Two) Times a Day., Disp: 180 tablet, Rfl: 1    L-METHYLFOLATE-METHYLCOBALAMIN PO, Take 7.5 mg by mouth Daily., Disp: , Rfl:     linaclotide (Linzess) 72 MCG capsule capsule, Take 1 capsule by mouth Every Morning Before Breakfast., Disp: 90 capsule, Rfl: 2    liothyronine (CYTOMEL) 5 MCG tablet, Take 1 tablet by mouth Daily., Disp: , Rfl:     loratadine (CLARITIN) 10 MG tablet, Take 1 tablet by mouth Daily., Disp: 90 tablet, Rfl: 1    montelukast (SINGULAIR) 10 MG tablet, Take 1 tablet by mouth every night at bedtime., Disp: 90 tablet, Rfl: 1    Synthroid 175 MCG tablet, Take 1 tablet by mouth Daily., Disp: , Rfl:     Tirzepatide-Weight Management (ZEPBOUND) 10 MG/0.5ML solution  "auto-injector, Inject 0.5 mL under the skin into the appropriate area as directed 1 (One) Time Per Week., Disp: 2 mL, Rfl: 0    traZODone (DESYREL) 50 MG tablet, 1/2-1 tab to 1 hr before bed (Patient not taking: Reported on 1/10/2025), Disp: 90 tablet, Rfl: 1     Allergies     Allergies   Allergen Reactions    Codeine Anaphylaxis and Rash    Penicillins Rash       Social History       Social History     Social History Narrative    Not on file       Immunizations     Immunization:  Immunization History   Administered Date(s) Administered    Fluzone  >6mos 09/12/2024    Influenza, Unspecified 10/01/2020    Tdap 08/06/2018          Objective     Objective     Vital Signs:   /74 (BP Location: Left arm, Patient Position: Sitting, Cuff Size: Adult)   Pulse 75   Ht 170.2 cm (67.01\")   Wt 78.1 kg (172 lb 3.2 oz)   BMI 26.96 kg/m²       Physical Exam    Results      Result Review :   The following data was reviewed by: SHAILESH Vernon on 01/10/2025:    CBC w/diff          3/12/2024    09:37 6/4/2024    11:45 9/5/2024    08:04   CBC w/Diff   WBC 7.26  10.51  7.48    RBC 4.71  5.07  4.53    Hemoglobin 13.6  14.6  12.8    Hematocrit 41.0  43.5  39.1    MCV 87.0  85.8  86.3    MCH 28.9  28.8  28.3    MCHC 33.2  33.6  32.7    RDW 12.8  11.8  12.4    Platelets 281  305  303    Neutrophil Rel % 65.5  77.3  60.9    Immature Granulocyte Rel % 0.3  0.4  0.7    Lymphocyte Rel % 19.7  11.7  21.1    Monocyte Rel % 8.0  6.5  8.7    Eosinophil Rel % 5.9  3.7  7.9    Basophil Rel % 0.6  0.4  0.7      CMP          3/12/2024    09:37 6/4/2024    11:45 9/5/2024    08:04   CMP   Glucose 90  137  87    BUN 17  10  17    Creatinine 0.67  0.65  0.81    EGFR 102.7  103.5  85.3    Sodium 141  140  141    Potassium 4.2  3.8  4.2    Chloride 107  106  107    Calcium 9.6  9.8  9.6    Total Protein 7.0   6.7    Albumin 4.4   4.1    Globulin 2.6   2.6    Total Bilirubin 0.4   0.4    Alkaline Phosphatase 77   122    AST (SGOT) 19   29  "   ALT (SGPT) 12   23    Albumin/Globulin Ratio 1.7   1.6    BUN/Creatinine Ratio 25.4  15.4  21.0    Anion Gap 10.8  12.0  10.0        Iron Profile   Iron   Date Value Ref Range Status   09/12/2024 49 37 - 145 mcg/dL Final     TIBC   Date Value Ref Range Status   09/12/2024 399 298 - 536 mcg/dL Final     Iron Saturation (TSAT)   Date Value Ref Range Status   09/12/2024 12 (L) 20 - 50 % Final     Transferrin   Date Value Ref Range Status   09/12/2024 268 200 - 360 mg/dL Final     11/9/2023 EGD (Dr. Price)-medium size hiatal hernia.  GE junction was normal, biopsy-reflux esophagitis, negative for intestinal metaplasia.  The gastric antrum was normal, biopsy-mild reactive gastropathy, negative for H. pylori.  Second portion of the duodenum was normal.  Colonoscopy-the entire examined colon was normal.  No specimens collected.    12/12/2024 occult blood stool-negative.     Assessment and Plan        Assessment and Plan    Diagnoses and all orders for this visit:    1. Chronic idiopathic constipation (Primary)  -     linaclotide (Linzess) 72 MCG capsule capsule; Take 1 capsule by mouth Every Morning Before Breakfast.  Dispense: 90 capsule; Refill: 2      Recommend to continue Linzess.  Okay to taper off of stool softener.  Patient will notify office if current dose of Linzess becomes ineffective.      Follow Up        Follow Up   Return if symptoms worsen or fail to improve, for constipation.  Patient was given instructions and counseling regarding her condition or for health maintenance advice. Please see specific information pulled into the AVS if appropriate.

## 2025-01-14 ENCOUNTER — PATIENT MESSAGE (OUTPATIENT)
Dept: FAMILY MEDICINE CLINIC | Facility: CLINIC | Age: 60
End: 2025-01-14
Payer: MEDICAID

## 2025-01-14 DIAGNOSIS — E66.9 OBESITY WITH SERIOUS COMORBIDITY, UNSPECIFIED CLASS, UNSPECIFIED OBESITY TYPE: ICD-10-CM

## 2025-01-14 DIAGNOSIS — F51.01 PRIMARY INSOMNIA: ICD-10-CM

## 2025-01-15 RX ORDER — TRAZODONE HYDROCHLORIDE 50 MG/1
TABLET, FILM COATED ORAL
Qty: 90 TABLET | Refills: 1 | Status: SHIPPED | OUTPATIENT
Start: 2025-01-15

## 2025-02-03 ENCOUNTER — OFFICE VISIT (OUTPATIENT)
Dept: ORTHOPEDIC SURGERY | Facility: CLINIC | Age: 60
End: 2025-02-03
Payer: COMMERCIAL

## 2025-02-03 VITALS
DIASTOLIC BLOOD PRESSURE: 75 MMHG | HEIGHT: 67 IN | HEART RATE: 81 BPM | BODY MASS INDEX: 24.8 KG/M2 | SYSTOLIC BLOOD PRESSURE: 114 MMHG | WEIGHT: 158 LBS | OXYGEN SATURATION: 98 %

## 2025-02-03 DIAGNOSIS — M25.512 LEFT SHOULDER PAIN, UNSPECIFIED CHRONICITY: Primary | ICD-10-CM

## 2025-02-03 DIAGNOSIS — M75.42 IMPINGEMENT SYNDROME OF LEFT SHOULDER: ICD-10-CM

## 2025-02-03 RX ORDER — TRIAMCINOLONE ACETONIDE 40 MG/ML
40 INJECTION, SUSPENSION INTRA-ARTICULAR; INTRAMUSCULAR
Status: COMPLETED | OUTPATIENT
Start: 2025-02-03 | End: 2025-02-03

## 2025-02-03 RX ORDER — LIDOCAINE HYDROCHLORIDE 10 MG/ML
5 INJECTION, SOLUTION INFILTRATION; PERINEURAL
Status: COMPLETED | OUTPATIENT
Start: 2025-02-03 | End: 2025-02-03

## 2025-02-03 RX ADMIN — LIDOCAINE HYDROCHLORIDE 5 ML: 10 INJECTION, SOLUTION INFILTRATION; PERINEURAL at 09:39

## 2025-02-03 RX ADMIN — TRIAMCINOLONE ACETONIDE 40 MG: 40 INJECTION, SUSPENSION INTRA-ARTICULAR; INTRAMUSCULAR at 09:39

## 2025-02-03 NOTE — PROGRESS NOTES
"Chief Complaint  Follow-up of the Left Shoulder    Subjective      Amber Poole presents to [facility] for follow up of their left shoulder.  She has left shoulder pain and osteoarthritis and impingement syndrome that she manages conservatively.  Patient was last seen in office on 10/2/2024 and given a left shoulder steroid injection.  She returns to clinic today stating her shoulder has been doing well.  She states that she had near full pain relief with the previous steroid injection.  She returns to clinic today requesting repeat steroid injection.    Allergies   Allergen Reactions    Codeine Anaphylaxis and Rash    Penicillins Rash       Objective     Vital Signs:   Vitals:    02/03/25 0833   BP: 114/75   Pulse: 81   SpO2: 98%   Weight: 71.7 kg (158 lb)   Height: 170.2 cm (67.01\")     Body mass index is 24.74 kg/m².    I reviewed the patient's chief complaint, history of present illness, review of systems, past medical history, surgical history, family history, social history, medications, and allergy list.     Ortho Exam  Shoulder   General: Alert. No acute distress.  Left upper Extremity: 180 degrees active elevation. External rotation to 70 degrees. Internal rotation to mid thoracic.   Demonstrates intact active elbow ROM. Demonstrates intact active wrist ROM. Sensation intact. Palpable radial pulse. Neurovascularly intact.      Large Joint: L subacromial bursa  Date/Time: 2/3/2025 9:39 AM  Consent given by: patient  Site marked: site marked  Timeout: Immediately prior to procedure a time out was called to verify the correct patient, procedure, equipment, support staff and site/side marked as required   Supporting Documentation  Indications: pain   Procedure Details  Location: shoulder - L subacromial bursa  Preparation: Patient was prepped and draped in the usual sterile fashion  Needle gauge: 21 G.  Medications administered: 40 mg triamcinolone acetonide 40 MG/ML; 5 mL " lidocaine 1 %  Patient tolerance: patient tolerated the procedure well with no immediate complications       This injection documentation was Scribed for SHAILESH Cottrell by Vesna Garcia MA.  02/03/25   09:40 EST     Imaging Results (Most Recent)       None                Assessment and Plan   Diagnoses and all orders for this visit:    1. Left shoulder pain, unspecified chronicity (Primary)    2. Impingement syndrome of left shoulder    Other orders  -     Large Joint: L subacromial bursa         Amber Poole presents today to [facility] for the follow up of their left shoulder.  She has left shoulder pain and impingement syndrome that she treats conservatively with intermittent injections..  She is eligible for repeat right shoulder steroid injection today in office and elects to proceed.     We discussed the risks, benefits and alternatives of injections. Patient was informed of possible adverse effects including but not limited to bleeding, damage to nerve, tendon or artery, increased blood sugar and increased blood pressure. Discussed possibility of a reaction from the injection.  Discussed the possibility that the injection may not completely improve or remove the pain.  Discussed the risk of infection.  Discussed the possibility of worsening pain after the injection.  Informed consent obtained.  Time out was performed. Chlorhexidine was swabbed at injection site per typical technique. Needle injected into bursa, aspiration was performed and then left shoulder steroid slowly injected into joint space, fluid was free flowing. Needle was removed, band-aid placed to injection site.  Patient tolerated injection well with no complications.     She was advised to continue her home exercises to maintain her range of motion.    Follow up in 3 months for repeat left shoulder steroid injection if needed.          Tobacco Use: Low Risk  (2/3/2025)    Patient History     Smoking Tobacco Use: Never      Smokeless Tobacco Use: Never     Passive Exposure: Not on file     Patient reports that they are a nonsmoker; cessation education not applicable.            Follow Up   No follow-ups on file.  There are no Patient Instructions on file for this visit.    Patient was given instructions and counseling regarding her condition or for health maintenance advice. Please see specific information pulled into the AVS if appropriate.     Dictated Utilizing Dragon Dictation. Please note that portions of this note were completed with a voice recognition program. Part of this note may be an electronic transcription/translation of spoken language to printed text using the Dragon Dictation System.

## 2025-02-12 DIAGNOSIS — E78.2 MIXED HYPERLIPIDEMIA: ICD-10-CM

## 2025-02-12 DIAGNOSIS — R23.2 FLUSHING: ICD-10-CM

## 2025-02-12 DIAGNOSIS — E66.9 OBESITY WITH SERIOUS COMORBIDITY, UNSPECIFIED CLASS, UNSPECIFIED OBESITY TYPE: ICD-10-CM

## 2025-02-12 RX ORDER — ESCITALOPRAM OXALATE 10 MG/1
10 TABLET ORAL DAILY
Qty: 90 TABLET | Refills: 1 | OUTPATIENT
Start: 2025-02-12

## 2025-02-12 RX ORDER — TIRZEPATIDE 10 MG/.5ML
INJECTION, SOLUTION SUBCUTANEOUS
Qty: 2 ML | Refills: 0 | OUTPATIENT
Start: 2025-02-12

## 2025-02-12 RX ORDER — ATORVASTATIN CALCIUM 20 MG/1
20 TABLET, FILM COATED ORAL DAILY
Qty: 90 TABLET | Refills: 1 | OUTPATIENT
Start: 2025-02-12

## 2025-02-13 ENCOUNTER — PATIENT MESSAGE (OUTPATIENT)
Dept: FAMILY MEDICINE CLINIC | Facility: CLINIC | Age: 60
End: 2025-02-13
Payer: COMMERCIAL

## 2025-02-13 DIAGNOSIS — E66.9 OBESITY WITH SERIOUS COMORBIDITY, UNSPECIFIED CLASS, UNSPECIFIED OBESITY TYPE: ICD-10-CM

## 2025-02-14 DIAGNOSIS — E78.2 MIXED HYPERLIPIDEMIA: ICD-10-CM

## 2025-02-14 DIAGNOSIS — F51.01 PRIMARY INSOMNIA: ICD-10-CM

## 2025-02-14 DIAGNOSIS — J30.89 SEASONAL ALLERGIC RHINITIS DUE TO OTHER ALLERGIC TRIGGER: ICD-10-CM

## 2025-02-14 DIAGNOSIS — R23.2 FLUSHING: ICD-10-CM

## 2025-02-14 DIAGNOSIS — R45.1 RESTLESS: ICD-10-CM

## 2025-02-14 RX ORDER — BUSPIRONE HYDROCHLORIDE 5 MG/1
5 TABLET ORAL
Qty: 30 TABLET | Refills: 2 | OUTPATIENT
Start: 2025-02-14

## 2025-02-14 RX ORDER — LORATADINE 10 MG/1
10 TABLET ORAL DAILY
Qty: 90 TABLET | Refills: 2 | OUTPATIENT
Start: 2025-02-14

## 2025-02-14 RX ORDER — ESCITALOPRAM OXALATE 10 MG/1
10 TABLET ORAL DAILY
Qty: 90 TABLET | Refills: 2 | OUTPATIENT
Start: 2025-02-14

## 2025-02-14 RX ORDER — ATORVASTATIN CALCIUM 20 MG/1
20 TABLET, FILM COATED ORAL DAILY
Qty: 90 TABLET | Refills: 2 | OUTPATIENT
Start: 2025-02-14

## 2025-03-04 ENCOUNTER — OFFICE VISIT (OUTPATIENT)
Dept: FAMILY MEDICINE CLINIC | Facility: CLINIC | Age: 60
End: 2025-03-04
Payer: COMMERCIAL

## 2025-03-04 VITALS
BODY MASS INDEX: 24.88 KG/M2 | OXYGEN SATURATION: 100 % | WEIGHT: 158.5 LBS | DIASTOLIC BLOOD PRESSURE: 76 MMHG | HEART RATE: 75 BPM | SYSTOLIC BLOOD PRESSURE: 119 MMHG | TEMPERATURE: 97.6 F | HEIGHT: 67 IN

## 2025-03-04 DIAGNOSIS — F51.01 PRIMARY INSOMNIA: ICD-10-CM

## 2025-03-04 DIAGNOSIS — M79.672 PAIN OF LEFT HEEL: Primary | ICD-10-CM

## 2025-03-04 DIAGNOSIS — E03.9 ACQUIRED HYPOTHYROIDISM: ICD-10-CM

## 2025-03-04 DIAGNOSIS — R05.9 COUGH: ICD-10-CM

## 2025-03-04 DIAGNOSIS — M17.12 PRIMARY OSTEOARTHRITIS OF LEFT KNEE: ICD-10-CM

## 2025-03-04 DIAGNOSIS — K44.9 HIATAL HERNIA: ICD-10-CM

## 2025-03-04 DIAGNOSIS — J30.89 SEASONAL ALLERGIC RHINITIS DUE TO OTHER ALLERGIC TRIGGER: ICD-10-CM

## 2025-03-04 DIAGNOSIS — E66.9 OBESITY WITH SERIOUS COMORBIDITY, UNSPECIFIED CLASS, UNSPECIFIED OBESITY TYPE: ICD-10-CM

## 2025-03-04 DIAGNOSIS — D64.9 ANEMIA, UNSPECIFIED TYPE: ICD-10-CM

## 2025-03-04 DIAGNOSIS — J30.2 SEASONAL ALLERGIC RHINITIS, UNSPECIFIED TRIGGER: ICD-10-CM

## 2025-03-04 DIAGNOSIS — E78.2 MIXED HYPERLIPIDEMIA: ICD-10-CM

## 2025-03-04 LAB
25(OH)D3 SERPL-MCNC: 40.3 NG/ML (ref 30–100)
ALBUMIN SERPL-MCNC: 4.1 G/DL (ref 3.5–5.2)
ALBUMIN/GLOB SERPL: 1.6 G/DL
ALP SERPL-CCNC: 88 U/L (ref 39–117)
ALT SERPL W P-5'-P-CCNC: 13 U/L (ref 1–33)
AMYLASE SERPL-CCNC: 66 U/L (ref 28–100)
ANION GAP SERPL CALCULATED.3IONS-SCNC: 11 MMOL/L (ref 5–15)
AST SERPL-CCNC: 18 U/L (ref 1–32)
BASOPHILS # BLD AUTO: 0.02 10*3/MM3 (ref 0–0.2)
BASOPHILS NFR BLD AUTO: 0.4 % (ref 0–1.5)
BILIRUB SERPL-MCNC: 0.3 MG/DL (ref 0–1.2)
BUN SERPL-MCNC: 13 MG/DL (ref 6–20)
BUN/CREAT SERPL: 17.1 (ref 7–25)
CALCIUM SPEC-SCNC: 9.5 MG/DL (ref 8.6–10.5)
CHLORIDE SERPL-SCNC: 107 MMOL/L (ref 98–107)
CHOLEST SERPL-MCNC: 124 MG/DL (ref 0–200)
CO2 SERPL-SCNC: 26 MMOL/L (ref 22–29)
CREAT SERPL-MCNC: 0.76 MG/DL (ref 0.57–1)
DEPRECATED RDW RBC AUTO: 38 FL (ref 37–54)
EGFRCR SERPLBLD CKD-EPI 2021: 91.5 ML/MIN/1.73
EOSINOPHIL # BLD AUTO: 0.23 10*3/MM3 (ref 0–0.4)
EOSINOPHIL NFR BLD AUTO: 4.1 % (ref 0.3–6.2)
ERYTHROCYTE [DISTWIDTH] IN BLOOD BY AUTOMATED COUNT: 11.9 % (ref 12.3–15.4)
FERRITIN SERPL-MCNC: 78.1 NG/ML (ref 13–150)
FOLATE SERPL-MCNC: >20 NG/ML (ref 4.78–24.2)
GLOBULIN UR ELPH-MCNC: 2.6 GM/DL
GLUCOSE SERPL-MCNC: 85 MG/DL (ref 65–99)
HBA1C MFR BLD: 5.5 % (ref 4.8–5.6)
HCT VFR BLD AUTO: 40.3 % (ref 34–46.6)
HDLC SERPL QL: 3.26
HDLC SERPL-MCNC: 38 MG/DL (ref 40–60)
HGB BLD-MCNC: 13.5 G/DL (ref 12–15.9)
IMM GRANULOCYTES # BLD AUTO: 0.02 10*3/MM3 (ref 0–0.05)
IMM GRANULOCYTES NFR BLD AUTO: 0.4 % (ref 0–0.5)
IRON 24H UR-MRATE: 39 MCG/DL (ref 37–145)
IRON SATN MFR SERPL: 13 % (ref 20–50)
LDLC SERPL CALC-MCNC: 71 MG/DL (ref 0–100)
LIPASE SERPL-CCNC: 25 U/L (ref 13–60)
LYMPHOCYTES # BLD AUTO: 1.39 10*3/MM3 (ref 0.7–3.1)
LYMPHOCYTES NFR BLD AUTO: 24.7 % (ref 19.6–45.3)
MCH RBC QN AUTO: 29.2 PG (ref 26.6–33)
MCHC RBC AUTO-ENTMCNC: 33.5 G/DL (ref 31.5–35.7)
MCV RBC AUTO: 87.2 FL (ref 79–97)
MONOCYTES # BLD AUTO: 0.38 10*3/MM3 (ref 0.1–0.9)
MONOCYTES NFR BLD AUTO: 6.7 % (ref 5–12)
NEUTROPHILS NFR BLD AUTO: 3.59 10*3/MM3 (ref 1.7–7)
NEUTROPHILS NFR BLD AUTO: 63.7 % (ref 42.7–76)
NRBC BLD AUTO-RTO: 0 /100 WBC (ref 0–0.2)
PLATELET # BLD AUTO: 265 10*3/MM3 (ref 140–450)
PMV BLD AUTO: 10.7 FL (ref 6–12)
POTASSIUM SERPL-SCNC: 4.5 MMOL/L (ref 3.5–5.2)
PROT SERPL-MCNC: 6.7 G/DL (ref 6–8.5)
RBC # BLD AUTO: 4.62 10*6/MM3 (ref 3.77–5.28)
SODIUM SERPL-SCNC: 144 MMOL/L (ref 136–145)
TIBC SERPL-MCNC: 301 MCG/DL (ref 298–536)
TRANSFERRIN SERPL-MCNC: 202 MG/DL (ref 200–360)
TRIGL SERPL-MCNC: 75 MG/DL (ref 0–150)
TSH SERPL DL<=0.05 MIU/L-ACNC: 0.19 UIU/ML (ref 0.27–4.2)
VIT B12 BLD-MCNC: 1811 PG/ML (ref 211–946)
VLDLC SERPL-MCNC: 15 MG/DL (ref 5–40)
WBC NRBC COR # BLD AUTO: 5.63 10*3/MM3 (ref 3.4–10.8)

## 2025-03-04 PROCEDURE — 82746 ASSAY OF FOLIC ACID SERUM: CPT | Performed by: NURSE PRACTITIONER

## 2025-03-04 PROCEDURE — 83540 ASSAY OF IRON: CPT | Performed by: NURSE PRACTITIONER

## 2025-03-04 PROCEDURE — 82150 ASSAY OF AMYLASE: CPT | Performed by: NURSE PRACTITIONER

## 2025-03-04 PROCEDURE — 83036 HEMOGLOBIN GLYCOSYLATED A1C: CPT | Performed by: NURSE PRACTITIONER

## 2025-03-04 PROCEDURE — 82728 ASSAY OF FERRITIN: CPT | Performed by: NURSE PRACTITIONER

## 2025-03-04 PROCEDURE — 80050 GENERAL HEALTH PANEL: CPT | Performed by: NURSE PRACTITIONER

## 2025-03-04 PROCEDURE — 82607 VITAMIN B-12: CPT | Performed by: NURSE PRACTITIONER

## 2025-03-04 PROCEDURE — 99214 OFFICE O/P EST MOD 30 MIN: CPT | Performed by: NURSE PRACTITIONER

## 2025-03-04 PROCEDURE — 82306 VITAMIN D 25 HYDROXY: CPT | Performed by: NURSE PRACTITIONER

## 2025-03-04 PROCEDURE — 83690 ASSAY OF LIPASE: CPT | Performed by: NURSE PRACTITIONER

## 2025-03-04 PROCEDURE — 80061 LIPID PANEL: CPT | Performed by: NURSE PRACTITIONER

## 2025-03-04 PROCEDURE — 84466 ASSAY OF TRANSFERRIN: CPT | Performed by: NURSE PRACTITIONER

## 2025-03-04 RX ORDER — MONTELUKAST SODIUM 10 MG/1
10 TABLET ORAL
Qty: 90 TABLET | Refills: 1 | Status: SHIPPED | OUTPATIENT
Start: 2025-03-04

## 2025-03-04 RX ORDER — LORATADINE 10 MG/1
10 TABLET ORAL DAILY
Qty: 90 TABLET | Refills: 1 | Status: SHIPPED | OUTPATIENT
Start: 2025-03-04

## 2025-03-04 RX ORDER — TRAZODONE HYDROCHLORIDE 50 MG/1
TABLET ORAL
Qty: 90 TABLET | Refills: 1 | Status: SHIPPED | OUTPATIENT
Start: 2025-03-04

## 2025-03-04 RX ORDER — FAMOTIDINE 20 MG/1
20 TABLET, FILM COATED ORAL 2 TIMES DAILY
Qty: 180 TABLET | Refills: 1 | Status: SHIPPED | OUTPATIENT
Start: 2025-03-04

## 2025-03-04 RX ORDER — CELECOXIB 100 MG/1
100 CAPSULE ORAL 2 TIMES DAILY
Qty: 180 CAPSULE | Refills: 1 | Status: SHIPPED | OUTPATIENT
Start: 2025-03-04

## 2025-03-04 RX ORDER — ATORVASTATIN CALCIUM 20 MG/1
20 TABLET, FILM COATED ORAL DAILY
Qty: 90 TABLET | Refills: 1 | Status: SHIPPED | OUTPATIENT
Start: 2025-03-04

## 2025-03-04 NOTE — ASSESSMENT & PLAN NOTE
Orders:    montelukast (SINGULAIR) 10 MG tablet; Take 1 tablet by mouth every night at bedtime.

## 2025-03-04 NOTE — PROGRESS NOTES
Chief Complaint  Hyperlipidemia and Hypothyroidism (6 month follow up)    Subjective          Amber Poole presents to [facility]  History of Present Illness    History of Present Illness  The patient presents for weight management, left ankle pain, hypothyroidism, hyperlipidemia, and health maintenance.    She has been adhering to a fasting regimen today and reports a significant improvement in her overall health status. She expresses satisfaction with her current weight and does not wish to lose further. She maintains a balanced diet, inclusive of fruits and vegetables, and engages in regular physical activity. She is currently on a 10 mg dose of Zepbound and requires a refill of this medication. She is concerned about the potential need to increase her Zepbound dosage to 12.5 mg for maintenance, as she fears it may lead to further weight loss, which she does not desire.    She experiences difficulty in ambulation due to a burning sensation in her left ankle upon standing after prolonged sitting. This discomfort subsides with movement. She has not sought consultation with a podiatrist for this issue. The onset of these symptoms was approximately 3 weeks ago.    She continues to be under the care of Dr. MONTGOMERY for her thyroid condition and is on a regimen of Synthroid 175 mcg and Cytomel 5 mcg.    She anticipates a reduction in her cholesterol levels and expresses hope for a subsequent decrease in her cholesterol medication dosage.    She is fasting today for blood work. She is taking Linzess for constipation, vitamins, and aspirin. She is not taking Symmetrel.    Supplemental Information  She is still taking Singulair every night, Claritin every night, Pepcid twice a day, Celebrex for her joints twice a day, and Lipitor. She is not having any major joint issues. She is not experiencing any chest pain. She is sleeping well with her sleeping medicine and needs a refill. She is  still taking Lexapro.    MEDICATIONS  Singulair, Claritin, Pepcid, Celebrex, Lipitor, Synthroid, Cytomel, Lexapro, Linzess, vitamins, aspirin.      Patient or patient representative verbalized consent for the use of Ambient Listening during the visit with  SHAILESH Colvin for chart documentation. 3/4/2025  12:55 EST      Depression: Not at risk (3/4/2025)    PHQ-2     PHQ-2 Score: 0    and     BMI is within normal parameters. No other follow-up for BMI required.         Allergies  Codeine and Penicillins    Social History     Tobacco Use    Smoking status: Never    Smokeless tobacco: Never   Vaping Use    Vaping status: Never Used   Substance Use Topics    Alcohol use: Never    Drug use: Never       Family History   Problem Relation Age of Onset    Diabetes Mother     Kidney nephrosis Mother     Heart disease Father     Vision loss Father     Macular degeneration Father     Macular degeneration Sister     Cancer Daughter     Leukemia Daughter     Leukemia Son     Kidney nephrosis Maternal Grandmother     Liver cancer Other         UNSPECIFIED    Lung cancer Other         UNSPECIFIED    Malig Hyperthermia Neg Hx         Health Maintenance Due   Topic Date Due    Pneumococcal Vaccine 50+ (1 of 1 - PCV) Never done    PAP SMEAR  12/06/2024        Immunization History   Administered Date(s) Administered    Fluzone  >6mos 09/12/2024    Influenza, Unspecified 10/01/2020    Tdap 08/06/2018       Review of Systems   Constitutional:  Negative for chills, diaphoresis, fatigue and fever.   HENT:  Negative for congestion, sore throat and swollen glands.    Respiratory:  Negative for cough.    Cardiovascular:  Negative for chest pain.   Gastrointestinal:  Negative for abdominal pain, nausea and vomiting.   Genitourinary:  Negative for dysuria.   Musculoskeletal:  Negative for myalgias and neck pain.   Skin:  Negative for rash.   Neurological:  Negative for weakness and numbness.        Objective       Vitals:     "03/04/25 0849   BP: 119/76   BP Location: Left arm   Patient Position: Sitting   Cuff Size: Adult   Pulse: 75   Temp: 97.6 °F (36.4 °C)   SpO2: 100%   Weight: 71.9 kg (158 lb 8 oz)   Height: 170.2 cm (67.01\")       Body mass index is 24.82 kg/m².         Physical Exam  Vitals reviewed.   Constitutional:       Appearance: Normal appearance. She is well-developed.   Cardiovascular:      Rate and Rhythm: Normal rate and regular rhythm.      Heart sounds: Normal heart sounds. No murmur heard.  Pulmonary:      Effort: Pulmonary effort is normal.      Breath sounds: Normal breath sounds.   Neurological:      Mental Status: She is alert and oriented to person, place, and time.      Cranial Nerves: No cranial nerve deficit.      Motor: No weakness.   Psychiatric:         Mood and Affect: Mood and affect normal.           Physical Exam      Vital Signs  BMI is 24. Weight is 158.              Result Review :     The following data was reviewed by: SHAILESH Colvin on 03/04/2025:    Common Labs   Common labs          9/5/2024    08:04 9/12/2024    09:49 10/11/2024    12:44   Common Labs   Glucose 87      BUN 17      Creatinine 0.81      Sodium 141      Potassium 4.2      Chloride 107      Calcium 9.6      Albumin 4.1      Total Bilirubin 0.4      Alkaline Phosphatase 122      AST (SGOT) 29      ALT (SGPT) 23      WBC 7.48      Hemoglobin 12.8      Hematocrit 39.1      Platelets 303      Total Cholesterol  156     Triglycerides  93     HDL Cholesterol  48     LDL Cholesterol   91     Hemoglobin A1C   5.30            No Images in the past 120 days found..         Results                      Assessment and Plan      Assessment & Plan  Obesity with serious comorbidity, unspecified class, unspecified obesity type    Orders:    Tirzepatide-Weight Management (ZEPBOUND) 10 MG/0.5ML solution auto-injector; Inject 0.5 mL under the skin into the appropriate area as directed 1 (One) Time Per Week.    Amylase    Lipase    " Hemoglobin A1c    Cough    Orders:    montelukast (SINGULAIR) 10 MG tablet; Take 1 tablet by mouth every night at bedtime.    Seasonal allergic rhinitis, unspecified trigger    Orders:    montelukast (SINGULAIR) 10 MG tablet; Take 1 tablet by mouth every night at bedtime.    Seasonal allergic rhinitis due to other allergic trigger    Orders:    loratadine (CLARITIN) 10 MG tablet; Take 1 tablet by mouth Daily.    Hiatal hernia    Orders:    famotidine (PEPCID) 20 MG tablet; Take 1 tablet by mouth 2 (Two) Times a Day.    Primary osteoarthritis of left knee    Orders:    celecoxib (CeleBREX) 100 MG capsule; Take 1 capsule by mouth 2 (Two) Times a Day.    Mixed hyperlipidemia       Orders:    atorvastatin (LIPITOR) 20 MG tablet; Take 1 tablet by mouth Daily.    CBC Auto Differential    Comprehensive Metabolic Panel    Lipid Panel With / Chol / HDL Ratio    Pain of left heel  We are going to do a referral to podiatry due to the potential of the Achilles.  Orders:    Ambulatory Referral to Podiatry    Primary insomnia    Orders:    traZODone (DESYREL) 50 MG tablet; 1/2-1 tab to 1 hr before bed    Acquired hypothyroidism    Orders:    TSH    Anemia, unspecified type    Orders:    Vitamin D,25-Hydroxy    Vitamin B12 & Folate    Iron Profile    Ferritin       Diagnosis Plan   1. Pain of left heel  Ambulatory Referral to Podiatry      2. Obesity with serious comorbidity, unspecified class, unspecified obesity type  Tirzepatide-Weight Management (ZEPBOUND) 10 MG/0.5ML solution auto-injector    Amylase    Lipase    Hemoglobin A1c      3. Cough  montelukast (SINGULAIR) 10 MG tablet      4. Seasonal allergic rhinitis, unspecified trigger  montelukast (SINGULAIR) 10 MG tablet      5. Seasonal allergic rhinitis due to other allergic trigger  loratadine (CLARITIN) 10 MG tablet      6. Hiatal hernia  famotidine (PEPCID) 20 MG tablet      7. Primary osteoarthritis of left knee  celecoxib (CeleBREX) 100 MG capsule      8. Mixed  hyperlipidemia  atorvastatin (LIPITOR) 20 MG tablet    CBC Auto Differential    Comprehensive Metabolic Panel    Lipid Panel With / Chol / HDL Ratio      9. Primary insomnia  traZODone (DESYREL) 50 MG tablet      10. Acquired hypothyroidism  TSH      11. Anemia, unspecified type  Vitamin D,25-Hydroxy    Vitamin B12 & Folate    Iron Profile    Ferritin            Assessment & Plan  1. Weight management.  Her BMI is within the optimal range at 24, with a total weight loss of approximately 30 pounds. She has been on a 10 mg dose of Zepbound, which is a continuous weight loss medication. The goal is to reduce her dosage to 5 mg while maintaining her current weight. She is advised to maintain a balanced diet rich in fruits, vegetables, and lean proteins, and to engage in regular exercise for at least 150 minutes per week. She is also encouraged to monitor her weight at home on a weekly basis. A refill of Zepbound 10 mg will be provided today. She will continue on the 10 mg dose until prior authorization is obtained. If insurance requires an increase to 12.5 mg for maintenance, the dosage interval may be extended to 10 days to prevent further weight loss.    2. Left ankle pain.  Her symptoms suggest a possible Achilles tendon issue, potentially indicative of arthritis. A referral to a podiatrist will be made to rule out the presence of spurring on the Achilles tendon.    3. Hypothyroidism.  She is currently under the care of Dr. Bar and is on a regimen of Synthroid 175 mcg and Cytomel 5 mcg. She will continue with her current thyroid medication regimen.    4. Hyperlipidemia.  She will continue her current cholesterol medication regimen. A comprehensive blood work panel will be ordered to monitor her cholesterol levels. If her cholesterol levels have decreased, a reduction in her cholesterol medication dosage will be considered.    5. Health maintenance.  A comprehensive blood work panel will be ordered to assess her iron  levels, kidney function, liver function, vitamin D and B levels, calcium, and potassium. Her medication list will be updated to reflect the discontinuation of amantadine.          Follow Up     Return in about 6 months (around 9/4/2025).    Patient was given instructions and counseling regarding her condition or for health maintenance advice. Please see specific information pulled into the AVS if appropriate.     Parts of this note are electronic transcriptions/translations of spoken language to printed text using the Dragon Dictation system.          Faith Ramos, SHAILESH  03/04/2025

## 2025-03-07 DIAGNOSIS — R23.2 FLUSHING: ICD-10-CM

## 2025-03-07 RX ORDER — ESCITALOPRAM OXALATE 10 MG/1
10 TABLET ORAL DAILY
Qty: 90 TABLET | Refills: 1 | Status: SHIPPED | OUTPATIENT
Start: 2025-03-07

## 2025-03-14 ENCOUNTER — PATIENT ROUNDING (BHMG ONLY) (OUTPATIENT)
Dept: FAMILY MEDICINE CLINIC | Facility: CLINIC | Age: 60
End: 2025-03-14
Payer: COMMERCIAL

## 2025-05-05 ENCOUNTER — OFFICE VISIT (OUTPATIENT)
Dept: ORTHOPEDIC SURGERY | Facility: CLINIC | Age: 60
End: 2025-05-05
Payer: COMMERCIAL

## 2025-05-05 VITALS
OXYGEN SATURATION: 96 % | SYSTOLIC BLOOD PRESSURE: 115 MMHG | BODY MASS INDEX: 24.01 KG/M2 | HEIGHT: 67 IN | WEIGHT: 153 LBS | HEART RATE: 79 BPM | DIASTOLIC BLOOD PRESSURE: 94 MMHG

## 2025-05-05 DIAGNOSIS — E66.9 OBESITY WITH SERIOUS COMORBIDITY, UNSPECIFIED CLASS, UNSPECIFIED OBESITY TYPE: ICD-10-CM

## 2025-05-05 DIAGNOSIS — M75.42 IMPINGEMENT SYNDROME OF LEFT SHOULDER: Primary | ICD-10-CM

## 2025-05-05 DIAGNOSIS — M25.512 LEFT SHOULDER PAIN, UNSPECIFIED CHRONICITY: ICD-10-CM

## 2025-05-05 PROCEDURE — 20610 DRAIN/INJ JOINT/BURSA W/O US: CPT

## 2025-05-05 RX ORDER — LIDOCAINE HYDROCHLORIDE 10 MG/ML
5 INJECTION, SOLUTION EPIDURAL; INFILTRATION; INTRACAUDAL; PERINEURAL
Status: COMPLETED | OUTPATIENT
Start: 2025-05-05 | End: 2025-05-05

## 2025-05-05 RX ORDER — TIRZEPATIDE 10 MG/.5ML
INJECTION, SOLUTION SUBCUTANEOUS
Qty: 2 ML | Refills: 1 | Status: SHIPPED | OUTPATIENT
Start: 2025-05-05

## 2025-05-05 RX ORDER — TRIAMCINOLONE ACETONIDE 40 MG/ML
40 INJECTION, SUSPENSION INTRA-ARTICULAR; INTRAMUSCULAR
Status: COMPLETED | OUTPATIENT
Start: 2025-05-05 | End: 2025-05-05

## 2025-05-05 RX ADMIN — LIDOCAINE HYDROCHLORIDE 5 ML: 10 INJECTION, SOLUTION EPIDURAL; INFILTRATION; INTRACAUDAL; PERINEURAL at 09:17

## 2025-05-05 RX ADMIN — TRIAMCINOLONE ACETONIDE 40 MG: 40 INJECTION, SUSPENSION INTRA-ARTICULAR; INTRAMUSCULAR at 09:17

## 2025-05-05 NOTE — PROGRESS NOTES
"Chief Complaint  Follow-up of the Left Shoulder    Subjective      Amber Poole presents to [facility]     History of Present Illness  She is here today following up on her left shoulder.  Patient has left shoulder pain, osteoarthritis as well as impingement syndrome that she manages conservatively.  She was last seen in office on 2/3/25 and received a left shoulder steroid injection.    She reports a recurrence of shoulder discomfort that began approximately 7 to 10 days ago. There have been no recent injuries or falls. The range of motion is limited, with pain manifesting upon full elevation of the arm. Discomfort is noted during abduction and external rotation.      Allergies   Allergen Reactions    Codeine Anaphylaxis and Rash    Penicillins Rash       Objective     Vital Signs:   Vitals:    05/05/25 0809   BP: 115/94   BP Location: Left arm   Patient Position: Sitting   Cuff Size: Adult   Pulse: 79   SpO2: 96%   Weight: 69.4 kg (153 lb)   Height: 170.2 cm (67.01\")     Body mass index is 23.96 kg/m².    I reviewed the patient's chief complaint, history of present illness, review of systems, past medical history, surgical history, family history, social history, medications, and allergy list.     Ortho Exam    General: Alert. No acute distress.  Left Upper Extremity:  tender to palpation around the humeral head. No skin discoloration, atrophy, or swelling.   170 degrees active elevation with pain. External rotation to 70 degrees.  160 degrees active shoulder abduction with discomfort. Demonstrates intact active elbow ROM. Demonstrates intact active wrist ROM. Sensation intact. Palpable radial pulse. Neurovascularly intact.          Large Joint: L subacromial bursa  Date/Time: 5/5/2025 9:17 AM  Consent given by: patient  Site marked: site marked  Timeout: Immediately prior to procedure a time out was called to verify the correct patient, procedure, equipment, support staff and " site/side marked as required   Supporting Documentation  Indications: pain   Procedure Details  Location: shoulder - L subacromial bursa  Preparation: Patient was prepped and draped in the usual sterile fashion  Needle gauge: 21 G.  Medications administered: 40 mg triamcinolone acetonide 40 MG/ML; 5 mL lidocaine PF 1% 1 %  Patient tolerance: patient tolerated the procedure well with no immediate complications     This injection documentation was Scribed for SHAILESH Cottrell by Vesna Garcia MA.  05/05/25   09:18 EDT     Imaging Results (Most Recent)       None             Results           Assessment and Plan   Diagnoses and all orders for this visit:    1. Impingement syndrome of left shoulder (Primary)    2. Left shoulder pain, unspecified chronicity         Amber Poole presents today to [facility] for the follow up of their left shoulder. They have left shoulder pain, impingement syndrome as well as osteoarthritis that we have been treating conservatively with intermittent injections. Patient elected to proceed with repeat left shoulder steroid injections. Patient is previously aware of the risks, benefits and alternatives of injections. Patient was informed of possible adverse effects including but not limited to bleeding, damage to nerve, tendon or artery, increased blood sugar and increased blood pressure. Discussed possibility of a reaction from the injection.  Discussed the possibility that the injection may not completely improve or remove the pain.  Discussed the risk of infection.  Discussed the possibility of worsening pain after the injection.  Informed consent obtained.  Time out was performed. Chlorhexidine was swabbed at injection site per typical technique. Needle injected into bursa, aspiration was performed and then left shoulder steroid slowly injected into joint space, fluid was free flowing. Needle was removed, band-aid placed to injection site.  Patient tolerated  injection well with no complications. Additional information was provided on at checkout.     Follow up as needed  Eligible for repeat left injection(s) on/after August 6.              Tobacco Use: Low Risk  (5/5/2025)    Patient History     Smoking Tobacco Use: Never     Smokeless Tobacco Use: Never     Passive Exposure: Not on file     Patient reports that they are a nonsmoker; cessation education not applicable.     BMI is within normal parameters. No other follow-up for BMI required.      Follow Up   Return in about 3 months (around 8/5/2025).  There are no Patient Instructions on file for this visit.    Patient was given instructions and counseling regarding her condition or for health maintenance advice. Please see specific information pulled into the AVS if appropriate.     Patient or patient representative verbalized consent for the use of Ambient Listening during the visit with  SHAILESH Cottrell for chart documentation. 5/5/2025  08:44 EDT    Dictated Utilizing Dragon Dictation. Please note that portions of this note were completed with a voice recognition program. Part of this note may be an electronic transcription/translation of spoken language to printed text using the Dragon Dictation System.

## 2025-05-21 ENCOUNTER — TELEPHONE (OUTPATIENT)
Dept: FAMILY MEDICINE CLINIC | Facility: CLINIC | Age: 60
End: 2025-05-21
Payer: COMMERCIAL

## 2025-05-21 NOTE — TELEPHONE ENCOUNTER
[facility] requesting Zepbound for 90 days. Patient will not have insurance July. Insurance will cover 90 days. She has 1 refill for 1 month now they will cancel that one. Per Mary

## 2025-05-27 ENCOUNTER — LAB (OUTPATIENT)
Dept: LAB | Facility: HOSPITAL | Age: 60
End: 2025-05-27
Payer: COMMERCIAL

## 2025-05-27 ENCOUNTER — TRANSCRIBE ORDERS (OUTPATIENT)
Dept: LAB | Facility: HOSPITAL | Age: 60
End: 2025-05-27
Payer: COMMERCIAL

## 2025-05-27 DIAGNOSIS — E03.9 ACQUIRED HYPOTHYROIDISM: ICD-10-CM

## 2025-05-27 DIAGNOSIS — E03.9 ACQUIRED HYPOTHYROIDISM: Primary | ICD-10-CM

## 2025-05-27 LAB
T3FREE SERPL-MCNC: 3.13 PG/ML (ref 2–4.4)
T4 FREE SERPL-MCNC: 1.44 NG/DL (ref 0.92–1.68)
TSH SERPL DL<=0.05 MIU/L-ACNC: 0.36 UIU/ML (ref 0.27–4.2)

## 2025-05-27 PROCEDURE — 36415 COLL VENOUS BLD VENIPUNCTURE: CPT

## 2025-05-27 PROCEDURE — 84443 ASSAY THYROID STIM HORMONE: CPT

## 2025-05-27 PROCEDURE — 84439 ASSAY OF FREE THYROXINE: CPT

## 2025-05-27 PROCEDURE — 84481 FREE ASSAY (FT-3): CPT

## 2025-06-05 ENCOUNTER — TELEPHONE (OUTPATIENT)
Dept: FAMILY MEDICINE CLINIC | Facility: CLINIC | Age: 60
End: 2025-06-05
Payer: COMMERCIAL

## 2025-06-05 NOTE — TELEPHONE ENCOUNTER
[facility] called wanting to know if they can add the refills from the 5/21 script to the 5/5 script because pts insurance is no longer going to pay for zepbound starting July 1. On 5/5 they only billed for 1 month and they can go in a re bill for 5/5 for 3 months if they have enough refills.     Katherinejulissa said you could give her a call if you had any questions.

## 2025-06-06 ENCOUNTER — TELEPHONE (OUTPATIENT)
Dept: FAMILY MEDICINE CLINIC | Facility: CLINIC | Age: 60
End: 2025-06-06
Payer: COMMERCIAL

## 2025-06-06 NOTE — TELEPHONE ENCOUNTER
PER CALL BACK FROM [facility] - NEVER MIND THEY ARE GOING TO HAVE THE PATIENT CONTACT HER INSURANCE COMPANY          THIS GOES WITH OTHER [facility] FOR THIS PT - WOULDN'T LET ME ADD ANYTHING TO IT    CAN YOU CANCEL ZEPBOUND FROM 5/21 AND VERBALLY ADD 1 REFILL TO THE SCRIPT FROM 5/5 SO OMER CAN GO BACK AND REBILL FROM 1 MONTH TO THREE MONTHS - INS IS BEING A PAIN ON THIS     OMER WILL ONLY BE THERE TIL 4:30 TODAY AND THEN GONE FOR 2 WEEKS - SHE IS GOING TO LET ANA KNOW WHAT IS GOING ON ALSO

## 2025-08-11 ENCOUNTER — OFFICE VISIT (OUTPATIENT)
Dept: ORTHOPEDIC SURGERY | Facility: CLINIC | Age: 60
End: 2025-08-11
Payer: COMMERCIAL

## 2025-08-11 VITALS
BODY MASS INDEX: 24.01 KG/M2 | HEIGHT: 67 IN | WEIGHT: 153 LBS | SYSTOLIC BLOOD PRESSURE: 128 MMHG | DIASTOLIC BLOOD PRESSURE: 86 MMHG | HEART RATE: 70 BPM | OXYGEN SATURATION: 97 %

## 2025-08-11 DIAGNOSIS — M75.42 IMPINGEMENT SYNDROME OF LEFT SHOULDER: Primary | ICD-10-CM

## 2025-08-11 DIAGNOSIS — M75.112 NONTRAUMATIC INCOMPLETE TEAR OF LEFT ROTATOR CUFF: ICD-10-CM

## 2025-08-11 PROCEDURE — 20610 DRAIN/INJ JOINT/BURSA W/O US: CPT

## 2025-08-11 RX ORDER — LIDOCAINE HYDROCHLORIDE 10 MG/ML
5 INJECTION, SOLUTION INFILTRATION; PERINEURAL
Status: COMPLETED | OUTPATIENT
Start: 2025-08-11 | End: 2025-08-11

## 2025-08-11 RX ORDER — TRIAMCINOLONE ACETONIDE 40 MG/ML
40 INJECTION, SUSPENSION INTRA-ARTICULAR; INTRAMUSCULAR
Status: COMPLETED | OUTPATIENT
Start: 2025-08-11 | End: 2025-08-11

## 2025-08-11 RX ADMIN — LIDOCAINE HYDROCHLORIDE 5 ML: 10 INJECTION, SOLUTION INFILTRATION; PERINEURAL at 08:10

## 2025-08-11 RX ADMIN — TRIAMCINOLONE ACETONIDE 40 MG: 40 INJECTION, SUSPENSION INTRA-ARTICULAR; INTRAMUSCULAR at 08:10

## 2025-08-25 ENCOUNTER — OFFICE VISIT (OUTPATIENT)
Age: 60
End: 2025-08-25
Payer: COMMERCIAL

## 2025-08-25 VITALS
BODY MASS INDEX: 24.43 KG/M2 | WEIGHT: 155.65 LBS | SYSTOLIC BLOOD PRESSURE: 146 MMHG | HEIGHT: 67 IN | TEMPERATURE: 98.1 F | DIASTOLIC BLOOD PRESSURE: 89 MMHG | HEART RATE: 70 BPM

## 2025-08-25 DIAGNOSIS — B02.29 OTHER POSTHERPETIC NERVOUS SYSTEM INVOLVEMENT: ICD-10-CM

## 2025-08-25 DIAGNOSIS — R20.8 BURNING SENSATION: ICD-10-CM

## 2025-08-25 DIAGNOSIS — R20.0 NUMBNESS AND TINGLING OF RIGHT ARM: Primary | ICD-10-CM

## 2025-08-25 DIAGNOSIS — R20.2 NUMBNESS AND TINGLING OF RIGHT ARM: Primary | ICD-10-CM

## 2025-08-25 PROCEDURE — 96372 THER/PROPH/DIAG INJ SC/IM: CPT | Performed by: NURSE PRACTITIONER

## 2025-08-25 PROCEDURE — 99214 OFFICE O/P EST MOD 30 MIN: CPT | Performed by: NURSE PRACTITIONER

## 2025-08-25 RX ORDER — METHYLPREDNISOLONE SODIUM SUCCINATE 125 MG/2ML
80 INJECTION, POWDER, LYOPHILIZED, FOR SOLUTION INTRAMUSCULAR; INTRAVENOUS EVERY 6 HOURS
Status: DISCONTINUED | OUTPATIENT
Start: 2025-08-25 | End: 2025-08-25

## 2025-08-25 RX ORDER — LIDOCAINE 50 MG/G
1 OINTMENT TOPICAL
Qty: 240 G | Refills: 0 | Status: SHIPPED | OUTPATIENT
Start: 2025-08-25

## 2025-08-25 RX ORDER — METHYLPREDNISOLONE SODIUM SUCCINATE 125 MG/2ML
80 INJECTION, POWDER, LYOPHILIZED, FOR SOLUTION INTRAMUSCULAR; INTRAVENOUS ONCE
Status: COMPLETED | OUTPATIENT
Start: 2025-08-25 | End: 2025-08-25

## 2025-08-25 RX ORDER — VALACYCLOVIR HYDROCHLORIDE 1 G/1
1000 TABLET, FILM COATED ORAL 3 TIMES DAILY
Qty: 21 TABLET | Refills: 0 | Status: SHIPPED | OUTPATIENT
Start: 2025-08-25 | End: 2025-09-01

## 2025-08-25 RX ORDER — PREDNISONE 20 MG/1
TABLET ORAL
Qty: 18 TABLET | Refills: 0 | Status: SHIPPED | OUTPATIENT
Start: 2025-08-25

## 2025-08-25 RX ORDER — INDOMETHACIN 50 MG/1
50 CAPSULE ORAL 3 TIMES DAILY PRN
Qty: 15 CAPSULE | Refills: 0 | Status: SHIPPED | OUTPATIENT
Start: 2025-08-25

## 2025-08-25 RX ORDER — VALACYCLOVIR HYDROCHLORIDE 500 MG/1
TABLET, FILM COATED ORAL
Qty: 90 TABLET | Refills: 0 | Status: SHIPPED | OUTPATIENT
Start: 2025-08-25

## 2025-08-25 RX ADMIN — METHYLPREDNISOLONE SODIUM SUCCINATE 80 MG: 125 INJECTION, POWDER, LYOPHILIZED, FOR SOLUTION INTRAMUSCULAR; INTRAVENOUS at 09:46

## (undated) DEVICE — SINGLE-USE BIOPSY FORCEPS: Brand: RADIAL JAW 4

## (undated) DEVICE — CVR LEG BOOTLEG F/R NOSKID 33IN

## (undated) DEVICE — DRSNG GZ PETROLTM XEROFORM CURAD 1X8IN STRL

## (undated) DEVICE — SYR LUERLOK 30CC

## (undated) DEVICE — SUT VIC 0 CT1 36IN J946H

## (undated) DEVICE — DISPOSABLE TOURNIQUET CUFF SINGLE BLADDER, SINGLE PORT AND QUICK CONNECT CONNECTOR: Brand: COLOR CUFF

## (undated) DEVICE — MAT FLR ABS W/BLU/LINER 56X72IN WHT

## (undated) DEVICE — Device: Brand: DEFENDO AIR/WATER/SUCTION AND BIOPSY VALVE

## (undated) DEVICE — SOL IRR H2O BTL 1000ML STRL

## (undated) DEVICE — GLV SURG SENSICARE SLT PF LF 7 STRL

## (undated) DEVICE — BASIC SINGLE BASIN-LF: Brand: MEDLINE INDUSTRIES, INC.

## (undated) DEVICE — BLCK/BITE BLOX WO/DENTL/RIM W/STRAP 54F

## (undated) DEVICE — PENCL E/S SMOKEEVAC W/TELESCP CANN

## (undated) DEVICE — UNDYED BRAIDED (POLYGLACTIN 910), SYNTHETIC ABSORBABLE SUTURE: Brand: COATED VICRYL

## (undated) DEVICE — TUBING, SUCTION, 1/4" X 10', STRAIGHT: Brand: MEDLINE

## (undated) DEVICE — NDL HYPO ECLPS SFTY 18G 1 1/2IN

## (undated) DEVICE — SCRW HEX PERSONA FML 2.5X25MM PK/2
Type: IMPLANTABLE DEVICE | Site: KNEE | Status: NON-FUNCTIONAL
Removed: 2023-05-26

## (undated) DEVICE — Device

## (undated) DEVICE — PULLOVER TOGA, 2X LARGE: Brand: FLYTE, SURGICOOL

## (undated) DEVICE — INTENDED FOR TISSUE SEPARATION, AND OTHER PROCEDURES THAT REQUIRE A SHARP SURGICAL BLADE TO PUNCTURE OR CUT.: Brand: BARD-PARKER ® CARBON RIB-BACK BLADES

## (undated) DEVICE — DRSNG SURESITE WNDW 4X4.5

## (undated) DEVICE — SUT ETHIB 1 X538H ETX538H

## (undated) DEVICE — SOLIDIFIER LIQLOC PLS 1500CC BT

## (undated) DEVICE — SLV SCD KN/LEN ADJ EXPRSS BLENDED MD 1P/U

## (undated) DEVICE — TOWEL,OR,DSP,ST,BLUE,STD,4/PK,20PK/CS: Brand: MEDLINE

## (undated) DEVICE — NO-SCRATCH ™ SMALL WHITNEY CURETTE ™ IS A SINGLE-USE, PLASTIC CURETTE FOR QUICKLY APPLYING, MANIPULATING AND REMOVING BONE CEMENT DURING HIP AND KNEE REPLACEMENT SURGERY. THE PLASTIC IS SOFTER THAN STEEL INSTRUMENTS, REDUCING THE RISK OF DAMAGING THE PROSTHESIS WITH METAL INSTRUMENTS.  THE CURETTE’S 6MM TIP REMOVES EXCESS CEMENT FROM REPLACEMENT HIPS AND KNEES. EASY-TO-MANEUVER, THE SMALL BLUE CURETTE LETS YOU REMOVE CEMENT FROM ALL EDGES OF THE PROSTHESIS.NO-SCRATCH WHITNEY SMALL CURETTE FEATURES:SAFER THAN STEEL- MADE OF PLASTIC - STURDY YET SOFTER THAN SURGICAL STEEL.HANDIER- EACH TOOL HAS A MOLDED-IN THUMB INDENTATION INSTANTLY ORIENTING THE TOOL.- EASIER TO MANEUVER IN HARD TO SEE PLACES.- COLOR-CODED FOR EASY IDENTIFICATION.FASTER- COMES INDIVIDUALLY PACKAGED IN STERILE, PEEL OPEN POUCH, READY TO GO.- APPLIES, MANIPULATES, OR REMOVES CEMENT WITH FINGERTIP PRECISION.ECONOMICAL- THE COST OF A SINGLE REVISION DWARFS THE COST OF A SINGLE-USE CURETTE. - DISPOSABLE – THERE’S NO NEED TO WASTE TIME REMOVING HARDENED CEMENT OR RE-STERILIZING TOOLS.- LESS EXPENSIVE TO BUY AND INVENTORY - ORDER ONLY THE TOOL YOU USE.- PACKAGED 25 INDIVIDUALLY WRAPPED TOOLS TO A CARTON FOR CONVENIENT SHELF STORAGE.: Brand: WHITNEY NO-SCRATCH CURETTE (SMALL)

## (undated) DEVICE — ENCORE® LATEX ORTHO SIZE 8, STERILE LATEX POWDER-FREE SURGICAL GLOVE: Brand: ENCORE

## (undated) DEVICE — 450 ML BOTTLE OF 0.05% CHLORHEXIDINE GLUCONATE IN 99.95% STERILE WATER FOR IRRIGATION, USP AND APPLICATOR.: Brand: IRRISEPT ANTIMICROBIAL WOUND LAVAGE

## (undated) DEVICE — CONN JET HYDRA H20 AUXILIARY DISP

## (undated) DEVICE — PEEL-AWAY TOGA, 2X LARGE: Brand: FLYTE

## (undated) DEVICE — PROXIMATE RH ROTATING HEAD SKIN STAPLERS (35 WIDE) CONTAINS 35 STAINLESS STEEL STAPLES: Brand: PROXIMATE

## (undated) DEVICE — SOL IRRG H2O PL/BG 1000ML STRL

## (undated) DEVICE — Device: Brand: PULSAVAC®

## (undated) DEVICE — STRYKER PERFORMANCE SERIES SAGITTAL BLADE: Brand: STRYKER PERFORMANCE SERIES

## (undated) DEVICE — GLV SURG ULTRAFREE MAX LTX PF 8

## (undated) DEVICE — LINER SURG CANSTR SXN S/RIGD 1500CC

## (undated) DEVICE — GLV SURG SENSICARE PI PF LF 7 GRN STRL

## (undated) DEVICE — DRP ROBOTIC ROSA BX/20

## (undated) DEVICE — DRSNG PAD ABD 8X10IN STRL

## (undated) DEVICE — GAUZE,SPONGE,4"X4",16PLY,STRL,LF,10/TRAY: Brand: MEDLINE

## (undated) DEVICE — SOL IRR NACL 0.9PCT 3000ML

## (undated) DEVICE — ELECTRD BLD EDGE COAT 3IN

## (undated) DEVICE — DRP SURG U/DRP INVISISHIELD PA 48X52IN

## (undated) DEVICE — APPL CHLORAPREP HI/LITE 26ML ORNG

## (undated) DEVICE — TOTAL KNEE-LF: Brand: MEDLINE INDUSTRIES, INC.

## (undated) DEVICE — 3 BONE CEMENT MIXER: Brand: MIXEVAC